# Patient Record
Sex: MALE | Race: WHITE | HISPANIC OR LATINO | URBAN - METROPOLITAN AREA
[De-identification: names, ages, dates, MRNs, and addresses within clinical notes are randomized per-mention and may not be internally consistent; named-entity substitution may affect disease eponyms.]

---

## 2017-05-17 ENCOUNTER — EMERGENCY (EMERGENCY)
Facility: HOSPITAL | Age: 52
LOS: 1 days | End: 2017-05-17

## 2021-09-19 ENCOUNTER — EMERGENCY (EMERGENCY)
Facility: HOSPITAL | Age: 56
LOS: 1 days | End: 2021-09-19
Admitting: EMERGENCY MEDICINE
Payer: COMMERCIAL

## 2021-09-19 PROCEDURE — 12001 RPR S/N/AX/GEN/TRNK 2.5CM/<: CPT

## 2021-09-19 PROCEDURE — 73140 X-RAY EXAM OF FINGER(S): CPT | Mod: 26,F3,LT

## 2021-09-19 PROCEDURE — 99283 EMERGENCY DEPT VISIT LOW MDM: CPT | Mod: 25

## 2022-12-06 ENCOUNTER — APPOINTMENT (OUTPATIENT)
Dept: PULMONOLOGY | Facility: CLINIC | Age: 57
End: 2022-12-06

## 2022-12-06 VITALS
SYSTOLIC BLOOD PRESSURE: 114 MMHG | HEIGHT: 68 IN | TEMPERATURE: 97.2 F | OXYGEN SATURATION: 99 % | HEART RATE: 90 BPM | BODY MASS INDEX: 23.04 KG/M2 | DIASTOLIC BLOOD PRESSURE: 71 MMHG | WEIGHT: 152 LBS

## 2022-12-06 DIAGNOSIS — R91.8 OTHER NONSPECIFIC ABNORMAL FINDING OF LUNG FIELD: ICD-10-CM

## 2022-12-06 PROBLEM — Z00.00 ENCOUNTER FOR PREVENTIVE HEALTH EXAMINATION: Status: ACTIVE | Noted: 2022-12-06

## 2022-12-06 PROCEDURE — 99204 OFFICE O/P NEW MOD 45 MIN: CPT

## 2022-12-06 NOTE — HISTORY OF PRESENT ILLNESS
[TextBox_4] : Very nice 58 yo man with persistent cough postdating Covid infection this summer\par Developed hemoptysis last week\par Notes persistent cough and now weight loss of at least 12 pounds\par CXR in September read as normal but Right hilum appeared full\par Sent for CT at [facility] last week:\par Large Right hilar mass with multiple nodules in right lung c/w metastases\par CT Calcium score in   showed NORMAL lung\par \par Nonsmoker\par No previous pulmonary disease\par Vaxxed and boosted--Covid in July for first time\par Handiman, , possible asbestos exposure in past\par \par Very strong FH for cancer, both parents  from cancer , mother ? lung\par Father ? primary\par Older sister  several years ago from cancer\par \par Hx richar, various broken bones No chronic meds. no med allergies\par

## 2022-12-06 NOTE — ASSESSMENT
[FreeTextEntry1] : Very nice 58 yo man with persistent cough postdating Covid infection this summer\par Developed hemoptysis last week\par Notes persistent cough and now weight loss of at least 12 pounds\par CXR in September read as normal but Right hilum appeared full\par Sent for CT at [facility] last week:\par Large Right hilar mass with multiple nodules in right lung c/w metastases\par CT Calcium score in   showed NORMAL lung\par \par Nonsmoker\par No previous pulmonary disease\par Vaxxed and boosted--Covid in July for first time\par Handiman, , possible asbestos exposure in past\par \par Very strong FH for cancer, both parents  from cancer , mother ? lung\par Father ? primary\par Older sister  several years ago from cancer\par \par Hx richar, various broken bones No chronic meds. no med allergies\par \par Imp\par 58 yo gentleman comes with wife and daughter (who is radiology tech)\par Post Covid in July developed cough\par Now has large right hilar mass with probable mets\par +++FH for cancer in parents and sister\par Patient says that he has an appt at [facility] in [facility] tomorrow with oncologist and surgeon\par They should proceed with this for diagnosis and treatment ASAP\par Will be glad to assist with post treatment care as required

## 2022-12-06 NOTE — CONSULT LETTER
[Dear  ___] : Dear  [unfilled], [FreeTextEntry1] : I had the pleasure of evaluating your patient, REJI APONTE , in the office today.  Please review my consultation and evaluation report that follows below.  Please do not hesitate to call me if further information is necessary or if you wish to discuss ongoing care or diagnostic work-up.   \par I very much appreciate your referral and it is a privilege to be able to provide care for your patient.\par \par Sincerely,\par  \par Ildefonso Butler MD, MHCM, FACP, ERIN-C\par Pulmonary Medicine\par  of Medicine\par [facility] at [facility]\par [email or web address] \par [facility] in [place]\par [address]\[place]  [address]\par    Fax \par \par [facility] [place]\par [address]\par [place], [place] \par \par

## 2024-10-22 ENCOUNTER — RESULT REVIEW (OUTPATIENT)
Age: 59
End: 2024-10-22

## 2024-10-22 ENCOUNTER — INPATIENT (INPATIENT)
Facility: HOSPITAL | Age: 59
LOS: 7 days | Discharge: NURSING, REHAB OR LONG-TERM CARE | DRG: 20 | End: 2024-10-30
Attending: NEUROLOGICAL SURGERY | Admitting: STUDENT IN AN ORGANIZED HEALTH CARE EDUCATION/TRAINING PROGRAM
Payer: COMMERCIAL

## 2024-10-22 ENCOUNTER — TRANSCRIPTION ENCOUNTER (OUTPATIENT)
Age: 59
End: 2024-10-22

## 2024-10-22 VITALS
TEMPERATURE: 98 F | SYSTOLIC BLOOD PRESSURE: 115 MMHG | RESPIRATION RATE: 14 BRPM | OXYGEN SATURATION: 99 % | DIASTOLIC BLOOD PRESSURE: 73 MMHG | HEART RATE: 70 BPM

## 2024-10-22 DIAGNOSIS — I61.9 NONTRAUMATIC INTRACEREBRAL HEMORRHAGE, UNSPECIFIED: ICD-10-CM

## 2024-10-22 LAB
ALBUMIN SERPL ELPH-MCNC: 3.9 G/DL — SIGNIFICANT CHANGE UP (ref 3.3–5.2)
ALP SERPL-CCNC: 120 U/L — SIGNIFICANT CHANGE UP (ref 40–120)
ALT FLD-CCNC: 9 U/L — SIGNIFICANT CHANGE UP
ANION GAP SERPL CALC-SCNC: 13 MMOL/L — SIGNIFICANT CHANGE UP (ref 5–17)
APTT BLD: 29.1 SEC — SIGNIFICANT CHANGE UP (ref 24.5–35.6)
AST SERPL-CCNC: 18 U/L — SIGNIFICANT CHANGE UP
BASOPHILS # BLD AUTO: 0.04 K/UL — SIGNIFICANT CHANGE UP (ref 0–0.2)
BASOPHILS NFR BLD AUTO: 0.3 % — SIGNIFICANT CHANGE UP (ref 0–2)
BILIRUB SERPL-MCNC: 0.7 MG/DL — SIGNIFICANT CHANGE UP (ref 0.4–2)
BLD GP AB SCN SERPL QL: SIGNIFICANT CHANGE UP
BUN SERPL-MCNC: 9.5 MG/DL — SIGNIFICANT CHANGE UP (ref 8–20)
CALCIUM SERPL-MCNC: 9 MG/DL — SIGNIFICANT CHANGE UP (ref 8.4–10.5)
CHLORIDE SERPL-SCNC: 102 MMOL/L — SIGNIFICANT CHANGE UP (ref 96–108)
CO2 SERPL-SCNC: 24 MMOL/L — SIGNIFICANT CHANGE UP (ref 22–29)
CREAT SERPL-MCNC: 0.81 MG/DL — SIGNIFICANT CHANGE UP (ref 0.5–1.3)
EGFR: 101 ML/MIN/1.73M2 — SIGNIFICANT CHANGE UP
EOSINOPHIL # BLD AUTO: 0.04 K/UL — SIGNIFICANT CHANGE UP (ref 0–0.5)
EOSINOPHIL NFR BLD AUTO: 0.3 % — SIGNIFICANT CHANGE UP (ref 0–6)
GLUCOSE BLDC GLUCOMTR-MCNC: 102 MG/DL — HIGH (ref 70–99)
GLUCOSE BLDC GLUCOMTR-MCNC: 82 MG/DL — SIGNIFICANT CHANGE UP (ref 70–99)
GLUCOSE BLDC GLUCOMTR-MCNC: 93 MG/DL — SIGNIFICANT CHANGE UP (ref 70–99)
GLUCOSE SERPL-MCNC: 108 MG/DL — HIGH (ref 70–99)
HCT VFR BLD CALC: 40.3 % — SIGNIFICANT CHANGE UP (ref 39–50)
HGB BLD-MCNC: 13.5 G/DL — SIGNIFICANT CHANGE UP (ref 13–17)
IMM GRANULOCYTES NFR BLD AUTO: 0.5 % — SIGNIFICANT CHANGE UP (ref 0–0.9)
INR BLD: 1.13 RATIO — SIGNIFICANT CHANGE UP (ref 0.85–1.16)
LYMPHOCYTES # BLD AUTO: 0.85 K/UL — LOW (ref 1–3.3)
LYMPHOCYTES # BLD AUTO: 6.3 % — LOW (ref 13–44)
MCHC RBC-ENTMCNC: 28.8 PG — SIGNIFICANT CHANGE UP (ref 27–34)
MCHC RBC-ENTMCNC: 33.5 GM/DL — SIGNIFICANT CHANGE UP (ref 32–36)
MCV RBC AUTO: 85.9 FL — SIGNIFICANT CHANGE UP (ref 80–100)
MONOCYTES # BLD AUTO: 0.9 K/UL — SIGNIFICANT CHANGE UP (ref 0–0.9)
MONOCYTES NFR BLD AUTO: 6.6 % — SIGNIFICANT CHANGE UP (ref 2–14)
MRSA PCR RESULT.: SIGNIFICANT CHANGE UP
NEUTROPHILS # BLD AUTO: 11.66 K/UL — HIGH (ref 1.8–7.4)
NEUTROPHILS NFR BLD AUTO: 86 % — HIGH (ref 43–77)
PLATELET # BLD AUTO: 319 K/UL — SIGNIFICANT CHANGE UP (ref 150–400)
POTASSIUM SERPL-MCNC: 3.8 MMOL/L — SIGNIFICANT CHANGE UP (ref 3.5–5.3)
POTASSIUM SERPL-SCNC: 3.8 MMOL/L — SIGNIFICANT CHANGE UP (ref 3.5–5.3)
PROT SERPL-MCNC: 7.5 G/DL — SIGNIFICANT CHANGE UP (ref 6.6–8.7)
PROTHROM AB SERPL-ACNC: 12.8 SEC — SIGNIFICANT CHANGE UP (ref 9.9–13.4)
RBC # BLD: 4.69 M/UL — SIGNIFICANT CHANGE UP (ref 4.2–5.8)
RBC # FLD: 14.7 % — HIGH (ref 10.3–14.5)
S AUREUS DNA NOSE QL NAA+PROBE: SIGNIFICANT CHANGE UP
SODIUM SERPL-SCNC: 139 MMOL/L — SIGNIFICANT CHANGE UP (ref 135–145)
WBC # BLD: 13.56 K/UL — HIGH (ref 3.8–10.5)
WBC # FLD AUTO: 13.56 K/UL — HIGH (ref 3.8–10.5)

## 2024-10-22 PROCEDURE — 93970 EXTREMITY STUDY: CPT | Mod: 26

## 2024-10-22 PROCEDURE — 88360 TUMOR IMMUNOHISTOCHEM/MANUAL: CPT | Mod: 26,1L,59

## 2024-10-22 PROCEDURE — 95720 EEG PHY/QHP EA INCR W/VEEG: CPT

## 2024-10-22 PROCEDURE — 93306 TTE W/DOPPLER COMPLETE: CPT | Mod: 26

## 2024-10-22 PROCEDURE — 71260 CT THORAX DX C+: CPT | Mod: 26

## 2024-10-22 PROCEDURE — 93010 ELECTROCARDIOGRAM REPORT: CPT

## 2024-10-22 PROCEDURE — 99285 EMERGENCY DEPT VISIT HI MDM: CPT

## 2024-10-22 PROCEDURE — 99223 1ST HOSP IP/OBS HIGH 75: CPT

## 2024-10-22 PROCEDURE — 88307 TISSUE EXAM BY PATHOLOGIST: CPT | Mod: 26

## 2024-10-22 PROCEDURE — 88342 IMHCHEM/IMCYTCHM 1ST ANTB: CPT | Mod: 26

## 2024-10-22 PROCEDURE — 70553 MRI BRAIN STEM W/O & W/DYE: CPT | Mod: 26

## 2024-10-22 PROCEDURE — 74177 CT ABD & PELVIS W/CONTRAST: CPT | Mod: 26

## 2024-10-22 PROCEDURE — 88341 IMHCHEM/IMCYTCHM EA ADD ANTB: CPT | Mod: 26

## 2024-10-22 PROCEDURE — 70545 MR ANGIOGRAPHY HEAD W/DYE: CPT | Mod: 26,59

## 2024-10-22 PROCEDURE — 99291 CRITICAL CARE FIRST HOUR: CPT

## 2024-10-22 PROCEDURE — 70450 CT HEAD/BRAIN W/O DYE: CPT | Mod: 26

## 2024-10-22 RX ORDER — ACETAMINOPHEN 500 MG
1000 TABLET ORAL ONCE
Refills: 0 | Status: COMPLETED | OUTPATIENT
Start: 2024-10-22 | End: 2024-10-22

## 2024-10-22 RX ORDER — NICARDIPINE HCL 30 MG
5 CAPSULE, EXTENDED RELEASE ORAL
Qty: 40 | Refills: 0 | Status: DISCONTINUED | OUTPATIENT
Start: 2024-10-22 | End: 2024-10-22

## 2024-10-22 RX ORDER — SODIUM CHLORIDE 9 MG/ML
1000 INJECTION, SOLUTION INTRAMUSCULAR; INTRAVENOUS; SUBCUTANEOUS
Refills: 0 | Status: DISCONTINUED | OUTPATIENT
Start: 2024-10-22 | End: 2024-10-22

## 2024-10-22 RX ORDER — POTASSIUM PHOSPHATE 236; 224 MG/ML; MG/ML
15 INJECTION, SOLUTION INTRAVENOUS ONCE
Refills: 0 | Status: COMPLETED | OUTPATIENT
Start: 2024-10-22 | End: 2024-10-22

## 2024-10-22 RX ORDER — LEVETIRACETAM 500 MG/1
500 TABLET, FILM COATED ORAL EVERY 12 HOURS
Refills: 0 | Status: DISCONTINUED | OUTPATIENT
Start: 2024-10-22 | End: 2024-10-22

## 2024-10-22 RX ORDER — ROSUVASTATIN CALCIUM 10 MG
1 TABLET ORAL
Refills: 0 | DISCHARGE

## 2024-10-22 RX ORDER — CHLORHEXIDINE GLUCONATE 40 MG/ML
1 SOLUTION TOPICAL
Refills: 0 | Status: DISCONTINUED | OUTPATIENT
Start: 2024-10-22 | End: 2024-10-30

## 2024-10-22 RX ORDER — LEVETIRACETAM 500 MG/1
750 TABLET, FILM COATED ORAL EVERY 12 HOURS
Refills: 0 | Status: DISCONTINUED | OUTPATIENT
Start: 2024-10-22 | End: 2024-10-23

## 2024-10-22 RX ORDER — INSULIN LISPRO 100/ML
VIAL (ML) SUBCUTANEOUS EVERY 6 HOURS
Refills: 0 | Status: DISCONTINUED | OUTPATIENT
Start: 2024-10-22 | End: 2024-10-23

## 2024-10-22 RX ORDER — HYDRALAZINE HYDROCHLORIDE 50 MG/1
10 TABLET, FILM COATED ORAL
Refills: 0 | Status: DISCONTINUED | OUTPATIENT
Start: 2024-10-22 | End: 2024-10-25

## 2024-10-22 RX ORDER — LEVETIRACETAM 500 MG/1
1000 TABLET, FILM COATED ORAL ONCE
Refills: 0 | Status: DISCONTINUED | OUTPATIENT
Start: 2024-10-22 | End: 2024-10-22

## 2024-10-22 RX ORDER — LABETALOL HCL 200 MG
10 TABLET ORAL
Refills: 0 | Status: DISCONTINUED | OUTPATIENT
Start: 2024-10-22 | End: 2024-10-25

## 2024-10-22 RX ADMIN — Medication 25 MG/HR: at 10:32

## 2024-10-22 RX ADMIN — SODIUM CHLORIDE 75 MILLILITER(S): 9 INJECTION, SOLUTION INTRAMUSCULAR; INTRAVENOUS; SUBCUTANEOUS at 12:03

## 2024-10-22 RX ADMIN — SODIUM CHLORIDE 75 MILLILITER(S): 9 INJECTION, SOLUTION INTRAMUSCULAR; INTRAVENOUS; SUBCUTANEOUS at 10:33

## 2024-10-22 RX ADMIN — CHLORHEXIDINE GLUCONATE 1 APPLICATION(S): 40 SOLUTION TOPICAL at 12:03

## 2024-10-22 RX ADMIN — POTASSIUM PHOSPHATE 62.5 MILLIMOLE(S): 236; 224 INJECTION, SOLUTION INTRAVENOUS at 11:08

## 2024-10-22 RX ADMIN — LEVETIRACETAM 750 MILLIGRAM(S): 500 TABLET, FILM COATED ORAL at 17:41

## 2024-10-22 RX ADMIN — Medication 1000 MILLIGRAM(S): at 19:53

## 2024-10-22 RX ADMIN — LEVETIRACETAM 1000 MILLIGRAM(S): 500 TABLET, FILM COATED ORAL at 15:07

## 2024-10-22 RX ADMIN — Medication 400 MILLIGRAM(S): at 18:53

## 2024-10-22 NOTE — ED ADULT NURSE NOTE - OBJECTIVE STATEMENT
[place] [facility] [place]. Last night the pt was c/o headache, but was at baseline. This AM woke up and was unable to speak. Arrives to ED on Nicardipine gtt with a BP in the 120s. As per wife at the bedside pt has PMHx lung ca, not currently on chemo and does not require baseline oxygen. Pt is able to follow commands and shake his head yes/no to simple questions, but is unable to speak. Right sided facial droop present at this time with right sided upper extremity weakness.

## 2024-10-22 NOTE — ED PROVIDER NOTE - ATTENDING CONTRIBUTION TO CARE
Dr. Richard: I personally saw the patient with the resident and performed a substantive portion of the visit. I performed a face to face bedside interview with patient regarding history of present illness, review of symptoms and past medical history. I completed an independent physical exam and all aspects of medical decision making. I have discussed patient's plan of care with resident. I agree with note as stated above, having amended the EMR as needed to reflect my findings. This includes HISTORY OF PRESENT ILLNESS, HIV, PAST MEDICAL/SURGICAL/FAMILY/SOCIAL HISTORY, ALLERGIES AND HOME MEDICATIONS, ROS, PHYSICAL EXAM, MEDICAL DECISION MAKING and any PROGRESS NOTES during the time I functioned as the attending physician for this patient.

## 2024-10-22 NOTE — CONSULT NOTE ADULT - SUBJECTIVE AND OBJECTIVE BOX
This is a 59 year old gentleman with a pertinent history of lung cancer s/p right lobectomy (MSK 2022) who presented with aphasia and headache started suddenly at 1630 on 10/21 and followed by right facial droop and right-sided weakness this morning. Patient took ASA 81 mg yesterday at 1630 when aphasia started. NIHSS of 7 on admission. CTH demonstrated left posterior frontal lobar ICH. CTA h/n is unremarkable for intracranial vessel pathology.     Neurological Exam:   MS - Alert, awake, follows simple but not crossed or complex commands. Severe expressive aphasia. Able to write his name. No neglect.   CN - PERRL, EOMI, VFF, right facial droop   Motor - Mild drift RUE, confrontational testing intact though; the rest - full strength   Sensory - intact to LT

## 2024-10-22 NOTE — ED ADULT NURSE NOTE - NSFALLHARMRISKINTERV_ED_ALL_ED

## 2024-10-22 NOTE — ED ADULT NURSE NOTE - BEFAST SCREENING
Airway  Performed by: Alexander Aleman CRNA  Authorized by: Alexander Aleman CRNA     Final Airway Type:  Endotracheal airway  Final Endotracheal Airway*:  ETT  ETT Size (mm)*:  8.5  Cuff*:  Regular  Technique Used for Successful ETT Placement:  Direct laryngoscopy  Devices/Methods Used in Placement*:  Mask and Oral ETT  Intubation Procedure*:  Preoxygenation, ETCO2, Atraumatic, Dentition Unchanged, Pharynx Clear and Rapid Sequence Intubation  Insertion Site:  Oral  Blade Type*:  MAC  Blade Size*:  3  Cuff Volume (mL):  8  Secured at (cm)*:  23  Placement Verified by: auscultation, bronchoscopy, capnometry and equal breath sounds    Glottic View*:  1 - full view of glottis  Attempts*:  1  Number of Other Approaches Attempted:  0   Patient Identified, Procedure confirmed, Emergency equipment available and Safety protocols followed  Location:  OR  Urgency:  Elective  Difficult Airway: No    Indications for Airway Management:  Anesthesia  Spontaneous Ventilation: absent    Sedation Level:  Anesthetized  Manual In-line Stabilization Maintained Throughout: Yes    Mask Difficulty Assessment:  1 - vent by mask  Performed By:  CRNA  CRNA:  Alexander Aleman CRNA  Start Time: 3/21/2023 9:02 AM     Positive

## 2024-10-22 NOTE — H&P ADULT - ASSESSMENT
58 yo M with PMHx of lung cancer (2021 ) [place]/p lobectomy [place] [facility] [place] and aphasia followed by R facial and R sided weakness starting 4:30 PM. Found to have L posterior frontal cortical ICH, admitted to NeuroICU for further care    PLAN:  Neuro:  - Q1 hour Neuro checks, Q1 hour Vitals  - HOB 30 degrees, Neck midline position  - Maintain normothermia, PO acetaminophen for temp>38 C or pain  - repeat CTH in 4-6hrs   - CTA head/neck Negative.   - Pending MR brain w/ and w/o to rule out mets.   - Pending MRV w/ contrast to evlaute sinus filling.   - PT/OT   - AED: Keppra 500mg BID   	  CV:  - SBP Goal<150, wean cardene as amenable.   - CT chest abdomen and pelvis for mets workup  - PRN: hydralazine    Pulm:  - Supplemental O2 PRN to maintain Spo2>92%    GI:  - NPO  - start bowel regimen when able     	  :  - NS@75  - Monitor Electrolytes & Renal Function    Heme:  - Monitor H&H  - DVT ppx: SCDs  - pend b/l LE duplex   	  ID:  - Monitor WBC and Temperature      Endo  - Monitor BGL, maintain <180  - ISS

## 2024-10-22 NOTE — CHART NOTE - NSCHARTNOTEFT_GEN_A_CORE
EEG preliminary read (not final) on the initial recording hour(s) = x 1     No seizures recorded.  Normal awake EEG, PDR 9-9.5.   Final report to follow tomorrow morning after completion of study.    Marshall Joe DO  Epilepsy Fellow, PGY-5    -------------------------------------------------------------------------------------------------------  [facility] EEG Reading Room Ph#: [phone]  Epilepsy Answering Service after 5PM and before 8:30AM: Ph#: [phone]

## 2024-10-22 NOTE — PATIENT PROFILE ADULT - FALL HARM RISK - HARM RISK INTERVENTIONS
Assistance with ambulation/Assistance OOB with selected safe patient handling equipment/Communicate Risk of Fall with Harm to all staff/Monitor gait and stability/Reinforce activity limits and safety measures with patient and family/Sit up slowly, dangle for a short time, stand at bedside before walking/Tailored Fall Risk Interventions/Visual Cue: Yellow wristband and red socks/Bed in lowest position, wheels locked, appropriate side rails in place/Call bell, personal items and telephone in reach/Instruct patient to call for assistance before getting out of bed or chair/Non-slip footwear when patient is out of bed/[place] to call system/Physically safe environment - no spills, clutter or unnecessary equipment/Purposeful Proactive Rounding/Room/bathroom lighting operational, light cord in reach

## 2024-10-22 NOTE — CONSULT NOTE ADULT - SUBJECTIVE AND OBJECTIVE BOX
HPI:  60 yo M with PMHX of lung cancer(diagnosed ) s/p R lobectomy (MSK ) and HLD  p/w migrane and aphasia starting 4:30 pm 10/21/2024  followed by new onset R facial droop& R sided weakness starting this AM 10/22/2024. Wife states she gave one tablet of baby ASA around 4:30 pm yesterdat for migrane. Patient denies daily use of AC/AP. Denies trauma. NIHSS 7 upon admission. Patient found to have L posterior frontal cortical ICH, transfered to [facility]  for further care   NIH Stroke Scale:  - Intubated No  - Sedated/Unresponsive No  - NIH Stroke Scale: LOC (0) Alert; keenly responsive  - NIH Stroke Scale: LOC Question (0) Answers both questions correctly  - NIH Stroke Scale: LOC Command (0) Performs both tasks correctly  - NIH Stroke Scale: Gaze (0) Normal  - NIH Stroke Scale: Visual (0) No visual loss  - NIH Stroke Scale: Facial (3) Complete paralysis of one or both sides (absence  of facial movement in the upper and lower face)  - NIH Stroke Scale: Arm Left (0) No drift; limb holds 90 (or 45) degrees for  full 10 secs  - NIH Stroke Scale: Arm Right (1) Drift; limb holds 90 (or 45) degrees, but  drifts down before full 10 secs; does not hit bed or other support  - NIH Stroke Scale: Leg Left (0) No drift; leg holds 30 degree position for  full 5 secs  - NIH Stroke Scale: Leg Right (0) No drift; leg holds 30 degree position for  full 5 secs  - NIH Stroke Scale: Ataxia (0) Absent  - NIH Stroke Scale: Sensory (0) Normal; no sensory loss  - NIH Stroke Scale: Language (3) Mute, global aphasia; no usable speech or  auditory comprehension  - NIH Stroke Scale: Dysarthria (0) Normal  - NIH Stroke Scale: Extinct Inattention (0) No abnormality  - NIH Stroke Scale: Total 7    PMHx:   Lung cancer diagnosed in , s/p R Lobectomy- follows with MSK  HLD: admits noncompliant with medication    Allergies:   NKDA      Vital Signs Last 24 Hrs  T(C): 37.4 (22 Oct 2024 10:34), Max: 37.4 (22 Oct 2024 10:34)  T(F): 99.4 (22 Oct 2024 10:34), Max: 99.4 (22 Oct 2024 10:34)  HR: 95 (22 Oct 2024 10:49) (70 - 104)  BP: 114/42 (22 Oct 2024 10:49) (114/42 - 127/89)  BP(mean): 64 (22 Oct 2024 10:49) (64 - 101)  RR: 16 (22 Oct 2024 10:49) (14 - 20)  SpO2: 99% (22 Oct 2024 10:49) (97% - 99%)    Parameters below as of 22 Oct 2024 10:49  Patient On (Oxygen Delivery Method): room air              LABS:                        13.5   13.56 )-----------( 319      ( 22 Oct 2024 09:30 )             40.3     10-    139  |  102  |  9.5  ----------------------------<  108[H]  3.8   |  24.0  |  0.81    Ca    9.0      22 Oct 2024 09:30  Phos  2.4     10-  Mg     2.0     10-    TPro  7.5  /  Alb  3.9  /  TBili  0.7  /  DBili  x   /  AST  18  /  ALT  9   /  AlkPhos  120  10-22    PT/INR - ( 22 Oct 2024 09:30 )   PT: 12.8 sec;   INR: 1.13 ratio         PTT - ( 22 Oct 2024 09:30 )  PTT:29.1 sec  Urinalysis Basic - ( 22 Oct 2024 09:30 )    Color: x / Appearance: x / SG: x / pH: x  Gluc: 108 mg/dL / Ketone: x  / Bili: x / Urobili: x   Blood: x / Protein: x / Nitrite: x   Leuk Esterase: x / RBC: x / WBC x   Sq Epi: x / Non Sq Epi: x / Bacteria: x      Physical Exam:  General: No Acute Distress   Neurological: Awake, alert & oriented to person, place and time (with choices) Following Commands, Severe expressive aphasia. Understands and follows commands appropriately. PERRL, EOMI, Visual fields intact,, + R facial asymmetry.   Muscle Strength: RUE: 4+/5 + Slight R drift  H/5  LUE: 5/5 RLE: 4+/5 LLE: 5/5  No Drift   Sensation Intact to Light Touch   Cerebellar: There is no dysmetria on finger to nose testing.  Gait : deferred  Pulmonary: normal chest rise and expansion   Cardiovascular:  +S1, +S2  Gastrointestinal: Soft  Nondistended    RADIOLOGY & ADDITIONAL TESTS:  CT Head 10/22 @ [facility] @ 8:07   IMPRESSION:  1. Redemonstrated left frontal lobe parenchymal hemorrhage, grossly stable.  2. Faint subarachnoid hemorrhage overlying the left frontal lobe sulci and sylvian fissure.

## 2024-10-22 NOTE — ED ADULT TRIAGE NOTE - CHIEF COMPLAINT QUOTE
pt transferred from [facility] for eval of brain bleed. pt aphasic with facial drop. LKW 1800 10/21. pt on cardene gtt @ 5

## 2024-10-22 NOTE — H&P ADULT - NSHPPHYSICALEXAM_GEN_ALL_CORE
PHYSICAL EXAM:  General: No Acute Distress   Neurological: Awake, alert & oriented to person, place and time (with choices) Following Commands, Severe expressive aphasia. Understands and follows commands appropriately. PERRL, EOMI, Visual fields intact,, + R facial asymmetry.     Muscle Strength: RUE: 4+/5 + Slight R drift  H/5  LUE: 5/5 RLE: 4+/5 LLE: 5/5  No Drift     Sensation Intact to Light Touch     Cerebellar: There is no dysmetria on finger to nose testing.    Gait : deferred    Pulmonary: normal chest rise and expansion     Cardiovascular:  +S1, +S2    Gastrointestinal: Soft  Nondistended

## 2024-10-22 NOTE — ED PROVIDER NOTE - OBJECTIVE STATEMENT
58Yo M hx of lung CA sp R lobectomy and R arter Brandi Richard MD, Attending  58 yo M with PMHX of lung cancer(diagnosed 2021) s/p R lobectomy (MSK 2022) and HLD  p/w migrane at 4:30 pm 10/21/2024  followed by new onset R facial droop& R sided weakness and aphasia this AM 10/22/2024. Wife states she gave one tablet of baby ASA around 4:30 pm yesterday for migrane, no AC. Denies trauma. Patient found to have L posterior frontal cortical ICH from [place] and transferred to [facility] for nsgy eval.     No change in neuro status since  per EMS, Nicardipine infusion started just prior to EMS  at [place],  on 5mg/hr at ED arrival. NIHSS 7 at ED eval.

## 2024-10-22 NOTE — DISCHARGE NOTE NURSING/CASE MANAGEMENT/SOCIAL WORK - PATIENT PORTAL LINK FT
You can access the FollowMyHealth Patient Portal offered by [facility] by registering at the following website: [email or web address]. By joining [employer]’s FollowMyHealth portal, you will also be able to view your health information using other applications (apps) compatible with our system.

## 2024-10-22 NOTE — ED PROVIDER NOTE - PHYSICAL EXAMINATION
Brandi Richard MD Attending  GEN: Patient awake, aphasic but follows commands and is alert.   HEENT: normocephalic, atraumatic, EOMI, no scleral icterus  CARDIAC: sbp 127 on cardene infusion @5mg/hr  PULM: no distress, no wheeze.   ABD: soft NT, ND, no rebound no guarding  MSK: Moving all extremities, no edema. 4/5 strength RUE  NEURO: + aphasia, right facial weakness.   SKIN: warm, dry, no rash.

## 2024-10-22 NOTE — DISCHARGE NOTE NURSING/CASE MANAGEMENT/SOCIAL WORK - NSDCPEFALRISK_GEN_ALL_CORE
For information on Fall & Injury Prevention, visit: [email or web address] [email or web address] OR  [email or web address] OR  https://www.cdc.gov/steadi/patient.html

## 2024-10-22 NOTE — DISCHARGE NOTE NURSING/CASE MANAGEMENT/SOCIAL WORK - FINANCIAL ASSISTANCE
[facility] provides services at a reduced cost to those who are determined to be eligible through [facility]’s financial assistance program. Information regarding [facility]’s financial assistance program can be found by going to [email or web address] or by calling [phone].

## 2024-10-22 NOTE — H&P ADULT - NSHPSOCIALHISTORY_GEN_ALL_CORE
patient lives with wife in ranch home. Denies hx of smoking, or illiciit drug use. Notes occasional recreational etoh use.

## 2024-10-22 NOTE — ED PROVIDER NOTE - CLINICAL SUMMARY MEDICAL DECISION MAKING FREE TEXT BOX
Brandi Richard MD, Attending  ddx includes, but is not limited to the following:  plan:  update: see progress notes.   ----

## 2024-10-22 NOTE — H&P ADULT - HISTORY OF PRESENT ILLNESS
58 yo M with PMHX of lung cancer(diagnosed 2021) s/p R lobectomy (MSK 2022) and HLD  p/w migrane and aphasia starting 4:30 pm 10/21/2024  followed by new onset R facial droop  and R sided weakness starting this morning 10/22/2024. Wife states she gave one tablet of baby ASA around 4:30 pm yesterdat for migrane. Patient denies daily use of AC/AP. Denies trauma. NIHSS 7 upon admission. Patient found to have L posterior frontal cortical ICH, transfered to [facility] for further care. Patient on cardene in ED, transfered to NeuroICU for monitoring.     NIH Stroke Scale:  - Intubated No  - Sedated/Unresponsive No  - NIH Stroke Scale: LOC (0) Alert; keenly responsive  - NIH Stroke Scale: LOC Question (0) Answers both questions correctly  - NIH Stroke Scale: LOC Command (0) Performs both tasks correctly  - NIH Stroke Scale: Gaze (0) Normal  - NIH Stroke Scale: Visual (0) No visual loss  - NIH Stroke Scale: Facial (3) Complete paralysis of one or both sides (absence  of facial movement in the upper and lower face)  - NIH Stroke Scale: Arm Left (0) No drift; limb holds 90 (or 45) degrees for  full 10 secs  - NIH Stroke Scale: Arm Right (1) Drift; limb holds 90 (or 45) degrees, but  drifts down before full 10 secs; does not hit bed or other support  - NIH Stroke Scale: Leg Left (0) No drift; leg holds 30 degree position for  full 5 secs  - NIH Stroke Scale: Leg Right (0) No drift; leg holds 30 degree position for  full 5 secs  - NIH Stroke Scale: Ataxia (0) Absent  - NIH Stroke Scale: Sensory (0) Normal; no sensory loss  - NIH Stroke Scale: Language (3) Mute, global aphasia; no usable speech or  auditory comprehension  - NIH Stroke Scale: Dysarthria (0) Normal  - NIH Stroke Scale: Extinct Inattention (0) No abnormality  - NIH Stroke Scale: Total 7      PMHx:   Lung cancer diagnosed in 2021, s/p R Lobectomy- follows with MSK  HLD: admits noncompliant with medication    Allergies:   NKDA

## 2024-10-22 NOTE — PHARMACOTHERAPY INTERVENTION NOTE - COMMENTS
Medication reconciliation completed by speaking to patient's wife and daughter at bedside.  Patient currently does not take any medications.   Per wife, patient was given aspirin 325mg for headache yesterday around 4:30pm.     No known medication allergies.  Medication reconciliation completed by speaking to patient's wife and daughter at bedside.  Patient currently does not take any medications.   Per daughter, is supposed to be on rosuvastatin 10mg QD but refuses to take medication.     Of note per wife, patient was given aspirin 325mg for headache yesterday around 4:30pm.     No known medication allergies.

## 2024-10-22 NOTE — CONSULT NOTE ADULT - SUBJECTIVE AND OBJECTIVE BOX
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 LKW yesterday 10/21 at 4:30 when he began having a headache.  Wife gave him aspirin and she noted around 6:30PM he wasn't speaking.  This AM noted he had a facial droop so was sent to the hospital.  He was found to have a L frontal cortical IPH at [facility] [place] and then transferred here for further mgmt.          PAST MEDICAL & SURGICAL HISTORY:  Lung cancer diagnosed in 2021, s/p R Lobectomy- follows with [facility]--also with RT        MEDICATIONS  (STANDING):  acetaminophen   IVPB .. 1000 milliGRAM(s) IV Intermittent once  chlorhexidine 2% Cloths 1 Application(s) Topical <User Schedule>  dextrose 50% Injectable 25 Gram(s) IV Push once  dextrose 50% Injectable 12.5 Gram(s) IV Push once  dextrose 50% Injectable 25 Gram(s) IV Push once  insulin lispro (ADMELOG) corrective regimen sliding scale   SubCutaneous every 6 hours  levETIRAcetam   Injectable 500 milliGRAM(s) IV Push every 12 hours  niCARdipine Infusion 5 mG/Hr (25 mL/Hr) IV Continuous <Continuous>  sodium chloride 0.9%. 1000 milliLiter(s) (75 mL/Hr) IV Continuous <Continuous>    MEDICATIONS  (PRN):      Allergies    No Known Allergies    Intolerances        SOCIAL HISTORY:  no tob,   no alcohol   no drugs    FAMILY HISTORY:        ROS: 14 point ROS negative other than what is present in HPI or below    Vital Signs Last 24 Hrs  T(C): 37.4 (22 Oct 2024 11:25), Max: 37.4 (22 Oct 2024 10:34)  T(F): 99.4 (22 Oct 2024 11:25), Max: 99.4 (22 Oct 2024 10:34)  HR: 87 (22 Oct 2024 13:00) (70 - 104)  BP: 120/103 (22 Oct 2024 13:00) (114/42 - 138/103)  BP(mean): 106 (22 Oct 2024 13:00) (64 - 115)  RR: 17 (22 Oct 2024 13:00) (14 - 20)  SpO2: 98% (22 Oct 2024 13:00) (97% - 99%)    Parameters below as of 22 Oct 2024 12:00  Patient On (Oxygen Delivery Method): room air          Physical Exam:  General: No acute distress.   HEENT: Head normocephalic, atraumatic. Conjunctivae clear w/o exudates or hemorrhage. Sclera non-icteric. Nares are patent bilaterally. Mucous membranes pink and moist.  No tonsillar swelling or exudates.    Neck: Supple, no adenopathy. Trachea midline. No JVD.  Cardiac: Normal rate and rhythm. S1, S2 auscultated. No murmurs, gallops, or rubs.     Respiratory: Lung sounds clear in all fields. Chest wall symmetric, nontender.   Abdominal: Soft, nondistended, nontender. Bowel sounds normoactive x 4 quadrants. No masses, hepatomegaly, or splenomegaly.   Skin: Skin is warm, dry and intact without rashes or lesions. Appropriate color for ethnicity. Nailbeds pink with no cyanosis or clubbing.   Extremities: No edema, 2+ peripheral pulses in b/l upper and b/l lower extremities. Full range of motion in all joints.     Detailed Neurologic Exam:    Mental status: The patient is awake and alert and has normal attention span.  Nonverbal, comprehension intact    Cranial nerves: Pupils equal and react symmetrically to light. There is no visual field deficit to confrontation. Extraocular motion is full with no nystagmus. R facial droop with mild ptosis, forehead spared, Facial musculature is symmetric. Palate elevates symmetrically. Tongue is midline.    Motor: There is normal bulk and tone.  There is no tremor.  Strength is 5-/5 in RUE with mild distal finger flexion and orbiting of the RUE, RLE 5/5  Strength is 5/5 in the left arm and leg.    Sensation: Intact to light touch and pin in 4 extremities    Reflexes: 1-2+ throughout and plantar responses are flexor.    Cerebellar: There is no dysmetria on finger to nose testing.    Gait : deferred    NIH SS:  DATE:  TIME:  1A: Level of consciousness (0-3):   1B: Questions (0-2):   1C: Commands (0-2):   2: Gaze (0-2):   3: Visual fields (0-3):   4: Facial palsy (0-3):   MOTOR:  5A: Left arm motor drift (0-4):   5B: Right arm motor drift (0-4):   6A: Left leg motor drift (0-4):   6B: Right leg motor drift (0-4):   7: Limb ataxia (0-2):   SENSORY:  8: Sensation (0-2):   SPEECH:  9: Language (0-3): 2  10: Dysarthria (0-2):   EXTINCTION:  11: Extinction/inattention (0-2):     TOTAL SCORE: 2    prehospital mRS=0      LABS:                         13.5   13.56 )-----------( 319      ( 22 Oct 2024 09:30 )             40.3       10-22    139  |  102  |  9.5  ----------------------------<  108[H]  3.8   |  24.0  |  0.81    Ca    9.0      22 Oct 2024 09:30  Phos  2.4     10-22  Mg     2.0     10-22    TPro  7.5  /  Alb  3.9  /  TBili  0.7  /  DBili  x   /  AST  18  /  ALT  9   /  AlkPhos  120  10-22      PT/INR - ( 22 Oct 2024 09:30 )   PT: 12.8 sec;   INR: 1.13 ratio         PTT - ( 22 Oct 2024 09:30 )  PTT:29.1 sec        A1C:         RADIOLOGY & ADDITIONAL STUDIES (independently reviewed unless otherwise noted):  CT head: 1. Redemonstrated left frontal lobe parenchymal hemorrhage, grossly stable.  2. Faint subarachnoid hemorrhage overlying the left frontal lobe sulci and sylvian fissure.    CTA head and neck: Normal CTA of the head and neck. No evidence of left middle cerebral artery aneurysm. The left frontal parenchymal hemorrhage with vasogenic edema may represent a brain metastasis in view of the history. Recommend MRI for further evaluation.    Evidence of prior right-sided pulmonary lobectomy with midline shift to the right.

## 2024-10-22 NOTE — ED ADULT NURSE NOTE - CHIEF COMPLAINT QUOTE
[place] [facility] [place]. pt aphasic with facial drop. LKW 1800 10/21. pt on cardene gtt @ 5

## 2024-10-23 ENCOUNTER — RESULT REVIEW (OUTPATIENT)
Age: 59
End: 2024-10-23

## 2024-10-23 DIAGNOSIS — I50.20 UNSPECIFIED SYSTOLIC (CONGESTIVE) HEART FAILURE: ICD-10-CM

## 2024-10-23 DIAGNOSIS — I61.9 NONTRAUMATIC INTRACEREBRAL HEMORRHAGE, UNSPECIFIED: ICD-10-CM

## 2024-10-23 DIAGNOSIS — Z01.810 ENCOUNTER FOR PREPROCEDURAL CARDIOVASCULAR EXAMINATION: ICD-10-CM

## 2024-10-23 LAB
A1C WITH ESTIMATED AVERAGE GLUCOSE RESULT: 6 % — HIGH (ref 4–5.6)
ANION GAP SERPL CALC-SCNC: 16 MMOL/L — SIGNIFICANT CHANGE UP (ref 5–17)
APTT BLD: 30.1 SEC — SIGNIFICANT CHANGE UP (ref 24.5–35.6)
BUN SERPL-MCNC: 11.2 MG/DL — SIGNIFICANT CHANGE UP (ref 8–20)
CALCIUM SERPL-MCNC: 9 MG/DL — SIGNIFICANT CHANGE UP (ref 8.4–10.5)
CHLORIDE SERPL-SCNC: 101 MMOL/L — SIGNIFICANT CHANGE UP (ref 96–108)
CHOLEST SERPL-MCNC: 258 MG/DL — HIGH
CO2 SERPL-SCNC: 22 MMOL/L — SIGNIFICANT CHANGE UP (ref 22–29)
CREAT SERPL-MCNC: 0.83 MG/DL — SIGNIFICANT CHANGE UP (ref 0.5–1.3)
EGFR: 100 ML/MIN/1.73M2 — SIGNIFICANT CHANGE UP
ESTIMATED AVERAGE GLUCOSE: 126 MG/DL — HIGH (ref 68–114)
GLUCOSE BLDC GLUCOMTR-MCNC: 113 MG/DL — HIGH (ref 70–99)
GLUCOSE BLDC GLUCOMTR-MCNC: 77 MG/DL — SIGNIFICANT CHANGE UP (ref 70–99)
GLUCOSE BLDC GLUCOMTR-MCNC: 96 MG/DL — SIGNIFICANT CHANGE UP (ref 70–99)
GLUCOSE SERPL-MCNC: 77 MG/DL — SIGNIFICANT CHANGE UP (ref 70–99)
HCT VFR BLD CALC: 38.8 % — LOW (ref 39–50)
HDLC SERPL-MCNC: 48 MG/DL — SIGNIFICANT CHANGE UP
HGB BLD-MCNC: 13 G/DL — SIGNIFICANT CHANGE UP (ref 13–17)
INR BLD: 1.1 RATIO — SIGNIFICANT CHANGE UP (ref 0.85–1.16)
LIPID PNL WITH DIRECT LDL SERPL: 190 MG/DL — HIGH
MAGNESIUM SERPL-MCNC: 1.8 MG/DL — SIGNIFICANT CHANGE UP (ref 1.6–2.6)
MCHC RBC-ENTMCNC: 29 PG — SIGNIFICANT CHANGE UP (ref 27–34)
MCHC RBC-ENTMCNC: 33.5 GM/DL — SIGNIFICANT CHANGE UP (ref 32–36)
MCV RBC AUTO: 86.6 FL — SIGNIFICANT CHANGE UP (ref 80–100)
NON HDL CHOLESTEROL: 210 MG/DL — HIGH
PHOSPHATE SERPL-MCNC: 3.2 MG/DL — SIGNIFICANT CHANGE UP (ref 2.4–4.7)
PLATELET # BLD AUTO: 287 K/UL — SIGNIFICANT CHANGE UP (ref 150–400)
POTASSIUM SERPL-MCNC: 4.1 MMOL/L — SIGNIFICANT CHANGE UP (ref 3.5–5.3)
POTASSIUM SERPL-SCNC: 4.1 MMOL/L — SIGNIFICANT CHANGE UP (ref 3.5–5.3)
PROTHROM AB SERPL-ACNC: 12.8 SEC — SIGNIFICANT CHANGE UP (ref 9.9–13.4)
RBC # BLD: 4.48 M/UL — SIGNIFICANT CHANGE UP (ref 4.2–5.8)
RBC # FLD: 14.9 % — HIGH (ref 10.3–14.5)
SODIUM SERPL-SCNC: 139 MMOL/L — SIGNIFICANT CHANGE UP (ref 135–145)
TRIGL SERPL-MCNC: 101 MG/DL — SIGNIFICANT CHANGE UP
TSH SERPL-MCNC: 2.98 UIU/ML — SIGNIFICANT CHANGE UP (ref 0.27–4.2)
WBC # BLD: 13.22 K/UL — HIGH (ref 3.8–10.5)
WBC # FLD AUTO: 13.22 K/UL — HIGH (ref 3.8–10.5)

## 2024-10-23 PROCEDURE — 99291 CRITICAL CARE FIRST HOUR: CPT

## 2024-10-23 PROCEDURE — 99254 IP/OBS CNSLTJ NEW/EST MOD 60: CPT

## 2024-10-23 PROCEDURE — 70450 CT HEAD/BRAIN W/O DYE: CPT | Mod: 26,77

## 2024-10-23 PROCEDURE — 95720 EEG PHY/QHP EA INCR W/VEEG: CPT

## 2024-10-23 PROCEDURE — 93308 TTE F-UP OR LMTD: CPT | Mod: 26

## 2024-10-23 PROCEDURE — 70450 CT HEAD/BRAIN W/O DYE: CPT | Mod: 26

## 2024-10-23 RX ORDER — LEVETIRACETAM 500 MG/1
1500 TABLET, FILM COATED ORAL EVERY 12 HOURS
Refills: 0 | Status: DISCONTINUED | OUTPATIENT
Start: 2024-10-23 | End: 2024-10-30

## 2024-10-23 RX ORDER — LEVETIRACETAM 500 MG/1
2000 TABLET, FILM COATED ORAL ONCE
Refills: 0 | Status: DISCONTINUED | OUTPATIENT
Start: 2024-10-23 | End: 2024-10-23

## 2024-10-23 RX ORDER — INSULIN LISPRO 100/ML
VIAL (ML) SUBCUTANEOUS
Refills: 0 | Status: DISCONTINUED | OUTPATIENT
Start: 2024-10-23 | End: 2024-10-30

## 2024-10-23 RX ORDER — MAGNESIUM SULFATE IN 0.9% NACL 2 G/50 ML
2 INTRAVENOUS SOLUTION, PIGGYBACK (ML) INTRAVENOUS ONCE
Refills: 0 | Status: COMPLETED | OUTPATIENT
Start: 2024-10-23 | End: 2024-10-23

## 2024-10-23 RX ORDER — SODIUM CHLORIDE 9 MG/ML
1000 INJECTION, SOLUTION INTRAMUSCULAR; INTRAVENOUS; SUBCUTANEOUS
Refills: 0 | Status: DISCONTINUED | OUTPATIENT
Start: 2024-10-24 | End: 2024-10-26

## 2024-10-23 RX ORDER — POLYETHYLENE GLYCOL 3350 17 G/17G
17 POWDER, FOR SOLUTION ORAL DAILY
Refills: 0 | Status: DISCONTINUED | OUTPATIENT
Start: 2024-10-23 | End: 2024-10-30

## 2024-10-23 RX ORDER — SENNA 187 MG
2 TABLET ORAL AT BEDTIME
Refills: 0 | Status: DISCONTINUED | OUTPATIENT
Start: 2024-10-23 | End: 2024-10-30

## 2024-10-23 RX ORDER — DEXAMETHASONE 1.5 MG 1.5 MG/1
4 TABLET ORAL EVERY 6 HOURS
Refills: 0 | Status: DISCONTINUED | OUTPATIENT
Start: 2024-10-23 | End: 2024-10-27

## 2024-10-23 RX ADMIN — DEXAMETHASONE 1.5 MG 4 MILLIGRAM(S): 1.5 TABLET ORAL at 17:24

## 2024-10-23 RX ADMIN — Medication 25 GRAM(S): at 06:14

## 2024-10-23 RX ADMIN — CHLORHEXIDINE GLUCONATE 1 APPLICATION(S): 40 SOLUTION TOPICAL at 06:13

## 2024-10-23 RX ADMIN — Medication 2 TABLET(S): at 21:15

## 2024-10-23 RX ADMIN — LEVETIRACETAM 2000 MILLIGRAM(S): 500 TABLET, FILM COATED ORAL at 12:46

## 2024-10-23 RX ADMIN — LEVETIRACETAM 750 MILLIGRAM(S): 500 TABLET, FILM COATED ORAL at 06:13

## 2024-10-23 NOTE — CONSULT NOTE ADULT - PROBLEM SELECTOR RECOMMENDATION 9
.  - Pt admitted to Neuro ICU for left ICH, EEG in progress at bedside  - Repeat CTH performed 10/23 reveals small stable parenchymal hemorrhage with adjacent subarachnoid hemorrhage, new area of high attenuation; to be addressed by Neuro primary team  - Previous TTE performed 3/20/2023 showed LVEF of 55%, No apical views, normal LV size and thickness, limited assessment of wall motion but no definite WMA, RV not assessed  - TTE performed 10/22/2024 shows severely reduced LV systolic function; LVEF <20%; however, suboptimal/limited study  - Likely stress induced cardiomyopathy in the setting of ICH; DANIEL nonurgent at this time, not assessing for cardioembolic events  - Pt planned for OR possibly Friday with Neuro for angio to assess bleed  - Plan to repeat TTE to assess LV function prior to OR, however, if still suboptimal may consider DANIEL   - Permissive hypertension goals as per Neuro team; when appropriate plan to start GDMT as tolerated by BP & HR and repeat echo in a few days

## 2024-10-23 NOTE — PROGRESS NOTE ADULT - ASSESSMENT
59M PMH Lung Cx now with new LF Hemorrhagic CNS neoplasm    Patient is critically ill with high probability of imminent neurologic or life-threatening deterioration due to the following diagnoses:  - CNS Neoplasm  - ICH  - Seizures  - Cerebral Edema    PLAN:  - Neuro-checks  - Continue EEG given intermittent seizures  - Keppra 750->1.5g BID  - SBP Goal<160  - Supplemental O2 PRN to maintain Spo2>92%  - Advance diet as tolerated  - DVT ppx: SCD, Start SQL  - Monitor BGL, maintain <180 with LSS    My full attention was spent providing medically necessary critical care to the patient with details documented in my note above.   Critical care time spent examining patient, reviewing vitals, labs, medications, imaging and discussing with the team goals of care   The combined critical care time provided to the patient was 60 minutes  This time does not include bedside procedures that are documented separately.   HTN (hypertension)

## 2024-10-23 NOTE — PHYSICAL THERAPY INITIAL EVALUATION ADULT - PERSONAL SAFETY AND JUDGMENT, REHAB EVAL
Called pt to move appointment, stated he never heard from the [facility] referral. Called Dr Mimi Wong office in [address] [place] [address]. They stated patient had an appointment on 11/12/2021 for a PFT @ 10:15 and appointment with the doctor @11:15. Called and gave patient the information and phone number to call if he needed to r/s. [phone]. Patient voiced understanding.
at risk behaviors demonstrated

## 2024-10-23 NOTE — CONSULT NOTE ADULT - NS ATTEND AMEND GEN_ALL_CORE FT
seen with above,    59M history significant for HLD, lung cancer s/p R-pneumectomy 2022 (at [facility] presents with acute headache and aphasia found with acute hemorrhagic CVA, transferred to NSICU, had Echo done yesterday difficult study but interpreted as severely reduced LV systolic function, overall euvolemic and normal vitals, no signs of HF or low output stat    -repeat TTE done today showed grossly normal biventricular systolic function with estimated LV EF 60-65%, I have reviewed prior study from yesterday overall difficult study but likely underestimated LV EF as grossly appears normal as well hence likely misinterpreted. Reviewed prior outside Echo studies from [facility] also have preserved LV EF in 2023. No GDMT indicated given normal LV EF without cardiomyopathy.   -no further cardiac testing indicated prior to eventual cerebral angiography and craniotomy for evacuation.   -uncontrolled HLD with LDL 190s need to resume high dose statin use when able  -reconsult if new cardiac issue arise        George Wright DO, [facility]  Faculty Non-Invasive Cardiologist  [phone]

## 2024-10-23 NOTE — SPEECH LANGUAGE PATHOLOGY EVALUATION - SLP DIAGNOSIS
Mod-severe receptive & severe expressive language deficits. Difficult to assess cognitive function at this time however guarded. Motor speech will be further assessed, however Pt with likely dysarthria & ?apraxic component.

## 2024-10-23 NOTE — PROGRESS NOTE ADULT - ASSESSMENT
ASSESSMENT AND PLAN:   Assessment: 58 yo M with PMHx of lung cancer (2021 ) s/p lobectomy transfer from [facility] [place] p/w migrane and aphasia followed by R facial and R sided weakness starting 4:30 PM. Found to have L posterior frontal cortical ICH  Plan:  - admit to NSICU, q1 neurochecks  - preop for angio tomorrow; MRI read   - repeat cthead 4-6 hours stable   - repeat 24 hour CT head from initial ct head at [facility] 2/2 Recent ASA use.   - MR brain w/ and w/o to rule out mets.   - CT CAP for further mets workup.   - Keppra 500 BID   - exam and imaging reviewed with Dr. Melendrez, recommendations as per attending.

## 2024-10-23 NOTE — SWALLOW BEDSIDE ASSESSMENT ADULT - SLP PERTINENT HISTORY OF CURRENT PROBLEM
58 yo M with PMHx of lung cancer (2021 ) s/p lobectomy transfer from [facility] p/w migrane and aphasia followed by R facial and R sided weakness starting 4:30 PM. Found to have L posterior frontal cortical ICH

## 2024-10-23 NOTE — CHART NOTE - NSCHARTNOTEFT_GEN_A_CORE
Neurointerventional Surgery  Pre-Procedure Note       HPI:  58 yo M with PMHX of lung cancer(diagnosed 2021) s/p R lobectomy ([facility] 2022) and HLD  p/w migraine and aphasia starting 4:30 pm 10/21/2024  followed by new onset R facial droop and R sided weakness starting this morning 10/22/2024. Wife states she gave one tablet of baby ASA around 4:30 pm yesterday for migraine. Patient denies daily use of AC/AP. Denies trauma. NIHSS 7 upon admission. Patient found to have L posterior frontal cortical ICH, transferred to [facility] for further care. Patient on cardene in ED, transferred to NeuroICU for monitoring. (22 Oct 2024 10:26)    Patient underwent MRI w/wo which showed likely mass but unable to r/o underlying vascular abnormality. He presents today for cerebral angiogram to assess vasculature.       Allergies: No Known Allergies      PAST MEDICAL & SURGICAL HISTORY:  Lung cancer      Social History:  patient lives with wife in ranch home. Denies hx of smoking, or illiciit drug use. Notes occasional recreational etoh use. (22 Oct 2024 10:26)      Current Medications:   chlorhexidine 2% Cloths 1 Application(s) Topical <User Schedule>  dextrose 50% Injectable 12.5 Gram(s) IV Push once  dextrose 50% Injectable 25 Gram(s) IV Push once  dextrose 50% Injectable 25 Gram(s) IV Push once  hydrALAZINE Injectable 10 milliGRAM(s) IV Push every 2 hours PRN  insulin lispro (ADMELOG) corrective regimen sliding scale   SubCutaneous every 6 hours  labetalol Injectable 10 milliGRAM(s) IV Push every 2 hours PRN  levETIRAcetam   Injectable 750 milliGRAM(s) IV Push every 12 hours      Physical Exam:  Constitutional: NAD, lying in bed  Neuro  * Mental Status:  GCS 15: Awake, alert, oriented to conversation. No aphasia or difficulty speaking. No dysarthria. Able to name objects and their function.  * Cranial Nerves: Cnii-Cnxii grossly intact. PERRL, EOMI, tongue midline, no gaze deviation  * Motor: RUE 5/5, LUE 5/5, RLE 5/5, LLE 5/5, no drift or dysmetria  * Sensory: Sensation intact to light touch  * Reflexes: not assessed   Cardiovascular: Regular rate and rhythm.  Eyes: See neurologic examination with detailed examination of eyes.  ENT: No JVD, Trachea Midline  Respiratory: non labored breathing   Gastrointestinal: Soft, nontender, nondistended.  Genitourinary: [ ] [place], [ x ] No [place].   Musculoskeletal: No muscle wasting noted, (See neurologic assessment for full muscle strength assessment)   Skin:  no wounds, no redness, no abrasions noted  Hematologic / Lymph / Immunologic: No bleeding from IV sites or wounds      NIH SS:  DATE: 10/23/24   TIME:  1A: Level of consciousness (0-3): 0  1B: Questions (0-2): 0    1C: Commands (0-2): 0  2: Gaze (0-2): 0  3: Visual fields (0-3): 0  4: Facial palsy (0-3): 0  MOTOR:  5A: Left arm motor drift (0-4): 0  5B: Right arm motor drift (0-4): 0  6A: Left leg motor drift (0-4): 0  6B: Right leg motor drift (0-4): 0  7: Limb ataxia (0-2): 0  SENSORY:  8: Sensation (0-2): 0  SPEECH:  9: Language (0-3): 0  10: Dysarthria (0-2): 0  EXTINCTION:  11: Extinction/inattention (0-2): 0    TOTAL SCORE:       Labs:                         13.0   13.22 )-----------( 287      ( 23 Oct 2024 04:08 )             38.8       10-23    139  |  101  |  11.2  ----------------------------<  77  4.1   |  22.0  |  0.83    Ca    9.0      23 Oct 2024 04:08  Phos  3.2     10-23  Mg     1.8     10-23    TPro  7.5  /  Alb  3.9  /  TBili  0.7  /  DBili  x   /  AST  18  /  ALT  9   /  AlkPhos  120  10-22    PT/INR - ( 23 Oct 2024 04:08 )   PT: 12.8 sec;   INR: 1.10 ratio    PTT - ( 23 Oct 2024 04:08 )  PTT:30.1 sec      Assessment/Plan:   This is a 59y  year old Male presents with L posterior frontal ICH. Patient presents to neuro-IR for selective cerebral angiography.     Procedure, goals, risks, benefits and alternatives  were discussed with patient's family.  All questions were answered.  Risks include but are not limited to stroke, vessel injury, hemorrhage, and or groin hematoma.  Patient demonstrates understanding  of all risks involved with this procedure and wishes to continue.   Appropriate  consent was obtained from patient and consent is in the patient's chart.

## 2024-10-23 NOTE — SWALLOW BEDSIDE ASSESSMENT ADULT - ORAL PREPARATORY PHASE
Within functional limits Within functional limits/Anterior loss of bolus Decreased mastication ability

## 2024-10-23 NOTE — SWALLOW BEDSIDE ASSESSMENT ADULT - SLP GENERAL OBSERVATIONS
Pt recd awake/upright in bed, A&A Ox2 via nonverbal gestural yes/no, language & cognitive deficits, nonverbal, 0/10 pain pre/post, tolerating RA NAD SpO2 93% throughout, multiple family members at bedside

## 2024-10-23 NOTE — CONSULT NOTE ADULT - PROBLEM SELECTOR RECOMMENDATION 2
.  - Pt admitted to Neuro ICU for left ICH, EEG in progress at bedside  - Repeat CTH performed 10/23 reveals small stable parenchymal hemorrhage with adjacent subarachnoid hemorrhage, new area of high attenuation  - Primary management per Neuro team  - Pt planned for OR possibly Friday with Neuro for angio to assess bleed  - Plan to repeat TTE to assess LV function, however, if still suboptimal may consider DANIEL  - Permissive hypertension goals as per Neuro team; when appropriate plan to start GDMT as tolerated by BP & HR and repeat echo in a few days

## 2024-10-23 NOTE — EEG REPORT - NS EEG TEXT BOX
REJI APONTE [id number]     Study Date: 10-22-24 18:22 to 10-23-24 08:00  Duration: 13 hr 22 min    --------------------------------------------------------------------------------------------------  History:  CC/ HPI Patient is a 59y old  Male who presents with a chief complaint of ICH (23 Oct 2024 00:06)    MEDICATIONS  (STANDING):  chlorhexidine 2% Cloths 1 Application(s) Topical <User Schedule>  dextrose 50% Injectable 12.5 Gram(s) IV Push once  dextrose 50% Injectable 25 Gram(s) IV Push once  dextrose 50% Injectable 25 Gram(s) IV Push once  insulin lispro (ADMELOG) corrective regimen sliding scale   SubCutaneous every 6 hours  levETIRAcetam   Injectable 750 milliGRAM(s) IV Push every 12 hours    --------------------------------------------------------------------------------------------------  Study Interpretation:    [[[Abbreviation Key:  PDR=alpha rhythm/posterior dominant rhythm. A-P=anterior posterior gradient.  Amplitude: ‘very low’:<20; ‘low’:20-50; ‘medium’:; ‘high’:>200uV.  Persistence for periodic/rhythmic patterns (% of epoch) ‘rare’:<1%; ‘occasional’:1-10%; ‘frequent’:10-50%; ‘abundant’:50-90%; ‘continuous’:>90%.  Persistence for sporadic discharges: ‘rare’:<1/hr; ‘occasional’:1/min-1/hr; ‘frequent’:>1/min; ‘abundant’:>1/10 sec.  GRDA=generalized rhythmic delta activity; FIRDA=frontal intermittent GRDA; LRDA=lateralized rhythmic delta activity; TIRDA=temporal intermittent rhythmic delta activity;  LPD=PLED=lateralized periodic discharges; GPD=generalized periodic discharges; BiPDs=BiPLEDs=bilateral independent periodic epileptiform discharges; SIRPID=stimulus induced rhythmic, periodic, or ictal appearing discharges; BIRDs=brief potentially ictal rhythmic discharges >4 Hz, lasting .5-10s; PFA=paroxysmal bursts of beta/gamma; LVFA=low voltage fast activity.  Modifiers: +F=with fast component; +S=with spike component; +R=with rhythmic component.  S-B=burst suppression pattern.  Max=maximal. N1-drowsy; N2-stage II sleep; N3-slow wave sleep. SSS/BETS=small sharp spikes/benign epileptiform transients of sleep. HV=hyperventilation; PS=photic stimulation]]]    FINDINGS:      Background:  Continuity: Continuous  Symmetry: Symmetric  PDR: 9 - 9.5 Hz activity, less well formed in the left hemisphere, with amplitude to 40 uV that attenuated to eye opening.    Reactivity: Present  Voltage: Normal (between 20-150uV)  Anterior Posterior Gradient: Present  Other background findings: None  Breach: Absent    Background Slowing:  Generalized slowing: None was present.  Focal slowing: Continuous left hemispheric polymorphic delta and theta activity.    State Changes:   -Drowsiness noted with increased slowing of the background.  -Present with N2 sleep transients with symmetric spindles and K-complexes.    Interictal Findings:  Left frontocentral lateralized periodic discharges (F3-C3 maximum), at a frequency of 0.5 - 2 Hz.    Electrographic and Electroclinical seizures:  Five electrographic seizures with left frontocentral onset were recorded at 6:02, 6:08, 6:17, 6:58, 7:28.   At 6:02, left sided LPDs increase in frequency from 0.5 Hz to up to 2 Hz, with evolution in faster frequencies lasting 10 seconds. Clinically, the patient had oral automatisms and eye blinking, although difficult to interpret with video quality. At 6:08, left sided LPDs again increased in frequency up to 2 Hz and evolved into faster frequencies for 8 seconds. Clinically, difficult to interpret due to video quality, patient appears to be having oral automatisms. At 6:17, LPDs increase in frequency faster than 2 Hz and evolve into faster frequencies for 1 minute in duration, although myogenic artifact and eye blinks are present and make it difficult to interpret electrographic data. At 6:58, LPDs increase up to 2 Hz and evolve into faster frequencies for 4 minutes in duration. Clinically, patient is having oral automatisms and fast eye blinking. At 7:28, LPDs increase to 2 Hz and increase in faster frequencies for 10 minutes in duration. Clinically, patient is having oral automatisms and fast eye blinking.    Other Clinical Events:  None    Activation Procedures:   -Hyperventilation was not performed.    -Photic stimulation was performed and did not elicit any abnormalities.       Artifacts:  Intermittent myogenic and movement artifacts were noted.    ECG:  The heart rate on single channel ECG was predominantly between 80 - 90 BPM.    EEG Classification / Summary:  Abnormal EEG study in the awake / drowsy / asleep states  Five electrographic seizures, left frontocentral onset, last recorded at 7:28  LPDs, left frontocentral (F3-C3 maximum), frequency of 0.5 - 2 Hz  Left hemispheric focal slowing, continuous    -----------------------------------------------------------------------------------------------------    Clinical Impression:  Abnormal EEG study  Focal seizures from the left frontocentral region, last seizure recorded at 7:28. Seizures last from 8 seconds to the last seizure of 10 minutes in duration. Clinically, patient is having oral automatisms and frequent eye blinking, although video quality makes it difficult to interpret.  Further increased risk of focal onset seizures from the left frontocentral region.  Left hemispheric focal cerebral dysfunction, which is likely structural in etiology.    This is a preliminary impression still pending attendings attestation.     -------------------------------------------------------------------------------------------------------  EEG reading room: [phone]    After-hours epilepsy service: [phone]      Abhi Kelly DO  Epilepsy Fellow   REJI APONTE [id number]     Study Date: 10-22-24 18:22 to 10-23-24 08:00  Duration: 13 hr 22 min    --------------------------------------------------------------------------------------------------  History:  CC/ HPI Patient is a 59y old  Male who presents with a chief complaint of ICH (23 Oct 2024 00:06)    MEDICATIONS  (STANDING):  chlorhexidine 2% Cloths 1 Application(s) Topical <User Schedule>  dextrose 50% Injectable 12.5 Gram(s) IV Push once  dextrose 50% Injectable 25 Gram(s) IV Push once  dextrose 50% Injectable 25 Gram(s) IV Push once  insulin lispro (ADMELOG) corrective regimen sliding scale   SubCutaneous every 6 hours  levETIRAcetam   Injectable 750 milliGRAM(s) IV Push every 12 hours    --------------------------------------------------------------------------------------------------  Study Interpretation:    [[[Abbreviation Key:  PDR=alpha rhythm/posterior dominant rhythm. A-P=anterior posterior gradient.  Amplitude: ‘very low’:<20; ‘low’:20-50; ‘medium’:; ‘high’:>200uV.  Persistence for periodic/rhythmic patterns (% of epoch) ‘rare’:<1%; ‘occasional’:1-10%; ‘frequent’:10-50%; ‘abundant’:50-90%; ‘continuous’:>90%.  Persistence for sporadic discharges: ‘rare’:<1/hr; ‘occasional’:1/min-1/hr; ‘frequent’:>1/min; ‘abundant’:>1/10 sec.  GRDA=generalized rhythmic delta activity; FIRDA=frontal intermittent GRDA; LRDA=lateralized rhythmic delta activity; TIRDA=temporal intermittent rhythmic delta activity;  LPD=PLED=lateralized periodic discharges; GPD=generalized periodic discharges; BiPDs=BiPLEDs=bilateral independent periodic epileptiform discharges; SIRPID=stimulus induced rhythmic, periodic, or ictal appearing discharges; BIRDs=brief potentially ictal rhythmic discharges >4 Hz, lasting .5-10s; PFA=paroxysmal bursts of beta/gamma; LVFA=low voltage fast activity.  Modifiers: +F=with fast component; +S=with spike component; +R=with rhythmic component.  S-B=burst suppression pattern.  Max=maximal. N1-drowsy; N2-stage II sleep; N3-slow wave sleep. SSS/BETS=small sharp spikes/benign epileptiform transients of sleep. HV=hyperventilation; PS=photic stimulation]]]    FINDINGS:      Background:  Continuity: Continuous  Symmetry: Symmetric  PDR: 9 - 9.5 Hz activity, less well formed in the left hemisphere, with amplitude to 40 uV that attenuated to eye opening.    Reactivity: Present  Voltage: Normal (between 20-150uV)  Anterior Posterior Gradient: Present  Other background findings: None  Breach: Absent    Background Slowing:  Generalized slowing: None was present.  Focal slowing: Continuous left hemispheric polymorphic delta and theta activity.    State Changes:   -Drowsiness noted with increased slowing of the background.  -Present with N2 sleep transients with symmetric spindles and K-complexes.    Interictal Findings:  Left frontocentral lateralized periodic discharges (F3-C3 maximum), at a frequency of 0.5 - 2 Hz.    Electrographic and Electroclinical seizures:  Five electrographic seizures with left frontocentral onset were recorded at 6:02, 6:08, 6:17, 6:58, 7:28.   At 6:02, left sided LPDs increase in frequency from 0.5 Hz to up to 2 Hz, with evolution in faster frequencies lasting 10 seconds. Clinically, the patient had oral automatisms and eye blinking, although difficult to interpret with video quality. At 6:08, left sided LPDs again increased in frequency up to 2 Hz and evolved into faster frequencies for 8 seconds. Clinically, difficult to interpret due to video quality, patient appears to be having oral automatisms. At 6:17, LPDs increase in frequency faster than 2 Hz and evolve into faster frequencies for 1 minute in duration, although myogenic artifact and eye blinks are present and make it difficult to interpret electrographic data. At 6:58, LPDs increase up to 2 Hz and evolve into faster frequencies for 4 minutes in duration. Clinically, patient is having oral automatisms and fast eye blinking. At 7:28, LPDs increase to 2 Hz and increase in faster frequencies for 10 minutes in duration. Clinically, patient is having oral automatisms and fast eye blinking.    Other Clinical Events:  None    Activation Procedures:   -Hyperventilation was not performed.    -Photic stimulation was performed and did not elicit any abnormalities.       Artifacts:  Intermittent myogenic and movement artifacts were noted.    ECG:  The heart rate on single channel ECG was predominantly between 80 - 90 BPM.    EEG Classification / Summary:  Abnormal EEG study in the awake / drowsy / asleep states  Five electrographic focal seizures recorded, left frontocentral onset, last recorded at 7:28am on 10/23/24  LPDs, left frontocentral (F3-C3 maximum), frequency of 0.5 - 2 Hz  Left hemispheric focal slowing, continuous    -----------------------------------------------------------------------------------------------------    Clinical Impression:  Abnormal EEG study  Five focal seizures recorded from the left frontocentral region as described above, last seizure recorded at 7:28am on 10/23/24. Clinically, patient is having oral automatisms and frequent eye blinking, although video quality makes it difficult to interpret.  Left hemispheric focal cerebral dysfunction, which is likely structural in etiology.    -------------------------------------------------------------------------------------------------------  EEG reading room: [phone]    After-hours epilepsy service: [phone]      Abhi Kelly DO  Epilepsy Fellow    Burke King MD  Neurology Attending Physician

## 2024-10-23 NOTE — PROGRESS NOTE ADULT - SUBJECTIVE AND OBJECTIVE BOX
HPI:  58 yo M with PMHX of lung cancer(diagnosed 2021) s/p R lobectomy ([facility] 2022) and HLD  p/w migrane and aphasia starting 4:30 pm 10/21/2024  followed by new onset R facial droop  and R sided weakness starting this morning 10/22/2024. Wife states she gave one tablet of baby ASA around 4:30 pm yesterdat for migrane. Patient denies daily use of AC/AP. Denies trauma. NIHSS 7 upon admission. Patient found to have L posterior frontal cortical ICH, transfered to [facility] for further care. Patient on cardene in ED, transfered to NeuroICU for monitoring.     NIH Stroke Scale:  - Intubated No  - Sedated/Unresponsive No  - NIH Stroke Scale: LOC (0) Alert; keenly responsive  - NIH Stroke Scale: LOC Question (0) Answers both questions correctly  - NIH Stroke Scale: LOC Command (0) Performs both tasks correctly  - NIH Stroke Scale: Gaze (0) Normal  - NIH Stroke Scale: Visual (0) No visual loss  - NIH Stroke Scale: Facial (3) Complete paralysis of one or both sides (absence  of facial movement in the upper and lower face)  - NIH Stroke Scale: Arm Left (0) No drift; limb holds 90 (or 45) degrees for  full 10 secs  - NIH Stroke Scale: Arm Right (1) Drift; limb holds 90 (or 45) degrees, but  drifts down before full 10 secs; does not hit bed or other support  - NIH Stroke Scale: Leg Left (0) No drift; leg holds 30 degree position for  full 5 secs  - NIH Stroke Scale: Leg Right (0) No drift; leg holds 30 degree position for  full 5 secs  - NIH Stroke Scale: Ataxia (0) Absent  - NIH Stroke Scale: Sensory (0) Normal; no sensory loss  - NIH Stroke Scale: Language (3) Mute, global aphasia; no usable speech or  auditory comprehension  - NIH Stroke Scale: Dysarthria (0) Normal  - NIH Stroke Scale: Extinct Inattention (0) No abnormality  - NIH Stroke Scale: Total 7      PMHx:   Lung cancer diagnosed in 2021, s/p R Lobectomy- follows with [facility]  HLD: admits noncompliant with medication    Allergies:   NKDA     (22 Oct 2024 10:26)      INTERVAL HPI/OVERNIGHT EVENTS:  10/22: while pt receiving TTE, began having L facial twitch. Loaded with keppra and started on 750 BID  59y Male seen lying comfortably in bed. Currently NPO. Voiding with clear urine. CTH stable. Plan for angio today    Vital Signs Last 24 Hrs  T(C): 37.4 (22 Oct 2024 11:25), Max: 37.4 (22 Oct 2024 10:34)  T(F): 99.4 (22 Oct 2024 11:25), Max: 99.4 (22 Oct 2024 10:34)  HR: 82 (22 Oct 2024 23:00) (70 - 104)  BP: 109/90 (22 Oct 2024 23:00) (104/77 - 138/103)  BP(mean): 97 (22 Oct 2024 23:00) (64 - 115)  RR: 14 (22 Oct 2024 23:00) (14 - 26)  SpO2: 98% (22 Oct 2024 23:00) (96% - 99%)    Parameters below as of 22 Oct 2024 18:00  Patient On (Oxygen Delivery Method): room air        PHYSICAL EXAM:  GENERAL: NAD, well-groomed  HEAD:  Atraumatic, normocephalic  MENTAL STATUS: AAO x1; Awake; Opens eyes spontaneously; expressive aphasia; mimicking but not FC  CRANIAL NERVES: PERRL. EOMI intact. R facial.  MOTOR: RUE 4/5, rest is 5/5  SENSATION: grossly intact to light touch all extremities  CHEST/LUNG: Chest rise and fall equally and bilaterally   HEART: +S1/+S2; Regular rate and rhythm   ABDOMEN: Soft, nontender, nondistended   EXTREMITIES:  2+ peripheral pulses, no clubbing, cyanosis, or edema  SKIN: Warm, dry; no visible rashes or lesions    LABS:                        13.5   13.56 )-----------( 319      ( 22 Oct 2024 09:30 )             40.3     10-22    139  |  102  |  9.5  ----------------------------<  108[H]  3.8   |  24.0  |  0.81    Ca    9.0      22 Oct 2024 09:30  Phos  2.4     10-22  Mg     2.0     10-22    TPro  7.5  /  Alb  3.9  /  TBili  0.7  /  DBili  x   /  AST  18  /  ALT  9   /  AlkPhos  120  10-22    PT/INR - ( 22 Oct 2024 09:30 )   PT: 12.8 sec;   INR: 1.13 ratio         PTT - ( 22 Oct 2024 09:30 )  PTT:29.1 sec  Urinalysis Basic - ( 22 Oct 2024 09:30 )    Color: x / Appearance: x / SG: x / pH: x  Gluc: 108 mg/dL / Ketone: x  / Bili: x / Urobili: x   Blood: x / Protein: x / Nitrite: x   Leuk Esterase: x / RBC: x / WBC x   Sq Epi: x / Non Sq Epi: x / Bacteria: x        10-22 @ 07:01  -  10-23 @ 00:07  --------------------------------------------------------  IN: 762.5 mL / OUT: 700 mL / NET: 62.5 mL        RADIOLOGY & ADDITIONAL TESTS:  < from: CT Head No Cont (10.22.24 @ 17:20) >  ACC: [id number] EXAM:  CT BRAIN   ORDERED BY: BREEZY ALLEN     PROCEDURE DATE:  10/22/2024          INTERPRETATION:  CLINICAL INFORMATION: Follow-up intracranial hemorrhage.    COMPARISON: MRI brain from 10/22/2024 at 4:56 PM and head CT from   10/22/2024 at 8:03 AM.    CONTRAST:  IV Contrast: NONE  Complications: None reported at time of study completion    TECHNIQUE:  Serial axial images were obtained from the skull base to the   vertex using multi-slice helical technique. Sagittal and coronal   reformats were obtained.    FINDINGS:    VENTRICLES AND SULCI: A partial effacement of the left lateral ventricle.    No hydrocephalus.  Trace blood products seen in the occipital horns on   MRI are not visible by CT technique  INTRA-AXIAL: Bilobed hemorrhage versus hemorrhagic mass in the left   frontal convexity is stable.  Mild surrounding edema and mass effect with   approximately 1.5 is midline shift to the right is stable.  EXTRA-AXIAL: Redemonstration of subarachnoid hemorrhage in the left  frontal convexity and left sylvian fissure.  A trace subdural hemorrhage   overlying the left cerebral convexity is better visualized on recently   performed MRI.    VISUALIZED SINUSES:  Scattered mild mucosal thickening.  TYMPANOMASTOID CAVITIES:Small right mastoid effusion.  VISUALIZED ORBITS: Normal.  CALVARIUM: Intact.    MISCELLANEOUS: None.      IMPRESSION:  Stable exam.  Bilobed hemorrhage versus hemorrhagic mass in the left frontal convexity   is stable.  Subarachnoid hemorrhage in the left frontal convexity and left sylvian   fissure is stable.  Trace subdural hemorrhage overlying left cerebral convexity and trace   intraventricular blood products in the occipital horns of both lateral   ventricles are better visualized on the recently performed MRI..      < end of copied text >    < from: MR Brain Stereotactic w/wo IV Cont (10.22.24 @ 16:56) >  ACC: [id number] EXAM:  MR VENOGRAM BRAIN IC   ORDERED BY: MANJIT CASTILLO     ACC: [id number] EXAM:  MR BRAIN WAW IC FOR SRS   ORDERED BY: MARIA FERNANDA ZENG     PROCEDURE DATE:  10/22/2024          INTERPRETATION:  CLINICAL INFORMATION: Left intraparenchymal hemorrhage.    Evaluate filling defect in the left transverse sinus seen on CTA.    COMPARISON: CTA neck/brain from 10/22/2024 at 8:03 AM.    CONTRAST/COMPLICATIONS:  IV Contrast: Gadavist  6 cc administered   1.5 cc discarded  Complications: Nonereported at time of study completion      TECHNIQUE:  1.  MRI Brain was performed using the following sequences: Sagittal T1   FLAIR, axial DWI, axial T1 FLAIR, axial T2 FLAIR, axial SWI, 3-D sagittal   T2 CUBE, axial T2*GRE, axial T2 propeller, postcontrast axial T1 MPRAGE   with sagittal and coronal reformats, postcontrast axial T1 FLAIR  2.  MRI venogram brain was performed using the following sequences:   Noncontrast MR venogram was performed using 3-D phase contrast in the   sagittal plane and 2-D time-of-flight imaging in the coronal plane.    Postcontrast MR venogram was performed in the sagittal plane.  MIP   reformats were performed.    FINDINGS:  MRI BRAIN:  VENTRICLES AND SULCI: Mild partial effacement of the left lateral   ventricle.  Otherwise normal in size and configuration.  No   hydrocephalus.  Trace susceptibility in the occipital horns of both   lateral ventricles is compatible with intraventricular hemorrhage (4:18).  INTRA-AXIAL: Bilobed hemorrhage in the left frontalconvexity.  The more   inferior component measures approximately 3.4 x 2.6 x 2.3 cm (AP x LR x   CC) (3:22-23, 6:23, 4:76-77, and 2:22-23).  The more superior component   measures approximately 2.2 x 2.5 x 2 cm and demonstrates a fluid/fluid   level (3:26, 6:26, 4:83 and 2:22).  There is patchy peripheral diffusion   restriction and patchy peripheral nodular enhancement, some of which may   be leptomeningeal; enhancement extends to the dura..  The findings are   suspicious for underlying hemorrhagic mass.  Superimposed infarct or   hemorrhagic conversion of an infarct is not excluded. There is mild   surrounding vasogenic edema and mild regional mass effect, with   approximately 1.5 mm of midline shift to the right.  A few scattered   punctate foci of T2 FLAIR signal hyperintensity in the deep/subcortical   white matter are nonspecific in etiology.  EXTRA-AXIAL: There is redemonstration of mild subarachnoid hemorrhage in   the left frontal convexity and left sylvian fissure.  A trace subdural   hemorrhage overlying the left the ventral aspect of the left   frontoparietal convexity and left temporal lobe measures approximately 1   mm in caliber (3:17).  There is mild dural thickening and enhancement   overlying the left cerebral laxity, measuring up to 2.5 mm (5b:29).    There is possible leptomeningeal enhancement surrounding the hemorrhage..    SINUSES:  Scattered mild mucosal thickening.  MASTOIDS:  Small mastoid effusions bilaterally, right greater than left.  ORBITS: Normal.  CALVARIUM: Intact.    MISCELLANEOUS: The dural venous sinuses and cavernous sinuses are patent   on the postcontrast MPRAGE sequence; however there appears to be a thin   linear nonocclusive filling defect in the lateral aspect of the left   transverse sinus extending to the junction with the left sigmoid sinus   ([phone]).    MRI VENOGRAM BRAIN:  TECHNICAL LIMITATIONS: There are flow artifacts on the noncontrast MR   venogram images.  Postcontrast MR venogram was only obtained in the   midline and parasagittal regions.  The lateral aspects of the transverse   sinuses, sigmoid sinuses and the jugular bulbs are excluded from the   field-of-view on postcontrast MR venogram.  SUPERIOR SAGITTAL SINUS: Patent.  RIGHT TRANSVERSE AND SIGMOID SINUS: Patent.  LEFT TRANSVERSE AND SIGMOID SINUS: Congenitally hypoplastic.  Limited   evaluation.  INTERNAL JUGULAR VEINS: The right internal jugular bulb is patent.    Limited evaluation of a congenitally hypoplastic left jugular bulb.  INTERNAL CEREBRAL VEINS: Patent bilaterally.  VEIN OF LINDA: Patent.  STRAIGHT SINUS: Patent.    MISCELLANEOUS: None.    IMPRESSION:  MRI BRAIN:  1.  A bilobed hemorrhage in the left frontal convexity demonstrates   patchy peripheral diffusion restriction and demonstrates patchy   peripheral nodular enhancement, some of which may be leptomeningeal, and   which extends to the dura.  The findings are suspicious for an underlying   hemorrhagic mass.  Superimposed cerebral infarction or hemorrhagic   conversion of an infarct is not excluded.  Mild dural thickening and   enhancement overlying the left cerebral convexity may be reactive or   represent tumor invasion.  2.  There is mild subarachnoid hemorrhage in the left frontal region and   left sylvian fissure.  3.  There is trace subdural hemorrhage overlying the left renal   convexity, measuring 1 mm in caliber.  4.  There is trace intraventricular hemorrhage dependently in the   occipital horns of both lateral ventricles.  No hydrocephalus.  5.  The duralvenous sinuses and cavernous sinuses are patent on the   postcontrast MPRAGE sequence; however there appears to be a thin linear   nonocclusive filling defect in the lateral aspect the left transverse   sinus extending to the junction with the left sigmoid sinus, which is   suspicious for nonocclusive thrombus.    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  Evaluation of the hypoplastic left transverse   sinus, left sigmoid sinus and left jugular bulb is limited by flow   artifacts on the noncontrast MR venogram and incomplete imaging on the   postcontrast MR venogram.  There is suspected nonocclusive thrombus in   the left transverse sinus on the postcontrast MPRAGEimages of the brain   as discussed above.  Repeat postcontrast MR venogram of the entire brain   or dedicated CT venogram may be obtained for further evaluation.    The remainder of the dural venous sinuses are patent without suspicious   filling defect.    < end of copied text >

## 2024-10-23 NOTE — CHART NOTE - NSCHARTNOTEFT_GEN_A_CORE
Neurointerventional Surgery Post Procedure Note    Procedure: Selective Cerebral Angiography     Pre- Procedure Diagnosis: L frontal hemorrhage  Post- Procedure Diagnosis:     : Dr. Owens  Physician Assistant: Laura Wood   Nurse: RN  Anesthesiologist:                                            Radiology Tech:     Sheath:  4 Cameroonian Sheath    I/Os: estimated blood loss less than 20cc,  IV fluids cc, Urine output 0cc, Contrast: Omnipaque 240   cc    Vitals:  BP   HR   Spo2  %    Preliminary Report:  Under a 4 Cameroonian short sheath via the right groin under MAC sedation via left vertebral artery, left internal carotid artery, left external carotid artery, right vertebral artery, right internal carotid artery, right external carotid artery, a selective cerebral angiography  was performed and reveals       . ( Official note to follow).    Patient tolerated procedure well.  Patient remains hemodynamically stable, no change in neurological status compared to baseline.  Results were discussed with patient, patient's family and Neurosurgery.  Right groin sheath  was discontinued. Hemostasis was obtained with approximately 15 minutes of manual compression.     No active bleeding, no hematoma, no ecchymosis.   Quick clot and safeguard balloon dressing applied at   Patient transferred to neuro ICU in stable condition.

## 2024-10-23 NOTE — PROGRESS NOTE ADULT - SUBJECTIVE AND OBJECTIVE BOX
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, transfered to [facility] for further care    24hr EVENTS:  10/22 - Admitted to NSICU, TTE with severely reduced EF although technically limited study  10/23 - MRI consistent with most likely neoplastic eitiology    -----------------------------------------------------------------------------------------------------------------------------------------------------------------------------------    PHYSICAL EXAM:  Neurological:   - Awake, alert & oriented to person, place and time (with choices)   - Following Commands, expressive aphasia  - PERRL, EOMI, Visual fields intact,, + R facial asymmetry.   - Muscle Strength: RUE: 4+/5 + Slight R drift  H/5  LUE: 5/5 RLE: 4+/5 LLE: 5/5  No Drift   - Sensation Intact to Light Touch     CV: RRR  Pulm: clear to auscultation  Abd: Soft, nontender, nondistended  Ext: no noted edema in lower ext  Skin: warm, dry      -----------------------------------------------------------------------------------------------------------------------------------------------------------------------------------  ICU Vital Signs Last 24 Hrs  T(C): 36.8 (23 Oct 2024 08:14), Max: 36.8 (23 Oct 2024 00:32)  T(F): 98.3 (23 Oct 2024 08:14), Max: 98.3 (23 Oct 2024 08:14)  HR: 94 (23 Oct 2024 11:00) (81 - 94)  BP: 128/113 (23 Oct 2024 11:00) (104/77 - 135/104)  BP(mean): 120 (23 Oct 2024 11:00) (86 - 120)  ABP: --  ABP(mean): --  RR: 23 (23 Oct 2024 11:00) (11 - 26)  SpO2: 98% (23 Oct 2024 11:00) (95% - 99%)    O2 Parameters below as of 23 Oct 2024 08:00  Patient On (Oxygen Delivery Method): room air            I&O's Summary    22 Oct 2024 07:  -  23 Oct 2024 07:00  --------------------------------------------------------  IN: 812.5 mL / OUT: 950 mL / NET: -137.5 mL    23 Oct 2024 07:  -  23 Oct 2024 12:27  --------------------------------------------------------  IN: 0 mL / OUT: 300 mL / NET: -300 mL        MEDICATIONS  (STANDING):  chlorhexidine 2% Cloths 1 Application(s) Topical <User Schedule>  dextrose 50% Injectable 12.5 Gram(s) IV Push once  dextrose 50% Injectable 25 Gram(s) IV Push once  dextrose 50% Injectable 25 Gram(s) IV Push once  insulin lispro (ADMELOG) corrective regimen sliding scale   SubCutaneous every 6 hours  levETIRAcetam   Injectable 2000 milliGRAM(s) IV Push once  levETIRAcetam   Injectable 1500 milliGRAM(s) IV Push every 12 hours      RESPIRATORY:        IMAGING:   Recent imaging studies were reviewed.    LAB RESULTS:                          13.0   13.22 )-----------( 287      ( 23 Oct 2024 04:08 )             38.8       PT/INR - ( 23 Oct 2024 04:08 )   PT: 12.8 sec;   INR: 1.10 ratio         PTT - ( 23 Oct 2024 04:08 )  PTT:30.1 sec    10-23    139  |  101  |  11.2  ----------------------------<  77  4.1   |  22.0  |  0.83    Ca    9.0      23 Oct 2024 04:08  Phos  3.2     10  Mg     1.8     10-23    TPro  7.5  /  Alb  3.9  /  TBili  0.7  /  DBili  x   /  AST  18  /  ALT  9   /  AlkPhos  120  10-22

## 2024-10-23 NOTE — SWALLOW BEDSIDE ASSESSMENT ADULT - DIET PRIOR TO ADMI
reg/thin as per wife report however states recent coughing w/solids during oral intake x ~2-3 months. Had appt with ENT for eval though now admitted.

## 2024-10-23 NOTE — SWALLOW BEDSIDE ASSESSMENT ADULT - PHARYNGEAL PHASE
Within functional limits Delayed pharyngeal swallow/Within functional limits Throat clear post oral intake

## 2024-10-23 NOTE — CONSULT NOTE ADULT - PROBLEM SELECTOR RECOMMENDATION 3
.  Cardiac Risk Stratification for Procedure  - Pt planned for OR possibly Friday with Neuro for angio to assess bleed  - Plan to repeat TTE to assess LV function, however, if still suboptimal may consider DANIEL   - Non-ischemic EKG; NSR  - RCRI Risk Stratification: Class III Risk, 10.1% Risk of Major Cardiac Event  - No active chest pain, evidence of ischemia, decompensated CHF, significant valvular abnormality, or unstable arrhythmia therefore no absolute cardiac contraindication to the planned procedure.  - Patient is at elevated risk due to comorbidities, however there is no further cardiac intervention that will improve this risk.   - Benefits of procedure outweigh risks. Patient is optimized. Proceed for procedure as planned.  - Patient's risk profile is generated using RCRI which is applicable for any procedure/surgery except cardiac surgery.

## 2024-10-23 NOTE — SWALLOW BEDSIDE ASSESSMENT ADULT - SWALLOW EVAL: DIAGNOSIS
Min oral dysphagia notable for slightly prolonged mastication w/delayed oral transfer of easy to chew & regular. Improved w/soft & bite-sized. Consistent trace-min R sided anterior loss of both solids & liquids, more apparent with liquids. Reduced sensory awareness demonstrated. No oral stasis. Pharyngeal dysphagia suspected, Pt with suspected delayed initiation of swallow trigger as per digital palpation, slight throat clear post intake easy to chew & regular. No overt s/s penetration or aspiration reduplicated w/soft & bite-sized, or thins.

## 2024-10-23 NOTE — CONSULT NOTE ADULT - SUBJECTIVE AND OBJECTIVE BOX
[facility]                                              CARDIOLOGY AT [place] [address]                                             Telephone: [phone]. Fax:[phone]                                                       CARDIOLOGY CONSULTATION NOTE                                                                                             History obtained by: Patient and medical record  Community Cardiologist: N/A   obtained: Yes [  ] No [ x ]  Reason for Consultation: CVA, reduced EF on TTE  Available out pt records reviewed: Yes [  ] No [ x ]    Chief complaint:    Patient is a 59y old Male who presents with a chief complaint of ICH (23 Oct 2024 00:06)    HPI:  59 y.o. male with PMHx of lung cancer (diagnosed 2021) s/p right lobectomy ([facility] 2022), and HLD presented with migraine and expressive aphasia that started 4:30pm on 10/21/2024. Pt also had new onset right facial droop and right sided weakness that started morning of 10/22/2024. Pt took ASA 81 mg on the 21st when symptoms began. Upon admission NIH of 7 and left posterior frontal cortical ICH noted on CTH. TTE performed on 10/22/24, technically difficult study revealed severely reduced EF < 20%, no effusion or evidence of LV thrombus noted. Pt currently in NeuroICU, EEG in progress. Persistent expressive aphasia.    CARDIAC TESTING   ECHO:   TTE Performed 3/20/2023  - No apical views, normal LV size and thickness, limited assessment of wall motion but no definite WMA, EF 55%, RV not assessed    TTE Performed 10/22/2024    < from: TTE W or WO Ultrasound Enhancing Agent (10.22.24 @ 14:18) >  CONCLUSIONS:     1. This is a suboptimal and limited study, May consider DANIEL for further evaluation.   2. Left ventricular cavity is normal in size. Left ventricular systolic function is severely decreased with an ejection fraction visually estimated at <20 %.   3. No pericardial effusion seen.   4. No prior echocardiogram is available for comparison.   5. There is no evidence of a left ventricular thrombus.    < end of copied text >    PAST MEDICAL HISTORY  Lung cancer    PAST SURGICAL HISTORY      SOCIAL HISTORY:    CIGARETTES:     ALCOHOL: Occasionally   DRUGS:    FAMILY HISTORY:    Family History of Cardiovascular Disease:  Yes [  ] No [  ]  Coronary Artery Disease in first degree relative: Yes [  ] No [  ]  Sudden Cardiac Death in First degree relative: Yes [  ] No [  ]    HOME MEDICATIONS:  rosuvastatin 10 mg oral tablet: 1 tab(s) orally once a day ** Patient not actively taking ** (22 Oct 2024 11:54)      CURRENT CARDIAC MEDICATIONS:  hydrALAZINE Injectable 10 milliGRAM(s) IV Push every 2 hours PRN SBP>150  labetalol Injectable 10 milliGRAM(s) IV Push every 2 hours PRN SBP>150      CURRENT OTHER MEDICATIONS:  levETIRAcetam   Injectable 750 milliGRAM(s) IV Push every 12 hours  chlorhexidine 2% Cloths 1 Application(s) Topical <User Schedule>  dextrose 50% Injectable 12.5 Gram(s) IV Push once, Stop order after: 1 Doses  dextrose 50% Injectable 25 Gram(s) IV Push once, Stop order after: 1 Doses  dextrose 50% Injectable 25 Gram(s) IV Push once, Stop order after: 1 Doses  insulin lispro (ADMELOG) corrective regimen sliding scale   SubCutaneous every 6 hours      ALLERGIES:   No Known Allergies      REVIEW OF SYMPTOMS:   Due to current medical condition, subjective information was not able to be obtained from the patient. History was obtained, to the extent possible, from review of the chart and collateral sources of information.     VITAL SIGNS:  T(C): 36.8 (10-23-24 @ 08:14), Max: 37.4 (10-22-24 @ 11:25)  T(F): 98.3 (10-23-24 @ 08:14), Max: 99.4 (10-22-24 @ 11:25)  HR: 91 (10-23-24 @ 09:00) (81 - 96)  BP: 122/94 (10-23-24 @ 09:00) (104/77 - 138/103)  RR: 20 (10-23-24 @ 09:00) (11 - 26)  SpO2: 96% (10-23-24 @ 09:00) (95% - 99%)    INTAKE AND OUTPUT:     10-22 @ 07:01  -  10-23 @ 07:00  --------------------------------------------------------  IN: 812.5 mL / OUT: 950 mL / NET: -137.5 mL    10-23 @ 07:01  -  10-23 @ 11:23  --------------------------------------------------------  IN: 0 mL / OUT: 300 mL / NET: -300 mL        PHYSICAL EXAM:  Constitutional: Comfortable . No acute distress.   HEENT: Atraumatic and normocephalic, neck is supple. no JVD. No carotid bruit.  CNS: +Expressive aphasia. +Right sided facial droop. +Right sided upper and lower extremity weakness.   Respiratory: CTAB, unlabored   Cardiovascular: RRR normal s1 s2. No murmur. No rubs or gallop.  Gastrointestinal: Soft, non-tender. +Bowel sounds.   Extremities: 2+ Peripheral Pulses, No clubbing, cyanosis, or edema  Psychiatric: Calm. no agitation.   Skin: Warm and dry, no ulcers on extremities     LABS:                        13.0   13.22 )-----------( 287      ( 23 Oct 2024 04:08 )             38.8     10-23    139  |  101  |  11.2  ----------------------------<  77  4.1   |  22.0  |  0.83    Ca    9.0      23 Oct 2024 04:08  Phos  3.2     10-23  Mg     1.8     10-23    TPro  7.5  /  Alb  3.9  /  TBili  0.7  /  DBili  x   /  AST  18  /  ALT  9   /  AlkPhos  120  10-22    PT/INR - ( 23 Oct 2024 04:08 )   PT: 12.8 sec;   INR: 1.10 ratio         PTT - ( 23 Oct 2024 04:08 )  PTT:30.1 sec  Urinalysis Basic - ( 23 Oct 2024 04:08 )    Color: x / Appearance: x / SG: x / pH: x  Gluc: 77 mg/dL / Ketone: x  / Bili: x / Urobili: x   Blood: x / Protein: x / Nitrite: x   Leuk Esterase: x / RBC: x / WBC x   Sq Epi: x / Non Sq Epi: x / Bacteria: x      10-23-24 @ 04:08  CHolesterol: 258,  HDL: 48,  LDL: 190, Triglycerides: 101       Thyroid Stimulating Hormone, Serum: 2.98 uIU/mL (10-23-24 @ 04:08)      INTERPRETATION OF TELEMETRY: SR    ECG: SR  Prior ECG: Yes [ x ] No [  ]    RADIOLOGY & ADDITIONAL STUDIES:   CT scan:   < from: CT Head No Cont (10.23.24 @ 08:51) >  IMPRESSION: Small stable parenchymal hemorrhage with adjacent   subarachnoid hemorrhage.    New area of high attenuation is seen involving the inferior right   parietal cortex which could be compatible with a new area of parenchymal   hemorrhage.    --- End of Report ---    < end of copied text >    < from: CT Abdomen and Pelvis w/ IV Cont (10.22.24 @ 17:31) >  IMPRESSION:  *  No evidence of metastatic disease or suspicious adenopathy in the   chest, abdomen, or pelvis.  *  Right pneumonectomy changes, similar to findings present on prior   exams.        --- End of Report ---    < end of copied text >    MRI:   < from: MR Brain Stereotactic w/wo IV Cont (10.22.24 @ 16:56) >  IMPRESSION:  MRI BRAIN:  1.  A bilobed hemorrhage in the left frontal convexity demonstrates   patchy peripheral diffusion restriction and demonstrates patchy   peripheral nodular enhancement, some of which may be leptomeningeal, and   which extends to the dura.  The findings are suspicious for an underlying   hemorrhagic mass.  Superimposed cerebral infarction or hemorrhagic   conversion of an infarct is not excluded.  Mild dural thickening and   enhancement overlying the left cerebral convexity may be reactive or   represent tumor invasion.  2.  There is mild subarachnoid hemorrhage in the left frontal region and   left sylvian fissure.  3.  There is trace subdural hemorrhage overlying the left renal   convexity, measuring 1 mm in caliber.  4.  There is trace intraventricular hemorrhage dependently in the   occipital horns of both lateral ventricles.  No hydrocephalus.  5.  The duralvenous sinuses and cavernous sinuses are patent on the   postcontrast MPRAGE sequence; however there appears to be a thin linear   nonocclusive filling defect in the lateral aspect the left transverse   sinus extending to the junction with the left sigmoid sinus, which is   suspicious for nonocclusive thrombus.    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  Evaluation of the hypoplastic left transverse   sinus, left sigmoid sinus and left jugular bulb is limited by flow   artifacts on the noncontrast MR venogram and incomplete imaging on the   postcontrast MR venogram.  There is suspected nonocclusive thrombus in   the left transverse sinus on the postcontrast MPRAGEimages of the brain   as discussed above.  Repeat postcontrast MR venogram of the entire brain   or dedicated CT venogram may be obtained for further evaluation.    The remainder of the dural venous sinuses are patent without suspicious   filling defect.    --- End of Report ---    < end of copied text >

## 2024-10-23 NOTE — SPEECH LANGUAGE PATHOLOGY EVALUATION - SLP PERTINENT HISTORY OF CURRENT PROBLEM
58 yo M with PMHx of lung cancer (2021 ) s/p lobectomy transfer from [facility] p/w migrane and aphasia followed by R facial and R sided weakness starting 4:30 PM. Found to have L posterior frontal cortical ICH

## 2024-10-24 ENCOUNTER — APPOINTMENT (OUTPATIENT)
Dept: NEUROLOGY | Facility: HOSPITAL | Age: 59
End: 2024-10-24

## 2024-10-24 PROBLEM — C34.90 MALIGNANT NEOPLASM OF UNSPECIFIED PART OF UNSPECIFIED BRONCHUS OR LUNG: Chronic | Status: ACTIVE | Noted: 2024-10-22

## 2024-10-24 LAB
ANION GAP SERPL CALC-SCNC: 12 MMOL/L — SIGNIFICANT CHANGE UP (ref 5–17)
BLD GP AB SCN SERPL QL: SIGNIFICANT CHANGE UP
BUN SERPL-MCNC: 12.2 MG/DL — SIGNIFICANT CHANGE UP (ref 8–20)
CALCIUM SERPL-MCNC: 9.1 MG/DL — SIGNIFICANT CHANGE UP (ref 8.4–10.5)
CHLORIDE SERPL-SCNC: 102 MMOL/L — SIGNIFICANT CHANGE UP (ref 96–108)
CO2 SERPL-SCNC: 24 MMOL/L — SIGNIFICANT CHANGE UP (ref 22–29)
CREAT SERPL-MCNC: 0.75 MG/DL — SIGNIFICANT CHANGE UP (ref 0.5–1.3)
EGFR: 103 ML/MIN/1.73M2 — SIGNIFICANT CHANGE UP
GLUCOSE BLDC GLUCOMTR-MCNC: 135 MG/DL — HIGH (ref 70–99)
GLUCOSE BLDC GLUCOMTR-MCNC: 181 MG/DL — HIGH (ref 70–99)
GLUCOSE SERPL-MCNC: 141 MG/DL — HIGH (ref 70–99)
HCT VFR BLD CALC: 40 % — SIGNIFICANT CHANGE UP (ref 39–50)
HGB BLD-MCNC: 13.4 G/DL — SIGNIFICANT CHANGE UP (ref 13–17)
MAGNESIUM SERPL-MCNC: 2.4 MG/DL — SIGNIFICANT CHANGE UP (ref 1.6–2.6)
MCHC RBC-ENTMCNC: 28.9 PG — SIGNIFICANT CHANGE UP (ref 27–34)
MCHC RBC-ENTMCNC: 33.5 GM/DL — SIGNIFICANT CHANGE UP (ref 32–36)
MCV RBC AUTO: 86.4 FL — SIGNIFICANT CHANGE UP (ref 80–100)
NT-PROBNP SERPL-SCNC: 43 PG/ML — SIGNIFICANT CHANGE UP (ref 0–300)
PHOSPHATE SERPL-MCNC: 2.5 MG/DL — SIGNIFICANT CHANGE UP (ref 2.4–4.7)
PLATELET # BLD AUTO: 296 K/UL — SIGNIFICANT CHANGE UP (ref 150–400)
POTASSIUM SERPL-MCNC: 4.8 MMOL/L — SIGNIFICANT CHANGE UP (ref 3.5–5.3)
POTASSIUM SERPL-SCNC: 4.8 MMOL/L — SIGNIFICANT CHANGE UP (ref 3.5–5.3)
RBC # BLD: 4.63 M/UL — SIGNIFICANT CHANGE UP (ref 4.2–5.8)
RBC # FLD: 14.5 % — SIGNIFICANT CHANGE UP (ref 10.3–14.5)
SODIUM SERPL-SCNC: 138 MMOL/L — SIGNIFICANT CHANGE UP (ref 135–145)
TROPONIN T, HIGH SENSITIVITY RESULT: 11 NG/L — SIGNIFICANT CHANGE UP (ref 0–51)
WBC # BLD: 12.77 K/UL — HIGH (ref 3.8–10.5)
WBC # FLD AUTO: 12.77 K/UL — HIGH (ref 3.8–10.5)

## 2024-10-24 PROCEDURE — 36226 PLACE CATH VERTEBRAL ART: CPT | Mod: RT

## 2024-10-24 PROCEDURE — 36227 PLACE CATH XTRNL CAROTID: CPT | Mod: RT

## 2024-10-24 PROCEDURE — 36224 PLACE CATH CAROTD ART: CPT | Mod: RT

## 2024-10-24 PROCEDURE — 99291 CRITICAL CARE FIRST HOUR: CPT

## 2024-10-24 PROCEDURE — 95718 EEG PHYS/QHP 2-12 HR W/VEEG: CPT

## 2024-10-24 PROCEDURE — 36223 PLACE CATH CAROTID/INOM ART: CPT | Mod: 59,LT

## 2024-10-24 RX ORDER — SODIUM PHOSPHATE, MONOBASIC, MONOHYDRATE AND SODIUM PHOSPHATE, DIBASIC ANHYDROUS 276; 142 MG/ML; MG/ML
15 INJECTION, SOLUTION INTRAVENOUS ONCE
Refills: 0 | Status: COMPLETED | OUTPATIENT
Start: 2024-10-24 | End: 2024-10-24

## 2024-10-24 RX ADMIN — SODIUM CHLORIDE 50 MILLILITER(S): 9 INJECTION, SOLUTION INTRAMUSCULAR; INTRAVENOUS; SUBCUTANEOUS at 11:15

## 2024-10-24 RX ADMIN — SODIUM PHOSPHATE, MONOBASIC, MONOHYDRATE AND SODIUM PHOSPHATE, DIBASIC ANHYDROUS 63.75 MILLIMOLE(S): 276; 142 INJECTION, SOLUTION INTRAVENOUS at 10:36

## 2024-10-24 RX ADMIN — Medication 2 TABLET(S): at 21:03

## 2024-10-24 RX ADMIN — LEVETIRACETAM 1500 MILLIGRAM(S): 500 TABLET, FILM COATED ORAL at 00:35

## 2024-10-24 RX ADMIN — LEVETIRACETAM 1500 MILLIGRAM(S): 500 TABLET, FILM COATED ORAL at 11:17

## 2024-10-24 RX ADMIN — SODIUM CHLORIDE 50 MILLILITER(S): 9 INJECTION, SOLUTION INTRAMUSCULAR; INTRAVENOUS; SUBCUTANEOUS at 00:35

## 2024-10-24 RX ADMIN — DEXAMETHASONE 1.5 MG 4 MILLIGRAM(S): 1.5 TABLET ORAL at 00:36

## 2024-10-24 RX ADMIN — CHLORHEXIDINE GLUCONATE 1 APPLICATION(S): 40 SOLUTION TOPICAL at 05:28

## 2024-10-24 RX ADMIN — DEXAMETHASONE 1.5 MG 4 MILLIGRAM(S): 1.5 TABLET ORAL at 11:17

## 2024-10-24 RX ADMIN — DEXAMETHASONE 1.5 MG 4 MILLIGRAM(S): 1.5 TABLET ORAL at 05:28

## 2024-10-24 RX ADMIN — POLYETHYLENE GLYCOL 3350 17 GRAM(S): 17 POWDER, FOR SOLUTION ORAL at 11:17

## 2024-10-24 RX ADMIN — DEXAMETHASONE 1.5 MG 4 MILLIGRAM(S): 1.5 TABLET ORAL at 17:04

## 2024-10-24 RX ADMIN — SODIUM CHLORIDE 50 MILLILITER(S): 9 INJECTION, SOLUTION INTRAMUSCULAR; INTRAVENOUS; SUBCUTANEOUS at 21:03

## 2024-10-24 RX ADMIN — Medication 2: at 11:16

## 2024-10-24 RX ADMIN — LEVETIRACETAM 1500 MILLIGRAM(S): 500 TABLET, FILM COATED ORAL at 23:22

## 2024-10-24 RX ADMIN — DEXAMETHASONE 1.5 MG 4 MILLIGRAM(S): 1.5 TABLET ORAL at 23:22

## 2024-10-24 NOTE — DIETITIAN INITIAL EVALUATION ADULT - PERTINENT LABORATORY DATA
10-24    138  |  102  |  12.2  ----------------------------<  141[H]  4.8   |  24.0  |  0.75    Ca    9.1      24 Oct 2024 03:30  Phos  2.5     10-24  Mg     2.4     10-24    POCT Blood Glucose.: 181 mg/dL (10-24-24 @ 11:11)  A1C with Estimated Average Glucose Result: 6.0 % (10-23-24 @ 04:08)

## 2024-10-24 NOTE — CHART NOTE - NSCHARTNOTEFT_GEN_A_CORE
Neurointerventional Surgery Post Procedure Note    Procedure: Selective Cerebral Angiography     Pre- Procedure Diagnosis: Left frontal parietal IPH   Post- Procedure Diagnosis: negative cerebral angiogram, no vascular abnormality seen     : Dr. Hong MD  Nurse Practitioner: Grace Marr NP   Nurse: HIGINIO FREEMAN and Vince OCONNELL   Anesthesiologist: Dr. Ruelas                                           Radiology Tech: Kun F and     Sheath:  5/6 Cypriot slender radial sheath    I/Os: estimated blood loss less than 10cc,  IV fluids 300 cc, Urine output 0 cc, Contrast: Omnipaque 240 82  cc,     Vitals:  /86  HR 82  Spo2 100  %    Preliminary Report:  Under a  Cypriot short sheath via the right groin under MAC sedation via left vertebral artery, left internal carotid artery, left external carotid artery, right vertebral artery, right internal carotid artery, right external carotid artery, a selective cerebral angiography  was performed and reveals no vascular abnormality. ( Official note to follow).    Patient tolerated procedure well.  Patient remains hemodynamically stable, no change in neurological status compared to baseline.  Results were discussed with patient, patient's family and Neurosurgery.  Right radial sheath was discontinued at 0923. Hemostasis was obtained with approximately 12 cc of air.     No active bleeding, no hematoma, no ecchymosis. TR band was applied at 0923.   Patient transferred to recovery room in stable condition.

## 2024-10-24 NOTE — CHART NOTE - NSCHARTNOTEFT_GEN_A_CORE
Neurointerventional Surgery  Pre-Procedure Note     This is a 59y ____ hand dominant Male    HPI:  60 yo M with PMHX of lung cancer(diagnosed 2021) s/p R lobectomy ([facility] 2022) and HLD  p/w migrane and aphasia starting 4:30 pm 10/21/2024  followed by new onset R facial droop  and R sided weakness starting this morning 10/22/2024. Wife states she gave one tablet of baby ASA around 4:30 pm yesterdat for migrane. Patient denies daily use of AC/AP. Denies trauma. NIHSS 7 upon admission. Patient found to have L posterior frontal cortical ICH, transferred to [facility] for further care. Patient on cardene in ED, transferred to NeuroICU for monitoring.     Patient underwent MRI w/wo which showed likely mass but unable to r/o underlying vascular abnormality. He presents today for cerebral angiogram to assess vasculature.     PMHx:   Lung cancer diagnosed in 2021, s/p R Lobectomy- follows with [facility]  HLD: admits noncompliant with medication      Allergies: No Known Allergies      PMH/PSH:  PAST MEDICAL & SURGICAL HISTORY:  Lung cancer    Social History:   Social History:  patient lives with wife in ranch home. Denies hx of smoking, or illiciit drug use. Notes occasional recreational etoh use. (22 Oct 2024 10:26)    FAMILY HISTORY:      Current Medications:   chlorhexidine 2% Cloths 1 Application(s) Topical <User Schedule>  dexAMETHasone  Injectable 4 milliGRAM(s) IV Push every 6 hours  dextrose 50% Injectable 12.5 Gram(s) IV Push once  dextrose 50% Injectable 25 Gram(s) IV Push once  dextrose 50% Injectable 25 Gram(s) IV Push once  hydrALAZINE Injectable 10 milliGRAM(s) IV Push every 2 hours PRN  insulin lispro (ADMELOG) corrective regimen sliding scale   SubCutaneous three times a day before meals  labetalol Injectable 10 milliGRAM(s) IV Push every 2 hours PRN  levETIRAcetam   Injectable 1500 milliGRAM(s) IV Push every 12 hours  polyethylene glycol 3350 17 Gram(s) Oral daily  senna 2 Tablet(s) Oral at bedtime  sodium chloride 0.9%. 1000 milliLiter(s) IV Continuous <Continuous>      Physical Exam:  Constitutional: NAD, lying in bed  Neuro  * Mental Status:  GCS 15: Awake, alert, oriented with choices nodding head, + expressive aphasia,   * Cranial Nerves: PERRL, EOMI, tongue midline, no gaze deviation, + Facial droop   * Motor: RUE 5/5, LUE 5/5, RLE 5/5, LLE 5/5, no drift or dysmetria  * Sensory: Sensation intact to light touch  * Reflexes: not assessed   Cardiovascular: Regular rate and rhythm.  Eyes: See neurologic examination with detailed examination of eyes.  ENT: No JVD, Trachea Midline  Respiratory: non labored breathing   Gastrointestinal: Soft, nontender, nondistended.  Genitourinary: [ ] Tidwell, [ x ] No Tidwell.   Musculoskeletal: No muscle wasting noted, (See neurologic assessment for full muscle strength assessment)   Skin:  no wounds, no redness, no abrasions noted  Hematologic / Lymph / Immunologic: No bleeding from IV sites or wounds    [facility] SS:  DATE: 10/24/24   TIME:  1A: Level of consciousness (0-3): 0  1B: Questions (0-2): 0    1C: Commands (0-2): 0  2: Gaze (0-2): 0  3: Visual fields (0-3): 0  4: Facial palsy (0-3): 1  MOTOR:  5A: Left arm motor drift (0-4): 0  5B: Right arm motor drift (0-4): 0  6A: Left leg motor drift (0-4): 0  6B: Right leg motor drift (0-4): 0  7: Limb ataxia (0-2): 0  SENSORY:  8: Sensation (0-2): 0  SPEECH:  9: Language (0-3): 2  10: Dysarthria (0-2): 0  EXTINCTION:  11: Extinction/inattention (0-2): 0    TOTAL SCORE: 3    Labs:                         13.4   12.77 )-----------( 296      ( 24 Oct 2024 03:30 )             40.0       10-24    138  |  102  |  12.2  ----------------------------<  141[H]  4.8   |  24.0  |  0.75    Ca    9.1      24 Oct 2024 03:30  Phos  2.5     10-24  Mg     2.4     10-24    TPro  7.5  /  Alb  3.9  /  TBili  0.7  /  DBili  x   /  AST  18  /  ALT  9   /  AlkPhos  120  10-22    PT/INR - ( 23 Oct 2024 04:08 )   PT: 12.8 sec;   INR: 1.10 ratio    PTT - ( 23 Oct 2024 04:08 )  PTT:30.1 sec      Assessment/Plan:   This is a 59y  year old  Male  presents with L frontal parietal IPH. Patient presents to neuro-IR for selective cerebral angiography.     Procedure, goals, risks, benefits and alternatives  were discussed with patient and (patient's family).  All questions were answered.  Risks include but are not limited to stroke, vessel injury, hemorrhage, and or groin hematoma.  Patient demonstrates understanding  of all risks involved with this procedure and wishes to continue.   Appropriate  consent was obtained from patient and consent is in the patient's chart. Neurointerventional Surgery  Pre-Procedure Note     This is a 59y ____ hand dominant Male    HPI:  58 yo M with PMHX of lung cancer(diagnosed 2021) s/p R lobectomy ([facility] 2022) and HLD  p/w migrane and aphasia starting 4:30 pm 10/21/2024  followed by new onset R facial droop  and R sided weakness starting this morning 10/22/2024. Wife states she gave one tablet of baby ASA around 4:30 pm yesterdat for migrane. Patient denies daily use of AC/AP. Denies trauma. NIHSS 7 upon admission. Patient found to have L posterior frontal cortical ICH, transferred to [facility] for further care. Patient on cardene in ED, transferred to NeuroICU for monitoring.     Patient underwent MRI w/wo which showed likely mass but unable to r/o underlying vascular abnormality. He presents today for cerebral angiogram to assess vasculature.     PMHx:   Lung cancer diagnosed in 2021, s/p R Lobectomy- follows with [facility]  HLD: admits noncompliant with medication      Allergies: No Known Allergies      PMH/PSH:  PAST MEDICAL & SURGICAL HISTORY:  Lung cancer    Social History:   Social History:  patient lives with wife in ranch home. Denies hx of smoking, or illiciit drug use. Notes occasional recreational etoh use. (22 Oct 2024 10:26)    FAMILY HISTORY:      Current Medications:   chlorhexidine 2% Cloths 1 Application(s) Topical <User Schedule>  dexAMETHasone  Injectable 4 milliGRAM(s) IV Push every 6 hours  dextrose 50% Injectable 12.5 Gram(s) IV Push once  dextrose 50% Injectable 25 Gram(s) IV Push once  dextrose 50% Injectable 25 Gram(s) IV Push once  hydrALAZINE Injectable 10 milliGRAM(s) IV Push every 2 hours PRN  insulin lispro (ADMELOG) corrective regimen sliding scale   SubCutaneous three times a day before meals  labetalol Injectable 10 milliGRAM(s) IV Push every 2 hours PRN  levETIRAcetam   Injectable 1500 milliGRAM(s) IV Push every 12 hours  polyethylene glycol 3350 17 Gram(s) Oral daily  senna 2 Tablet(s) Oral at bedtime  sodium chloride 0.9%. 1000 milliLiter(s) IV Continuous <Continuous>      Physical Exam:  Constitutional: NAD, lying in bed  Neuro  * Mental Status:  Awake, alert, following commands, + expressive aphasia, makes sounds at times  * Cranial Nerves: PERRL, EOMI, tongue midline, no gaze deviation, + Facial droop   * Motor: RUE 5/5, LUE 5/5, RLE 5/5, LLE 5/5, no drift or dysmetria  * Sensory: Sensation intact to light touch  * Reflexes: not assessed   Cardiovascular: Regular rate and rhythm.  Eyes: See neurologic examination with detailed examination of eyes.  ENT: No JVD, Trachea Midline  Respiratory: non labored breathing   Gastrointestinal: Soft, nontender, nondistended.  Genitourinary: [ ] Tidwell, [ x ] No Tidwell.   Musculoskeletal: No muscle wasting noted, (See neurologic assessment for full muscle strength assessment)   Skin:  no wounds, no redness, no abrasions noted  Hematologic / Lymph / Immunologic: No bleeding from IV sites or wounds    [facility] SS:  DATE: 10/24/24   TIME: 07:00  1A: Level of consciousness (0-3): 0  1B: Questions (0-2): 2   1C: Commands (0-2): 0  2: Gaze (0-2): 0  3: Visual fields (0-3): 0  4: Facial palsy (0-3): 2  MOTOR:  5A: Left arm motor drift (0-4): 0  5B: Right arm motor drift (0-4): 0  6A: Left leg motor drift (0-4): 0  6B: Right leg motor drift (0-4): 0  7: Limb ataxia (0-2): 0  SENSORY:  8: Sensation (0-2): 0  SPEECH:  9: Language (0-3): 3  10: Dysarthria (0-2): 0  EXTINCTION:  11: Extinction/inattention (0-2): 0    TOTAL SCORE: 7    Labs:                         13.4   12.77 )-----------( 296      ( 24 Oct 2024 03:30 )             40.0       10-24    138  |  102  |  12.2  ----------------------------<  141[H]  4.8   |  24.0  |  0.75    Ca    9.1      24 Oct 2024 03:30  Phos  2.5     10-24  Mg     2.4     10-24    TPro  7.5  /  Alb  3.9  /  TBili  0.7  /  DBili  x   /  AST  18  /  ALT  9   /  AlkPhos  120  10-22    PT/INR - ( 23 Oct 2024 04:08 )   PT: 12.8 sec;   INR: 1.10 ratio    PTT - ( 23 Oct 2024 04:08 )  PTT:30.1 sec      Assessment/Plan:   This is a 59y  year old  Male presents with L frontal parietal IPH. Patient presents to neuro-IR for selective cerebral angiography.     Procedure, goals, risks, benefits and alternatives  were discussed with patient and patients wife Thelma. All questions were answered.  Risks include but are not limited to stroke, vessel injury, hemorrhage, and or groin hematoma.  Patients wife demonstrates understanding  of all risks involved with this procedure and wishes to continue.   Appropriate  consent was obtained from patient and consent is in the patient's chart.

## 2024-10-24 NOTE — PROGRESS NOTE ADULT - ASSESSMENT
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   Critical care time spent examining patient, reviewing vitals, labs, medications, imaging and discussing with the team goals of care   The combined critical care time provided to the patient was 45 minutes  This time does not include bedside procedures that are documented separately.

## 2024-10-24 NOTE — EEG REPORT - NS EEG TEXT BOX
REJI APONTE [id number]     Study Date: 10-23-24 08:00 to 10-24-24 08:00  Duration: 22 hr 15 min    --------------------------------------------------------------------------------------------------  History:  CC/ HPI Patient is a 59y old  Male who presents with a chief complaint of ICH (24 Oct 2024 08:46)    MEDICATIONS  (STANDING):  chlorhexidine 2% Cloths 1 Application(s) Topical <User Schedule>  dexAMETHasone  Injectable 4 milliGRAM(s) IV Push every 6 hours  dextrose 50% Injectable 25 Gram(s) IV Push once  dextrose 50% Injectable 12.5 Gram(s) IV Push once  dextrose 50% Injectable 25 Gram(s) IV Push once  insulin lispro (ADMELOG) corrective regimen sliding scale   SubCutaneous three times a day before meals  levETIRAcetam   Injectable 1500 milliGRAM(s) IV Push every 12 hours  polyethylene glycol 3350 17 Gram(s) Oral daily  senna 2 Tablet(s) Oral at bedtime  sodium chloride 0.9%. 1000 milliLiter(s) (50 mL/Hr) IV Continuous <Continuous>    --------------------------------------------------------------------------------------------------  Study Interpretation:    [[[Abbreviation Key:  PDR=alpha rhythm/posterior dominant rhythm. A-P=anterior posterior gradient.  Amplitude: ‘very low’:<20; ‘low’:20-50; ‘medium’:; ‘high’:>200uV.  Persistence for periodic/rhythmic patterns (% of epoch) ‘rare’:<1%; ‘occasional’:1-10%; ‘frequent’:10-50%; ‘abundant’:50-90%; ‘continuous’:>90%.  Persistence for sporadic discharges: ‘rare’:<1/hr; ‘occasional’:1/min-1/hr; ‘frequent’:>1/min; ‘abundant’:>1/10 sec.  GRDA=generalized rhythmic delta activity; FIRDA=frontal intermittent GRDA; LRDA=lateralized rhythmic delta activity; TIRDA=temporal intermittent rhythmic delta activity;  LPD=PLED=lateralized periodic discharges; GPD=generalized periodic discharges; BiPDs=BiPLEDs=bilateral independent periodic epileptiform discharges; SIRPID=stimulus induced rhythmic, periodic, or ictal appearing discharges; BIRDs=brief potentially ictal rhythmic discharges >4 Hz, lasting .5-10s; PFA=paroxysmal bursts of beta/gamma; LVFA=low voltage fast activity.  Modifiers: +F=with fast component; +S=with spike component; +R=with rhythmic component.  S-B=burst suppression pattern.  Max=maximal. N1-drowsy; N2-stage II sleep; N3-slow wave sleep. SSS/BETS=small sharp spikes/benign epileptiform transients of sleep. HV=hyperventilation; PS=photic stimulation]]]    FINDINGS:      Background:  Continuity: Continuous  Symmetry: Symmetric  PDR: 9.5 - 10 Hz activity, less well formed in the left hemisphere with amplitude to 40 uV, that attenuated to eye opening.    Reactivity: Present  Voltage: Normal (between 20-150uV)  Anterior Posterior Gradient: Present  Other background findings: None  Breach: Absent    Background Slowing:  Generalized slowing: None was present.  Focal slowing: Continuous left hemispheric polymorphic delta and theta activity.    State Changes:   -Drowsiness noted with increased slowing of the background.  -Present with N2 sleep transients with symmetric spindles and K-complexes.    Sporadic Epileptiform Discharges:    None    Rhythmic and Periodic Patterns (RPPs):  None     Electrographic and Electroclinical seizures:  None    Other Clinical Events:  None    Activation Procedures:   -Hyperventilation was not performed.    -Photic stimulation was not performed.     Artifacts:  Intermittent myogenic and movement artifacts were noted.    ECG:  The heart rate on single channel ECG was predominantly between 70 - 80 BPM.    EEG Classification / Summary:  Abnormal EEG study in the awake / drowsy / asleep states  Left hemispheric focal slowing, continuous    -----------------------------------------------------------------------------------------------------    Clinical Impression:  Abnormal EEG study  Left hemispheric focal cerebral dysfunction, which is likely structural in etiology.  There were no seizures or epileptiform abnormalities recorded.      This is a preliminary impression still pending attendings attestation.     -------------------------------------------------------------------------------------------------------  EEG reading room: [phone]    After-hours epilepsy service: [phone]      Abhi Kelly DO  Epilepsy Fellow   REJI APONTE [id number]     Study Date: 10-23-24 08:00 to 10-24-24 08:00  Duration: 22 hr 15 min    --------------------------------------------------------------------------------------------------  History:  CC/ HPI Patient is a 59y old  Male who presents with a chief complaint of ICH (24 Oct 2024 08:46)    MEDICATIONS  (STANDING):  chlorhexidine 2% Cloths 1 Application(s) Topical <User Schedule>  dexAMETHasone  Injectable 4 milliGRAM(s) IV Push every 6 hours  dextrose 50% Injectable 25 Gram(s) IV Push once  dextrose 50% Injectable 12.5 Gram(s) IV Push once  dextrose 50% Injectable 25 Gram(s) IV Push once  insulin lispro (ADMELOG) corrective regimen sliding scale   SubCutaneous three times a day before meals  levETIRAcetam   Injectable 1500 milliGRAM(s) IV Push every 12 hours  polyethylene glycol 3350 17 Gram(s) Oral daily  senna 2 Tablet(s) Oral at bedtime  sodium chloride 0.9%. 1000 milliLiter(s) (50 mL/Hr) IV Continuous <Continuous>    --------------------------------------------------------------------------------------------------  Study Interpretation:    [[[Abbreviation Key:  PDR=alpha rhythm/posterior dominant rhythm. A-P=anterior posterior gradient.  Amplitude: ‘very low’:<20; ‘low’:20-50; ‘medium’:; ‘high’:>200uV.  Persistence for periodic/rhythmic patterns (% of epoch) ‘rare’:<1%; ‘occasional’:1-10%; ‘frequent’:10-50%; ‘abundant’:50-90%; ‘continuous’:>90%.  Persistence for sporadic discharges: ‘rare’:<1/hr; ‘occasional’:1/min-1/hr; ‘frequent’:>1/min; ‘abundant’:>1/10 sec.  GRDA=generalized rhythmic delta activity; FIRDA=frontal intermittent GRDA; LRDA=lateralized rhythmic delta activity; TIRDA=temporal intermittent rhythmic delta activity;  LPD=PLED=lateralized periodic discharges; GPD=generalized periodic discharges; BiPDs=BiPLEDs=bilateral independent periodic epileptiform discharges; SIRPID=stimulus induced rhythmic, periodic, or ictal appearing discharges; BIRDs=brief potentially ictal rhythmic discharges >4 Hz, lasting .5-10s; PFA=paroxysmal bursts of beta/gamma; LVFA=low voltage fast activity.  Modifiers: +F=with fast component; +S=with spike component; +R=with rhythmic component.  S-B=burst suppression pattern.  Max=maximal. N1-drowsy; N2-stage II sleep; N3-slow wave sleep. SSS/BETS=small sharp spikes/benign epileptiform transients of sleep. HV=hyperventilation; PS=photic stimulation]]]    FINDINGS:      Background:  Continuity: Continuous  Symmetry: Symmetric  PDR: 9.5 - 10 Hz activity, less well formed in the left hemisphere with amplitude to 40 uV, that attenuated to eye opening.    Reactivity: Present  Voltage: Normal (between 20-150uV)  Anterior Posterior Gradient: Present  Other background findings: None  Breach: Absent    Background Slowing:  Generalized slowing: None was present.  Focal slowing: Continuous left hemispheric polymorphic delta and theta activity.    State Changes:   -Drowsiness noted with increased slowing of the background.  -Present with N2 sleep transients with symmetric spindles and K-complexes.    Sporadic Epileptiform Discharges:    -Rare sharp wave in the left anterior temporal region, maximum F7/T7    Rhythmic and Periodic Patterns (RPPs):  None     Electrographic and Electroclinical seizures:  None    Other Clinical Events:  None    Activation Procedures:   -Hyperventilation was not performed.    -Photic stimulation was not performed.     Artifacts:  Intermittent myogenic and movement artifacts were noted.    ECG:  The heart rate on single channel ECG was predominantly between 70 - 80 BPM.    EEG Classification / Summary:  Abnormal EEG study in the awake / drowsy / asleep states  Sharp waves, focal, left anterior temporal region  Left hemispheric focal slowing, continuous    -----------------------------------------------------------------------------------------------------    Clinical Impression:  Abnormal EEG study  There is a risk for focal seizures with onset in the left anterior temporal region.  Left hemispheric focal cerebral dysfunction, which is likely structural in etiology.  There were no seizures recorded.      -------------------------------------------------------------------------------------------------------  EEG reading room: [phone]    After-hours epilepsy service: [phone]      Abhi Kelly DO  Epilepsy Fellow    Burke King MD  Neurology Attending Physician

## 2024-10-24 NOTE — PROGRESS NOTE ADULT - ASSESSMENT
60 yo M who follows at [facility] [place] for a history of lung cancer(diagnosed 2021) s/p  R pneumonectomy and with Dr PETE Jean for a history of PE and irregular mural thrombus of descending AA. Completed 9 months of AC hypercoag work up was negative. Now off anticoagulation at this time.  [place] [facility] [place]/w migrane and aphasia followed by R facial and R sided weakness starting 4:30 PM. 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   - MRI brain MRI BRAIN: 1.  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  The findings are suspicious for an underlying hemorrhagic mass.  Superimposed cerebral infarction or hemorrhagic conversion of an infarct is not excluded.  Mild dural thickening and enhancement overlying the left cerebral convexity may be reactive or represent tumor invasion.2.  There is mild subarachnoid hemorrhage in the left frontal region and left sylvian fissure.3.  There is trace subdural hemorrhage overlying the left renal convexity, measuring 1 mm in caliber.4.  There is trace intraventricular hemorrhage dependently in the occipital horns of both lateral ventricles.  No hydrocephalus.5.  The dural venous sinuses and cavernous sinuses are patent on the postcontrast MPRAGE sequence; however there appears to be a thin linear nonocclusive filling defect in the lateral aspect the left transverse sinus extending to the junction with the left sigmoid sinus, which is suspicious for nonocclusive thrombus.  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  Evaluation of the hypoplastic left transverse sinus, left sigmoid sinus and left jugular bulb is limited by flow artifacts on the noncontrast MR venogram and incomplete imaging on the postcontrast MR venogram.  There is suspected nonocclusive thrombus in the left transverse sinus on the postcontrast MPRAGE images of the brain as discussed above.  Repeat postcontrast MR venogram of the entire brain or dedicated CT venogram may be obtained for further evaluation.The remainder of the dural venous sinuses are patent without suspicious filling defect.    -history of PE and irregular mural thrombus of descending AA.   - Completed 9 months of AC   - hypercoag work up was negative.  - was off anticoagulation at this time.  - dopplers negative for DVT    Lung cancer  - follows at [facility] [place]  - S/P R pneumonectomy  - will get records from [facility] [place]  - pend b/l LE duplex   	  CBC is stable    Will follow and update [facility] [place]            58 yo M who follows  Dr. Sparks and Dr. Guadarrama at [facility] [place] for a history of  hi grade sarcoma of the lung  s/p  R thoracotomy  R pneumonectomy  rescetion of 9.4 cm tumor S/P 4 cycles AIM chemotherapy LD 5/2023 and adjuvant RT to the right lung LD 9/1/23 current scans are [facility] and with Dr PETE Jean for a history of PE and irregular mural thrombus of descending AA. Completed 9 months of AC hypercoag work up was negative. Now off anticoagulation at this time.  transfer from [facility] [place] [place] p/w migrane and aphasia followed by R facial and R sided weakness starting 4:30 PM. 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   - MRI brain MRI BRAIN: 1.  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  The findings are suspicious for an underlying hemorrhagic mass.  Superimposed cerebral infarction or hemorrhagic conversion of an infarct is not excluded.  Mild dural thickening and enhancement overlying the left cerebral convexity may be reactive or represent tumor invasion.2.  There is mild subarachnoid hemorrhage in the left frontal region and left sylvian fissure.3.  There is trace subdural hemorrhage overlying the left renal convexity, measuring 1 mm in caliber.4.  There is trace intraventricular hemorrhage dependently in the occipital horns of both lateral ventricles.  No hydrocephalus.5.  The dural venous sinuses and cavernous sinuses are patent on the postcontrast MPRAGE sequence; however there appears to be a thin linear nonocclusive filling defect in the lateral aspect the left transverse sinus extending to the junction with the left sigmoid sinus, which is suspicious for nonocclusive thrombus.  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  Evaluation of the hypoplastic left transverse sinus, left sigmoid sinus and left jugular bulb is limited by flow artifacts on the noncontrast MR venogram and incomplete imaging on the postcontrast MR venogram.  There is suspected nonocclusive thrombus in the left transverse sinus on the postcontrast MPRAGE images of the brain as discussed above.  Repeat postcontrast MR venogram of the entire brain or dedicated CT venogram may be obtained for further evaluation.The remainder of the dural venous sinuses are patent without suspicious filling defect.  - Neuro following  - EEG Abnormal EEG study There is a risk for focal seizures with onset in the left anterior temporal region.Left hemispheric focal cerebral dysfunction, which is likely structural in etiology.There were no seizures recorded.    - S/P  Cerebral Angiography  Pre- Procedure Diagnosis: Left frontal parietal IPH Post- Procedure Diagnosis: negative cerebral angiogram, no vascular abnormality seen   - CT CAP -*  No evidence of metastatic disease or suspicious adenopathy in the chest, abdomen, or pelvis.*  Right pneumonectomy changes, similar to findings present on prior exams.  - Neurosurgery following  -Keppra 1500 BID and - Dex 4 q 6 hrs    -history of PE and irregular mural thrombus of descending AA.   - Completed 9 months of AC   - hypercoag work up was negative.  - was off anticoagulation at this time.  - dopplers negative for DVT    Right lung hi grade sarcoma   - follows  Dr. Sparks and Dr. Guadarrama at [facility] [place]  -  s/p  R thoracotomy  R pneumonectomy  resection of 9.4 cm tumor   - S/P 4 cycles AIM chemotherapy LD 5/2023   - S/P adjuvant RT to the right lung LD 9/1/23   -  current scans are [facility]  - S/P R pneumonectomy   	  CBC is stable    Will follow and update [facility] [place]

## 2024-10-24 NOTE — PROGRESS NOTE ADULT - ASSESSMENT
ASSESSMENT AND PLAN:   Assessment: 58 yo M with PMHx of lung cancer (2021 ) s/p lobectomy transfer from [facility] [place] p/w migrane and aphasia followed by R facial and R sided weakness starting 4:30 PM. Found to have L posterior frontal cortical ICH    Plan:  - admit to NSICU, q1 neurochecks  - preop for angio today;     - MR brain w/ and w/o shows nonocclusive thrombus in the left transverse sinus  - CT CAP for further mets workup neg  - Keppra 1500 BID   - Dex 4 q 6 hrs   - NPO for angio today     To be discussed in am with Dr. Melendrez,

## 2024-10-24 NOTE — PROGRESS NOTE ADULT - SUBJECTIVE AND OBJECTIVE BOX
58 yo M who follows at [facility] [place] for a history of lung cancer(diagnosed 2021) s/p  R pneumonectomy and with Dr PETE Jean for a history of PE and irregular mural thrombus of descending AA. Completed 9 months of AC hypercoag work up was negative. Now off anticoagulation at this time. presents with aphasia starting 4:30 pm 10/21/2024  followed by new onset R facial droop  and R sided weakness starting this morning 10/22/2024. Wife states she gave one tablet of baby ASA around 4:30 pm yesterdat for migrane. Patient denies daily use of AC/AP. Denies trauma. NIHSS 7 upon admission. Patient found to have L posterior frontal cortical ICH, transfered to [facility] for further care. Patient on cardene in ED, transferred to Neuro ICU for monitoring    PAST MEDICAL & SURGICAL HISTORY:  Lung cancer    Allergies  No Known Allergies    MEDICATIONS  (STANDING):  chlorhexidine 2% Cloths 1 Application(s) Topical <User Schedule>  dexAMETHasone  Injectable 4 milliGRAM(s) IV Push every 6 hours  dextrose 50% Injectable 12.5 Gram(s) IV Push once  dextrose 50% Injectable 25 Gram(s) IV Push once  dextrose 50% Injectable 25 Gram(s) IV Push once  insulin lispro (ADMELOG) corrective regimen sliding scale   SubCutaneous three times a day before meals  levETIRAcetam   Injectable 1500 milliGRAM(s) IV Push every 12 hours  polyethylene glycol 3350 17 Gram(s) Oral daily  senna 2 Tablet(s) Oral at bedtime  sodium chloride 0.9%. 1000 milliLiter(s) (50 mL/Hr) IV Continuous <Continuous>  sodium phosphate 15 milliMole(s)/250 mL IVPB 15 milliMole(s) IV Intermittent once    MEDICATIONS  (PRN):  hydrALAZINE Injectable 10 milliGRAM(s) IV Push every 2 hours PRN SBP>150  labetalol Injectable 10 milliGRAM(s) IV Push every 2 hours PRN SBP>150    Vital Signs Last 24 Hrs  T(C): 36.3 (24 Oct 2024 07:22), Max: 36.7 (24 Oct 2024 00:11)  T(F): 97.4 (24 Oct 2024 07:22), Max: 98.1 (24 Oct 2024 00:11)  HR: 78 (24 Oct 2024 07:22) (74 - 108)  BP: 110/88 (24 Oct 2024 07:22) (110/83 - 146/114)  BP(mean): 95 (24 Oct 2024 06:00) (89 - 122)  RR: 18 (24 Oct 2024 07:22) (14 - 27)  SpO2: 97% (24 Oct 2024 07:22) (95% - 99%)    Parameters below as of 24 Oct 2024 07:22  Patient On (Oxygen Delivery Method): room air        PHYSICAL EXAM:  General: No Acute Distress   Neurological: Awake, alert & oriented following Commands, Severe expressive aphasia.    Cardiovascular:  +S1, +S2  Gastrointestinal: Soft  Nondistended      CBC                        13.4   12.77 )-----------( 296      ( 24 Oct 2024 03:30 )             40.0       CHEM    10-24    138  |  102  |  12.2  ----------------------------<  141[H]  4.8   |  24.0  |  0.75    Ca    9.1      24 Oct 2024 03:30  Phos  2.5     10-24  Mg     2.4     10-24    TPro  7.5  /  Alb  3.9  /  TBili  0.7  /  DBili  x   /  AST  18  /  ALT  9   /  AlkPhos  120  10-22       58 yo M who [place]  Dr. Sparks [place] [facility] [place] a [place]  hi grade sarcoma of the lung  s/p  R thoracotomy  R pneumonectomy  rescetion of 9.4 cm tumor S/P 4 cycles AIM chemotherapy LD 5/2023 and adjuvant RT to the right lung LD 9/1/23 current scans are [facility] and with Dr PETE Jean for a history of PE and irregular mural thrombus of descending AA. Completed 9 months of AC hypercoag work up was negative. Now off anticoagulation at this time. presents with aphasia starting 4:30 pm 10/21/2024  followed by new onset R facial droop  and R sided weakness starting this morning 10/22/2024. Wife states she gave one tablet of baby ASA around 4:30 pm yesterdat for migrane. Patient denies daily use of AC/AP. Denies trauma. NIHSS 7 upon admission. Patient found to have L posterior frontal cortical ICH, transfered to [facility] for further care. Patient on cardene in ED, transferred to Neuro ICU for monitoring    PAST MEDICAL & SURGICAL HISTORY:  Lung cancer    Allergies  No Known Allergies    MEDICATIONS  (STANDING):  chlorhexidine 2% Cloths 1 Application(s) Topical <User Schedule>  dexAMETHasone  Injectable 4 milliGRAM(s) IV Push every 6 hours  dextrose 50% Injectable 12.5 Gram(s) IV Push once  dextrose 50% Injectable 25 Gram(s) IV Push once  dextrose 50% Injectable 25 Gram(s) IV Push once  insulin lispro (ADMELOG) corrective regimen sliding scale   SubCutaneous three times a day before meals  levETIRAcetam   Injectable 1500 milliGRAM(s) IV Push every 12 hours  polyethylene glycol 3350 17 Gram(s) Oral daily  senna 2 Tablet(s) Oral at bedtime  sodium chloride 0.9%. 1000 milliLiter(s) (50 mL/Hr) IV Continuous <Continuous>  sodium phosphate 15 milliMole(s)/250 mL IVPB 15 milliMole(s) IV Intermittent once    MEDICATIONS  (PRN):  hydrALAZINE Injectable 10 milliGRAM(s) IV Push every 2 hours PRN SBP>150  labetalol Injectable 10 milliGRAM(s) IV Push every 2 hours PRN SBP>150    Vital Signs Last 24 Hrs  T(C): 36.3 (24 Oct 2024 07:22), Max: 36.7 (24 Oct 2024 00:11)  T(F): 97.4 (24 Oct 2024 07:22), Max: 98.1 (24 Oct 2024 00:11)  HR: 78 (24 Oct 2024 07:22) (74 - 108)  BP: 110/88 (24 Oct 2024 07:22) (110/83 - 146/114)  BP(mean): 95 (24 Oct 2024 06:00) (89 - 122)  RR: 18 (24 Oct 2024 07:22) (14 - 27)  SpO2: 97% (24 Oct 2024 07:22) (95% - 99%)    Parameters below as of 24 Oct 2024 07:22  Patient On (Oxygen Delivery Method): room air        PHYSICAL EXAM:  General: No Acute Distress   Neurological: Awake, alert & oriented following Commands, Severe expressive aphasia.    Cardiovascular:  +S1, +S2  Gastrointestinal: Soft  Nondistended      CBC                        13.4   12.77 )-----------( 296      ( 24 Oct 2024 03:30 )             40.0       CHEM    10-24    138  |  102  |  12.2  ----------------------------<  141[H]  4.8   |  24.0  |  0.75    Ca    9.1      24 Oct 2024 03:30  Phos  2.5     10-24  Mg     2.4     10-24    TPro  7.5  /  Alb  3.9  /  TBili  0.7  /  DBili  x   /  AST  18  /  ALT  9   /  AlkPhos  120  10-22

## 2024-10-24 NOTE — DIETITIAN INITIAL EVALUATION ADULT - OTHER INFO
Pt is a 58 yo M with PMHX of lung cancer(diagnosed 2021) s/p R lobectomy ([facility] 2022) and HLD  p/w migraine and aphasia starting 4:30 pm 10/21/2024  followed by new onset R facial droop  and R sided weakness starting this morning 10/22/2024. Wife states she gave one tablet of baby ASA around 4:30 pm yesterday for migraine. Patient denies daily use of AC/AP. Denies trauma. NIHSS 7 upon admission. Patient found to have L posterior frontal cortical ICH, transferred to [facility] for further care.   Pt found to have Left frontal parietal IPH, s/p angio

## 2024-10-24 NOTE — PROGRESS NOTE ADULT - SUBJECTIVE AND OBJECTIVE BOX
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, transfered to [facility] for further care    24hr EVENTS:  10/22 - Admitted to NSICU, TTE with severely reduced EF although technically limited study  10/23 - MRI consistent with most likely neoplastic eitiology. EEG without further seizures  10/24 - DSA without vascular anomaly    -----------------------------------------------------------------------------------------------------------------------------------------------------------------------------------    PHYSICAL EXAM:  Neurological:   - Awake, alert & oriented to person, place and time (with choices)   - Following Commands, expressive aphasia  - PERRL, EOMI, Visual fields intact,, + R facial asymmetry.   - Muscle Strength: RUE: 4+/5 + Slight R drift  H/5  LUE: 5/5 RLE: 4+/5 LLE: 5/5  No Drift   - Sensation Intact to Light Touch     CV: RRR  Pulm: clear to auscultation  Abd: Soft, nontender, nondistended  Ext: no noted edema in lower ext  Skin: warm, dry      ------------------------------------------------------------------------------------------------------  ICU Vital Signs Last 24 Hrs  T(C): 36.4 (24 Oct 2024 11:58), Max: 36.7 (24 Oct 2024 00:11)  T(F): 97.5 (24 Oct 2024 11:58), Max: 98.1 (24 Oct 2024 00:11)  HR: 72 (24 Oct 2024 12:00) (72 - 98)  BP: 121/87 (24 Oct 2024 12:00) (110/83 - 141/95)  BP(mean): 98 (24 Oct 2024 12:00) (89 - 115)  ABP: --  ABP(mean): --  RR: 12 (24 Oct 2024 12:00) (12 - 27)  SpO2: 97% (24 Oct 2024 12:00) (95% - 100%)    O2 Parameters below as of 24 Oct 2024 12:00  Patient On (Oxygen Delivery Method): room air            I&O's Summary    23 Oct 2024 07:01  -  24 Oct 2024 07:00  --------------------------------------------------------  IN: 760 mL / OUT: 1250 mL / NET: -490 mL        MEDICATIONS  (STANDING):  chlorhexidine 2% Cloths 1 Application(s) Topical <User Schedule>  dexAMETHasone  Injectable 4 milliGRAM(s) IV Push every 6 hours  dextrose 50% Injectable 25 Gram(s) IV Push once  dextrose 50% Injectable 12.5 Gram(s) IV Push once  dextrose 50% Injectable 25 Gram(s) IV Push once  insulin lispro (ADMELOG) corrective regimen sliding scale   SubCutaneous three times a day before meals  levETIRAcetam   Injectable 1500 milliGRAM(s) IV Push every 12 hours  polyethylene glycol 3350 17 Gram(s) Oral daily  senna 2 Tablet(s) Oral at bedtime  sodium chloride 0.9%. 1000 milliLiter(s) (50 mL/Hr) IV Continuous <Continuous>      RESPIRATORY:      IMAGING:   Recent imaging studies were reviewed.    LAB RESULTS:                          13.4   12.77 )-----------( 296      ( 24 Oct 2024 03:30 )             40.0       PT/INR - ( 23 Oct 2024 04:08 )   PT: 12.8 sec;   INR: 1.10 ratio         PTT - ( 23 Oct 2024 04:08 )  PTT:30.1 sec    10-24    138  |  102  |  12.2  ----------------------------<  141[H]  4.8   |  24.0  |  0.75    Ca    9.1      24 Oct 2024 03:30  Phos  2.5     10-24  Mg     2.4     10-24

## 2024-10-24 NOTE — DIETITIAN INITIAL EVALUATION ADULT - ORAL INTAKE PTA/DIET HISTORY
Spoke to pt and family at bedside. Pt's wife reports pt was eating well at home, denies any recent weight changes. Pt had swallow evaluation; diet advanced to soft and bite size. Pt eating fairly well at this time. Pt's family also bringing in food from home which pt eats and likes.

## 2024-10-24 NOTE — PROGRESS NOTE ADULT - SUBJECTIVE AND OBJECTIVE BOX
· Subjective and Objective:   HPI:  60 yo M with PMHX of lung cancer(diagnosed 2021) s/p R lobectomy ([facility] 2022) and HLD  p/w migrane and aphasia starting 4:30 pm 10/21/2024  followed by new onset R facial droop  and R sided weakness starting this morning 10/22/2024. Wife states she gave one tablet of baby ASA around 4:30 pm yesterdat for migrane. Patient denies daily use of AC/AP. Denies trauma. NIHSS 7 upon admission. Patient found to have L posterior frontal cortical ICH, transfered to [facility] for further care. Patient on cardene in ED, transfered to NeuroICU for monitoring.     NIH Stroke Scale:  - Intubated No  - Sedated/Unresponsive No  - NIH Stroke Scale: LOC (0) Alert; keenly responsive  - NIH Stroke Scale: LOC Question (0) Answers both questions correctly  - NIH Stroke Scale: LOC Command (0) Performs both tasks correctly  - NIH Stroke Scale: Gaze (0) Normal  - NIH Stroke Scale: Visual (0) No visual loss  - NIH Stroke Scale: Facial (3) Complete paralysis of one or both sides (absence  of facial movement in the upper and lower face)  - NIH Stroke Scale: Arm Left (0) No drift; limb holds 90 (or 45) degrees for  full 10 secs  - NIH Stroke Scale: Arm Right (1) Drift; limb holds 90 (or 45) degrees, but  drifts down before full 10 secs; does not hit bed or other support  - NIH Stroke Scale: Leg Left (0) No drift; leg holds 30 degree position for  full 5 secs  - NIH Stroke Scale: Leg Right (0) No drift; leg holds 30 degree position for  full 5 secs  - NIH Stroke Scale: Ataxia (0) Absent  - NIH Stroke Scale: Sensory (0) Normal; no sensory loss  - NIH Stroke Scale: Language (3) Mute, global aphasia; no usable speech or  auditory comprehension  - NIH Stroke Scale: Dysarthria (0) Normal  - NIH Stroke Scale: Extinct Inattention (0) No abnormality  - NIH Stroke Scale: Total 7      10/22: while pt receiving TTE, began having L facial twitch. Loaded with keppra and started on 750 BID  -10/23 Seizure on EEG , 1g Keppra loaded and increased to 1500BID , started on steroids, repeat TTE normal EF        INTERVAL HPI/OVERNIGHT EVENTS: 59y Male seen lying comfortably in bed in no acute distress. tolerating diet.  Voiding with clear urine. CTH stable. Plan for angio today    Vital Signs Last 24 Hrs    T(C): 36.7 (24 Oct 2024 00:11), Max: 36.8 (23 Oct 2024 08:14)  T(F): 98.1 (24 Oct 2024 00:11), Max: 98.3 (23 Oct 2024 08:14)  HR: 74 (24 Oct 2024 04:00) (74 - 108)  BP: 124/92 (24 Oct 2024 04:00) (104/78 - 146/114)  BP(mean): 101 (24 Oct 2024 04:00) (87 - 122)  ABP: --  ABP(mean): --  RR: 16 (24 Oct 2024 04:00) (14 - 27)  SpO2: 96% (24 Oct 2024 04:00) (95% - 99%)    O2 Parameters below as of 24 Oct 2024 04:00  Patient On (Oxygen Delivery Method): room air          PHYSICAL EXAM:  GENERAL: NAD, well-groomed  HEAD:  Atraumatic, normocephalic  MENTAL STATUS: AAO x2; Awake; Opens eyes spontaneously; expressive aphasia; mimicking but not FC  CRANIAL NERVES: PERRL. EOMI intact. R facial.  MOTOR: RUE 4/5, rest is 5/5  SENSATION: grossly intact to light touch all extremities  CHEST/LUNG: Chest rise and fall equally and bilaterally   HEART: +S1/+S2; Regular rate and rhythm   ABDOMEN: Soft, nontender, nondistended   EXTREMITIES:  2+ peripheral pulses, no clubbing, cyanosis, or edema  SKIN: Warm, dry; no visible rashes or lesions    LABS:             .  LABS:                         13.4   12.77 )-----------( 296      ( 24 Oct 2024 03:30 )             40.0     10-24    138  |  102  |  12.2  ----------------------------<  141[H]  4.8   |  24.0  |  0.75    Ca    9.1      24 Oct 2024 03:30  Phos  2.5     10-24  Mg     2.4     10-24    TPro  7.5  /  Alb  3.9  /  TBili  0.7  /  DBili  x   /  AST  18  /  ALT  9   /  AlkPhos  120  10-22    PT/INR - ( 23 Oct 2024 04:08 )   PT: 12.8 sec;   INR: 1.10 ratio         PTT - ( 23 Oct 2024 04:08 )  PTT:30.1 sec  Urinalysis Basic - ( 24 Oct 2024 03:30 )    Color: x / Appearance: x / SG: x / pH: x  Gluc: 141 mg/dL / Ketone: x  / Bili: x / Urobili: x   Blood: x / Protein: x / Nitrite: x   Leuk Esterase: x / RBC: x / WBC x   Sq Epi: x / Non Sq Epi: x / Bacteria: x            RADIOLOGY, EKG & ADDITIONAL TESTS: Reviewed.     < from: CT Head No Cont (10.23.24 @ 14:57) >  IMPRESSION:    No significant interval change from CT brain.    Similar-appearing left lateral frontal parenchymal hemorrhagic lesion   measures 3.2 x 2.6 x 2.5 cm (maximal AP x TV x CC dimensions).    Contiguous 0.9 x 2.3 x 1.7 cm hemorrhage along the superior aspect of the   primary hemorrhagic lesion.    Previously seen new region of high attenuation in the inferior right   parietal lobe is seen to represent artifact on the current examination.    --- End of Report ---    < end of copied text >  < from: CT Head No Cont (10.23.24 @ 08:51) >  IMPRESSION: Small stable parenchymal hemorrhage with adjacent   subarachnoid hemorrhage.    New area of high attenuation is seen involving the inferior right   parietal cortex which could be compatible with a new area of parenchymal   hemorrhage.    --- End of Report -    < end of copied text >    < from: CT Head No Cont (10.22.24 @ 17:20) >  ACC: [id number] EXAM:  CT BRAIN   ORDERED BY: BREEZY ALLEN     PROCEDURE DATE:  10/22/2024          INTERPRETATION:  CLINICAL INFORMATION: Follow-up intracranial hemorrhage.    COMPARISON: MRI brain from 10/22/2024 at 4:56 PM and head CT from   10/22/2024 at 8:03 AM.    CONTRAST:  IV Contrast: NONE  Complications: None reported at time of study completion    TECHNIQUE:  Serial axial images were obtained from the skull base to the   vertex using multi-slice helical technique. Sagittal and coronal   reformats were obtained.    FINDINGS:    VENTRICLES AND SULCI: A partial effacement of the left lateral ventricle.   < from: CT Abdomen and Pelvis w/ IV Cont (10.22.24 @ 17:31) >    IMPRESSION:  *  No evidence of metastatic disease or suspicious adenopathy in the   chest, abdomen, or pelvis.  *  Right pneumonectomy changes, similar to findings present on prior   exams.    < end of copied text >  < from: CT Chest w/ IV Cont (10.22.24 @ 17:31) >  IMPRESSION:  *  No evidence of metastatic disease or suspicious adenopathy in the   chest, abdomen, or pelvis.  *  Right pneumonectomy changes, similar to findings present on prior   exams.    < end of copied text >  < from: CT Head No Cont (10.22.24 @ 17:20) >  IMPRESSION:  Stable exam.  Bilobed hemorrhage versus hemorrhagic mass in the left frontal convexity   is stable.  Subarachnoid hemorrhage in the left frontal convexity and left sylvian   fissure is stable.  Trace subdural hemorrhage overlying left cerebral convexity and trace   intraventricular blood products in the occipital horns of both lateral   ventricles are better visualized on the recently performed MRI..    < end of copied text >   No hydrocephalus.  Trace blood products seen in the occipital horns on   MRI are not visible by CT technique  INTRA-AXIAL: Bilobed hemorrhage versus hemorrhagic mass in the left   frontal convexity is stable.  Mild surrounding edema and mass effect with   approximately 1.5 is midline shift to the right is stable.  EXTRA-AXIAL: Redemonstration of subarachnoid hemorrhage in the left  frontal convexity and left sylvian fissure.  A trace subdural hemorrhage   overlying the left cerebral convexity is better visualized on recently   performed MRI.    VISUALIZED SINUSES:  Scattered mild mucosal thickening.  TYMPANOMASTOID CAVITIES:Small right mastoid effusion.  VISUALIZED ORBITS: Normal.  CALVARIUM: Intact.    MISCELLANEOUS: None.      IMPRESSION:  Stable exam.  Bilobed hemorrhage versus hemorrhagic mass in the left frontal convexity   is stable.  Subarachnoid hemorrhage in the left frontal convexity and left sylvian   fissure is stable.  Trace subdural hemorrhage overlying left cerebral convexity and trace   intraventricular blood products in the occipital horns of both lateral   ventricles are better visualized on the recently performed MRI..      < end of copied text >    < from: MR Brain Stereotactic w/wo IV Cont (10.22.24 @ 16:56) >  ACC: [id number] EXAM:  MR VENOGRAM BRAIN IC   ORDERED BY: MANJIT CASTILLO     ACC: [id number] EXAM:  MR BRAIN WAW IC FOR SRS   ORDERED BY: MARIA FERNANDA SCHERER'ZAY     PROCEDURE DATE:  10/22/2024          INTERPRETATION:  CLINICAL INFORMATION: Left intraparenchymal hemorrhage.    Evaluate filling defect in the left transverse sinus seen on CTA.    COMPARISON: CTA neck/brain from 10/22/2024 at 8:03 AM.    CONTRAST/COMPLICATIONS:  IV Contrast: Gadavist  6 cc administered   1.5 cc discarded  Complications: Nonereported at time of study completion      TECHNIQUE:  1.  MRI Brain was performed using the following sequences: Sagittal T1   FLAIR, axial DWI, axial T1 FLAIR, axial T2 FLAIR, axial SWI, 3-D sagittal   T2 CUBE, axial T2*GRE, axial T2 propeller, postcontrast axial T1 MPRAGE   with sagittal and coronal reformats, postcontrast axial T1 FLAIR  2.  MRI venogram brain was performed using the following sequences:   Noncontrast MR venogram was performed using 3-D phase contrast in the   sagittal plane and 2-D time-of-flight imaging in the coronal plane.    Postcontrast MR venogram was performed in the sagittal plane.  MIP   reformats were performed.    FINDINGS:  MRI BRAIN:  VENTRICLES AND SULCI: Mild partial effacement of the left lateral   ventricle.  Otherwise normal in size and configuration.  No   hydrocephalus.  Trace susceptibility in the occipital horns of both   lateral ventricles is compatible with intraventricular hemorrhage (4:18).  INTRA-AXIAL: Bilobed hemorrhage in the left frontalconvexity.  The more   inferior component measures approximately 3.4 x 2.6 x 2.3 cm (AP x LR x   CC) (3:22-23, 6:23, 4:76-77, and 2:22-23).  The more superior component   measures approximately 2.2 x 2.5 x 2 cm and demonstrates a fluid/fluid   level (3:26, 6:26, 4:83 and 2:22).  There is patchy peripheral diffusion   restriction and patchy peripheral nodular enhancement, some of which may   be leptomeningeal; enhancement extends to the dura..  The findings are   suspicious for underlying hemorrhagic mass.  Superimposed infarct or   hemorrhagic conversion of an infarct is not excluded. There is mild   surrounding vasogenic edema and mild regional mass effect, with   approximately 1.5 mm of midline shift to the right.  A few scattered   punctate foci of T2 FLAIR signal hyperintensity in the deep/subcortical   white matter are nonspecific in etiology.  EXTRA-AXIAL: There is redemonstration of mild subarachnoid hemorrhage in   the left frontal convexity and left sylvian fissure.  A trace subdural   hemorrhage overlying the left the ventral aspect of the left   frontoparietal convexity and left temporal lobe measures approximately 1   mm in caliber (3:17).  There is mild dural thickening and enhancement   overlying the left cerebral laxity, measuring up to 2.5 mm (5b:29).    There is possible leptomeningeal enhancement surrounding the hemorrhage..    SINUSES:  Scattered mild mucosal thickening.  MASTOIDS:  Small mastoid effusions bilaterally, right greater than left.  ORBITS: Normal.  CALVARIUM: Intact.    MISCELLANEOUS: The dural venous sinuses and cavernous sinuses are patent   on the postcontrast MPRAGE sequence; however there appears to be a thin   linear nonocclusive filling defect in the lateral aspect of the left   transverse sinus extending to the junction with the left sigmoid sinus   (500:160-168).    MRI VENOGRAM BRAIN:  TECHNICAL LIMITATIONS: There are flow artifacts on the noncontrast MR   venogram images.  Postcontrast MR venogram was only obtained in the   midline and parasagittal regions.  The lateral aspects of the transverse   sinuses, sigmoid sinuses and the jugular bulbs are excluded from the   field-of-view on postcontrast MR venogram.  SUPERIOR SAGITTAL SINUS: Patent.  RIGHT TRANSVERSE AND SIGMOID SINUS: Patent.  LEFT TRANSVERSE AND SIGMOID SINUS: Congenitally hypoplastic.  Limited   evaluation.  INTERNAL JUGULAR VEINS: The right internal jugular bulb is patent.    Limited evaluation of a congenitally hypoplastic left jugular bulb.  INTERNAL CEREBRAL VEINS: Patent bilaterally.  VEIN OF LINDA: Patent.  STRAIGHT SINUS: Patent.    MISCELLANEOUS: None.    IMPRESSION:  MRI BRAIN:  1.  A bilobed hemorrhage in the left frontal convexity demonstrates   patchy peripheral diffusion restriction and demonstrates patchy   peripheral nodular enhancement, some of which may be leptomeningeal, and   which extends to the dura.  The findings are suspicious for an underlying   hemorrhagic mass.  Superimposed cerebral infarction or hemorrhagic   conversion of an infarct is not excluded.  Mild dural thickening and   enhancement overlying the left cerebral convexity may be reactive or   represent tumor invasion.  2.  There is mild subarachnoid hemorrhage in the left frontal region and   left sylvian fissure.  3.  There is trace subdural hemorrhage overlying the left renal   convexity, measuring 1 mm in caliber.  4.  There is trace intraventricular hemorrhage dependently in the   occipital horns of both lateral ventricles.  No hydrocephalus.  5.  The duralvenous sinuses and cavernous sinuses are patent on the   postcontrast MPRAGE sequence; however there appears to be a thin linear   nonocclusive filling defect in the lateral aspect the left transverse   sinus extending to the junction with the left sigmoid sinus, which is   suspicious for nonocclusive thrombus.    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  Evaluation of the hypoplastic left transverse   sinus, left sigmoid sinus and left jugular bulb is limited by flow   artifacts on the noncontrast MR venogram and incomplete imaging on the   postcontrast MR venogram.  There is suspected nonocclusive thrombus in   the left transverse sinus on the postcontrast MPRAGEimages of the brain   as discussed above.  Repeat postcontrast MR venogram of the entire brain   or dedicated CT venogram may be obtained for further evaluation.    The remainder of the dural venous sinuses are patent without suspicious   filling defect.    < end of copied text >       Most Recent Beta Hcg (Optional): negative

## 2024-10-24 NOTE — DIETITIAN INITIAL EVALUATION ADULT - PERTINENT MEDS FT
MEDICATIONS  (STANDING):  chlorhexidine 2% Cloths 1 Application(s) Topical <User Schedule>  dexAMETHasone  Injectable 4 milliGRAM(s) IV Push every 6 hours  dextrose 50% Injectable 12.5 Gram(s) IV Push once  insulin lispro (ADMELOG) corrective regimen sliding scale   SubCutaneous three times a day before meals  levETIRAcetam   Injectable 1500 milliGRAM(s) IV Push every 12 hours  polyethylene glycol 3350 17 Gram(s) Oral daily  senna 2 Tablet(s) Oral at bedtime  sodium chloride 0.9%. 1000 milliLiter(s) (50 mL/Hr) IV Continuous <Continuous>    MEDICATIONS  (PRN):  hydrALAZINE Injectable 10 milliGRAM(s) IV Push every 2 hours PRN SBP>150  labetalol Injectable 10 milliGRAM(s) IV Push every 2 hours PRN SBP>150

## 2024-10-25 LAB
ANION GAP SERPL CALC-SCNC: 11 MMOL/L — SIGNIFICANT CHANGE UP (ref 5–17)
BUN SERPL-MCNC: 12.4 MG/DL — SIGNIFICANT CHANGE UP (ref 8–20)
CALCIUM SERPL-MCNC: 8.8 MG/DL — SIGNIFICANT CHANGE UP (ref 8.4–10.5)
CHLORIDE SERPL-SCNC: 103 MMOL/L — SIGNIFICANT CHANGE UP (ref 96–108)
CO2 SERPL-SCNC: 25 MMOL/L — SIGNIFICANT CHANGE UP (ref 22–29)
CREAT SERPL-MCNC: 0.74 MG/DL — SIGNIFICANT CHANGE UP (ref 0.5–1.3)
EGFR: 104 ML/MIN/1.73M2 — SIGNIFICANT CHANGE UP
GAS PNL BLDA: SIGNIFICANT CHANGE UP
GLUCOSE BLDC GLUCOMTR-MCNC: 113 MG/DL — HIGH (ref 70–99)
GLUCOSE BLDC GLUCOMTR-MCNC: 151 MG/DL — HIGH (ref 70–99)
GLUCOSE SERPL-MCNC: 118 MG/DL — HIGH (ref 70–99)
HCT VFR BLD CALC: 35.1 % — LOW (ref 39–50)
HGB BLD-MCNC: 11.7 G/DL — LOW (ref 13–17)
MAGNESIUM SERPL-MCNC: 1.9 MG/DL — SIGNIFICANT CHANGE UP (ref 1.8–2.6)
MCHC RBC-ENTMCNC: 28.7 PG — SIGNIFICANT CHANGE UP (ref 27–34)
MCHC RBC-ENTMCNC: 33.3 GM/DL — SIGNIFICANT CHANGE UP (ref 32–36)
MCV RBC AUTO: 86 FL — SIGNIFICANT CHANGE UP (ref 80–100)
PHOSPHATE SERPL-MCNC: 3.4 MG/DL — SIGNIFICANT CHANGE UP (ref 2.4–4.7)
PLATELET # BLD AUTO: 284 K/UL — SIGNIFICANT CHANGE UP (ref 150–400)
POTASSIUM SERPL-MCNC: 4.3 MMOL/L — SIGNIFICANT CHANGE UP (ref 3.5–5.3)
POTASSIUM SERPL-SCNC: 4.3 MMOL/L — SIGNIFICANT CHANGE UP (ref 3.5–5.3)
RBC # BLD: 4.08 M/UL — LOW (ref 4.2–5.8)
RBC # FLD: 14.2 % — SIGNIFICANT CHANGE UP (ref 10.3–14.5)
SODIUM SERPL-SCNC: 138 MMOL/L — SIGNIFICANT CHANGE UP (ref 135–145)
WBC # BLD: 21.39 K/UL — HIGH (ref 3.8–10.5)
WBC # FLD AUTO: 21.39 K/UL — HIGH (ref 3.8–10.5)

## 2024-10-25 PROCEDURE — 99292 CRITICAL CARE ADDL 30 MIN: CPT

## 2024-10-25 PROCEDURE — 99291 CRITICAL CARE FIRST HOUR: CPT

## 2024-10-25 DEVICE — SURGICEL FIBRILLAR 4 X 4": Type: IMPLANTABLE DEVICE | Site: LEFT | Status: FUNCTIONAL

## 2024-10-25 DEVICE — SURGICEL 2 X 14": Type: IMPLANTABLE DEVICE | Site: LEFT | Status: FUNCTIONAL

## 2024-10-25 DEVICE — SURGIFLO MATRIX WITH THROMBIN KIT: Type: IMPLANTABLE DEVICE | Site: LEFT | Status: FUNCTIONAL

## 2024-10-25 DEVICE — PLATE COVER BURRHOLE UN3 W/TAB 10MM: Type: IMPLANTABLE DEVICE | Site: LEFT | Status: FUNCTIONAL

## 2024-10-25 DEVICE — MAYFIELD SKULL PIN ADULT STEEL: Type: IMPLANTABLE DEVICE | Site: LEFT | Status: FUNCTIONAL

## 2024-10-25 DEVICE — KIT A-LINE 1LUM 20G X 12CM SAFE KIT: Type: IMPLANTABLE DEVICE | Site: LEFT | Status: FUNCTIONAL

## 2024-10-25 DEVICE — PLATE LO PROF 8 HOLE: Type: IMPLANTABLE DEVICE | Site: LEFT | Status: FUNCTIONAL

## 2024-10-25 DEVICE — SURGIFOAM PAD 8CM X 12.5CM X 10MM (100): Type: IMPLANTABLE DEVICE | Site: LEFT | Status: FUNCTIONAL

## 2024-10-25 DEVICE — MATRIX DURAGEN PLUS DURAL REGENERATION 4X5IN: Type: IMPLANTABLE DEVICE | Site: LEFT | Status: FUNCTIONAL

## 2024-10-25 DEVICE — SCREW UN3 AXS SELF DRILL 1.5X4MM: Type: IMPLANTABLE DEVICE | Site: LEFT | Status: FUNCTIONAL

## 2024-10-25 RX ORDER — CEFAZOLIN SODIUM 1 G
2000 VIAL (EA) INJECTION EVERY 8 HOURS
Refills: 0 | Status: COMPLETED | OUTPATIENT
Start: 2024-10-25 | End: 2024-10-26

## 2024-10-25 RX ORDER — LABETALOL HCL 200 MG
10 TABLET ORAL
Refills: 0 | Status: DISCONTINUED | OUTPATIENT
Start: 2024-10-25 | End: 2024-10-28

## 2024-10-25 RX ORDER — OXYCODONE HYDROCHLORIDE 30 MG/1
5 TABLET ORAL EVERY 4 HOURS
Refills: 0 | Status: DISCONTINUED | OUTPATIENT
Start: 2024-10-25 | End: 2024-10-30

## 2024-10-25 RX ORDER — PANTOPRAZOLE SODIUM 40 MG/1
40 TABLET, DELAYED RELEASE ORAL DAILY
Refills: 0 | Status: DISCONTINUED | OUTPATIENT
Start: 2024-10-25 | End: 2024-10-30

## 2024-10-25 RX ORDER — ONDANSETRON HYDROCHLORIDE 2 MG/ML
4 INJECTION, SOLUTION INTRAMUSCULAR; INTRAVENOUS EVERY 6 HOURS
Refills: 0 | Status: DISCONTINUED | OUTPATIENT
Start: 2024-10-25 | End: 2024-10-30

## 2024-10-25 RX ORDER — HYDRALAZINE HYDROCHLORIDE 50 MG/1
10 TABLET, FILM COATED ORAL
Refills: 0 | Status: DISCONTINUED | OUTPATIENT
Start: 2024-10-25 | End: 2024-10-28

## 2024-10-25 RX ORDER — CHLORHEXIDINE GLUCONATE 40 MG/ML
1 SOLUTION TOPICAL ONCE
Refills: 0 | Status: COMPLETED | OUTPATIENT
Start: 2024-10-25 | End: 2024-10-25

## 2024-10-25 RX ORDER — OXYCODONE HYDROCHLORIDE 30 MG/1
10 TABLET ORAL EVERY 4 HOURS
Refills: 0 | Status: DISCONTINUED | OUTPATIENT
Start: 2024-10-25 | End: 2024-10-30

## 2024-10-25 RX ORDER — CEFAZOLIN SODIUM 1 G
2000 VIAL (EA) INJECTION EVERY 8 HOURS
Refills: 0 | Status: DISCONTINUED | OUTPATIENT
Start: 2024-10-25 | End: 2024-10-25

## 2024-10-25 RX ORDER — POVIDONE-IODINE 0.07 MG/ML
1 SOLUTION TOPICAL ONCE
Refills: 0 | Status: COMPLETED | OUTPATIENT
Start: 2024-10-25 | End: 2024-10-25

## 2024-10-25 RX ORDER — ACETAMINOPHEN 500 MG
650 TABLET ORAL EVERY 6 HOURS
Refills: 0 | Status: DISCONTINUED | OUTPATIENT
Start: 2024-10-25 | End: 2024-10-30

## 2024-10-25 RX ADMIN — SODIUM CHLORIDE 50 MILLILITER(S): 9 INJECTION, SOLUTION INTRAMUSCULAR; INTRAVENOUS; SUBCUTANEOUS at 23:16

## 2024-10-25 RX ADMIN — DEXAMETHASONE 1.5 MG 4 MILLIGRAM(S): 1.5 TABLET ORAL at 11:49

## 2024-10-25 RX ADMIN — DEXAMETHASONE 1.5 MG 4 MILLIGRAM(S): 1.5 TABLET ORAL at 05:00

## 2024-10-25 RX ADMIN — Medication 2: at 16:58

## 2024-10-25 RX ADMIN — CHLORHEXIDINE GLUCONATE 1 APPLICATION(S): 40 SOLUTION TOPICAL at 11:03

## 2024-10-25 RX ADMIN — LEVETIRACETAM 1500 MILLIGRAM(S): 500 TABLET, FILM COATED ORAL at 11:49

## 2024-10-25 RX ADMIN — DEXAMETHASONE 1.5 MG 4 MILLIGRAM(S): 1.5 TABLET ORAL at 17:45

## 2024-10-25 RX ADMIN — SODIUM CHLORIDE 50 MILLILITER(S): 9 INJECTION, SOLUTION INTRAMUSCULAR; INTRAVENOUS; SUBCUTANEOUS at 17:45

## 2024-10-25 RX ADMIN — Medication 2000 MILLIGRAM(S): at 22:43

## 2024-10-25 RX ADMIN — LEVETIRACETAM 1500 MILLIGRAM(S): 500 TABLET, FILM COATED ORAL at 23:14

## 2024-10-25 RX ADMIN — DEXAMETHASONE 1.5 MG 4 MILLIGRAM(S): 1.5 TABLET ORAL at 23:14

## 2024-10-25 RX ADMIN — Medication 2 TABLET(S): at 22:43

## 2024-10-25 RX ADMIN — CHLORHEXIDINE GLUCONATE 1 APPLICATION(S): 40 SOLUTION TOPICAL at 05:00

## 2024-10-25 NOTE — EEG REPORT - NS EEG TEXT BOX
REJI APONTE [id number]     Study Date: 10-24-24 08:00 to 10-24-24 15:41  Duration: 7 hr 40 min    --------------------------------------------------------------------------------------------------  History:  CC/ HPI Patient is a 59y old  Male who presents with a chief complaint of ICH (25 Oct 2024 12:02)    MEDICATIONS  (STANDING):  chlorhexidine 2% Cloths 1 Application(s) Topical <User Schedule>  dexAMETHasone  Injectable 4 milliGRAM(s) IV Push every 6 hours  dextrose 50% Injectable 12.5 Gram(s) IV Push once  dextrose 50% Injectable 25 Gram(s) IV Push once  dextrose 50% Injectable 25 Gram(s) IV Push once  insulin lispro (ADMELOG) corrective regimen sliding scale   SubCutaneous three times a day before meals  levETIRAcetam   Injectable 1500 milliGRAM(s) IV Push every 12 hours  pantoprazole    Tablet 40 milliGRAM(s) Oral daily  polyethylene glycol 3350 17 Gram(s) Oral daily  povidone iodine 10% Nasal Swab 1 Application(s) Both Nostrils once  senna 2 Tablet(s) Oral at bedtime  sodium chloride 0.9%. 1000 milliLiter(s) (50 mL/Hr) IV Continuous <Continuous>    --------------------------------------------------------------------------------------------------  Study Interpretation:    [[[Abbreviation Key:  PDR=alpha rhythm/posterior dominant rhythm. A-P=anterior posterior gradient.  Amplitude: ‘very low’:<20; ‘low’:20-50; ‘medium’:; ‘high’:>200uV.  Persistence for periodic/rhythmic patterns (% of epoch) ‘rare’:<1%; ‘occasional’:1-10%; ‘frequent’:10-50%; ‘abundant’:50-90%; ‘continuous’:>90%.  Persistence for sporadic discharges: ‘rare’:<1/hr; ‘occasional’:1/min-1/hr; ‘frequent’:>1/min; ‘abundant’:>1/10 sec.  GRDA=generalized rhythmic delta activity; FIRDA=frontal intermittent GRDA; LRDA=lateralized rhythmic delta activity; TIRDA=temporal intermittent rhythmic delta activity;  LPD=PLED=lateralized periodic discharges; GPD=generalized periodic discharges; BiPDs=BiPLEDs=bilateral independent periodic epileptiform discharges; SIRPID=stimulus induced rhythmic, periodic, or ictal appearing discharges; BIRDs=brief potentially ictal rhythmic discharges >4 Hz, lasting .5-10s; PFA=paroxysmal bursts of beta/gamma; LVFA=low voltage fast activity.  Modifiers: +F=with fast component; +S=with spike component; +R=with rhythmic component.  S-B=burst suppression pattern.  Max=maximal. N1-drowsy; N2-stage II sleep; N3-slow wave sleep. SSS/BETS=small sharp spikes/benign epileptiform transients of sleep. HV=hyperventilation; PS=photic stimulation]]]    FINDINGS:      Background:  Continuity: Continuous  Symmetry: Symmetric  PDR: 9.5 Hz activity, less well formed in the left hemisphere with amplitude to 40 uV, that attenuated to eye opening.    Reactivity: Present  Voltage: Normal (between 20-150uV)  Anterior Posterior Gradient: Present  Other background findings: None  Breach: Absent    Background Slowing:  Generalized slowing: None was present.  Focal slowing: Continuous left hemispheric polymorphic delta and theta activity.    State Changes:   -Drowsiness noted with increased slowing of the background.  -N2 sleep transients were not recorded.    Sporadic Epileptiform Discharges:    Rare sharp waves in the left anterior temporal region, maximum F7/T7    Rhythmic and Periodic Patterns (RPPs):  None     Electrographic and Electroclinical seizures:  None    Other Clinical Events:  None    Activation Procedures:   -Hyperventilation was not performed.    -Photic stimulation was not performed.     Artifacts:  Intermittent myogenic and movement artifacts were noted.    ECG:  The heart rate on single channel ECG was predominantly between xx BPM.    EEG Classification / Summary:  Abnormal EEG study in the awake / drowsy states  Sharp waves, focal, left anterior temporal region  Left hemispheric focal slowing, continuous    -----------------------------------------------------------------------------------------------------    Clinical Impression:  Abnormal EEG study  Risk of focal onset seizures from left anterior temporal region.  Left hemispheric focal cerebral dysfunction, likely structural in etiology.  There were no seizures recorded during the study.      This is a preliminary impression still pending attendings attestation.     -------------------------------------------------------------------------------------------------------  EEG reading room: [phone]    After-hours epilepsy service: [phone]      Abhi Kelly DO  Epilepsy Fellow   REJI APONTE [id number]     Study Date: 10-24-24 08:00 to 10-24-24 15:41  Duration: 7 hr 40 min    --------------------------------------------------------------------------------------------------  History:  CC/ HPI Patient is a 59y old  Male who presents with a chief complaint of ICH (25 Oct 2024 12:02)    MEDICATIONS  (STANDING):  chlorhexidine 2% Cloths 1 Application(s) Topical <User Schedule>  dexAMETHasone  Injectable 4 milliGRAM(s) IV Push every 6 hours  dextrose 50% Injectable 12.5 Gram(s) IV Push once  dextrose 50% Injectable 25 Gram(s) IV Push once  dextrose 50% Injectable 25 Gram(s) IV Push once  insulin lispro (ADMELOG) corrective regimen sliding scale   SubCutaneous three times a day before meals  levETIRAcetam   Injectable 1500 milliGRAM(s) IV Push every 12 hours  pantoprazole    Tablet 40 milliGRAM(s) Oral daily  polyethylene glycol 3350 17 Gram(s) Oral daily  povidone iodine 10% Nasal Swab 1 Application(s) Both Nostrils once  senna 2 Tablet(s) Oral at bedtime  sodium chloride 0.9%. 1000 milliLiter(s) (50 mL/Hr) IV Continuous <Continuous>    --------------------------------------------------------------------------------------------------  Study Interpretation:    [[[Abbreviation Key:  PDR=alpha rhythm/posterior dominant rhythm. A-P=anterior posterior gradient.  Amplitude: ‘very low’:<20; ‘low’:20-50; ‘medium’:; ‘high’:>200uV.  Persistence for periodic/rhythmic patterns (% of epoch) ‘rare’:<1%; ‘occasional’:1-10%; ‘frequent’:10-50%; ‘abundant’:50-90%; ‘continuous’:>90%.  Persistence for sporadic discharges: ‘rare’:<1/hr; ‘occasional’:1/min-1/hr; ‘frequent’:>1/min; ‘abundant’:>1/10 sec.  GRDA=generalized rhythmic delta activity; FIRDA=frontal intermittent GRDA; LRDA=lateralized rhythmic delta activity; TIRDA=temporal intermittent rhythmic delta activity;  LPD=PLED=lateralized periodic discharges; GPD=generalized periodic discharges; BiPDs=BiPLEDs=bilateral independent periodic epileptiform discharges; SIRPID=stimulus induced rhythmic, periodic, or ictal appearing discharges; BIRDs=brief potentially ictal rhythmic discharges >4 Hz, lasting .5-10s; PFA=paroxysmal bursts of beta/gamma; LVFA=low voltage fast activity.  Modifiers: +F=with fast component; +S=with spike component; +R=with rhythmic component.  S-B=burst suppression pattern.  Max=maximal. N1-drowsy; N2-stage II sleep; N3-slow wave sleep. SSS/BETS=small sharp spikes/benign epileptiform transients of sleep. HV=hyperventilation; PS=photic stimulation]]]    FINDINGS:      Background:  Continuity: Continuous  Symmetry: Symmetric  PDR: 9.5 Hz activity, less well formed in the left hemisphere with amplitude to 40 uV, that attenuated to eye opening.    Reactivity: Present  Voltage: Normal (between 20-150uV)  Anterior Posterior Gradient: Present  Other background findings: None  Breach: Absent    Background Slowing:  Generalized slowing: None was present.  Focal slowing: Continuous left hemispheric polymorphic delta and theta activity.    State Changes:   -Drowsiness noted with increased slowing of the background.  -N2 sleep transients were not recorded.    Sporadic Epileptiform Discharges:    None    Rhythmic and Periodic Patterns (RPPs):  None     Electrographic and Electroclinical seizures:  None    Other Clinical Events:  None    Activation Procedures:   -Hyperventilation was not performed.    -Photic stimulation was not performed.     Artifacts:  Intermittent myogenic and movement artifacts were noted.    ECG:  The heart rate on single channel ECG was predominantly between xx BPM.    EEG Classification / Summary:  Abnormal EEG study in the awake / drowsy states  Left hemispheric focal slowing, continuous    -----------------------------------------------------------------------------------------------------    Clinical Impression:  Abnormal EEG study  Left hemispheric focal cerebral dysfunction, likely structural in etiology.  There were no seizures recorded during the study.      -------------------------------------------------------------------------------------------------------  EEG reading room: [phone]    After-hours epilepsy service: [phone]      Abhi Kelly DO  Epilepsy Fellow    Burke King MD  Neurology Attending Physician

## 2024-10-25 NOTE — PROGRESS NOTE ADULT - SUBJECTIVE AND OBJECTIVE BOX
SUBJECTIVE:   58 yo M with PMHX of lung cancer(diagnosed 2021) s/p R lobectomy ([facility] 2022) and HLD  p/w migrane and aphasia starting 4:30 pm 10/21/2024  followed by new onset R facial droop  and R sided weakness starting this morning 10/22/2024. Wife states she gave one tablet of baby ASA around 4:30 pm yesterday for migrane. Patient denies daily use of AC/AP. Denies trauma. NIHSS 7 upon admission. Patient found to have L posterior frontal cortical ICH, transfered to [facility] for further care.  -MRI brain w/wo showed bilobed hemorrhage w/ patchy   peripheral nodular enhancement, some of which may be leptomeningeal, and which extends to the dura concerning for underlying mass.      INTERVAL HX/OVERNIGHT EVENTS:  Patient seen and examined at bedside. Plan for OR today for L craniotomy for likely tumor resection. Angio yesterday was negative for vascular abnormalities.     Vital Signs Last 24 Hrs  T(C): 36.8 (10-25-24 @ 11:33), Max: 36.8 (10-24-24 @ 16:05)  T(F): 98.3 (10-25-24 @ 11:33), Max: 98.3 (10-24-24 @ 16:05)  HR: 71 (10-25-24 @ 09:00) (68 - 90)  BP: 117/87 (10-25-24 @ 09:00) (102/73 - 146/80)  BP(mean): 96 (10-25-24 @ 09:00) (82 - 109)  RR: 19 (10-25-24 @ 09:00) (10 - 27)  SpO2: 95% (10-25-24 @ 09:00) (95% - 100%)    PHYSICAL EXAM:    GENERAL: NAD    HEAD: normocephalic   MENTAL STATUS: AAO x 3 with choices (correct year, wrong month), severe expressive aphasia but nods appropriately.  following simple commands    CRANIAL NERVES: PERRL, EOMI without nystagmus. R facial, Tongue deviated slight towards right. Hearing grossly intact. Head turning and shoulder shrug intact.    REFLEXES: No pronator drift   MOTOR: strength 5/5 LUE/BLE, 4+/5 RUE   SENSATION: grossly intact to light touch all extremities   SKIN: Warm, dry    LABS:                          11.7   21.39 )-----------( 284      ( 25 Oct 2024 03:00 )             35.1    10-25    138  |  103  |  12.4  ----------------------------<  118[H]  4.3   |  25.0  |  0.74    Ca    8.8      25 Oct 2024 03:00  Phos  3.4     10-25  Mg     1.9     10-25        10-24 @ 07:01  -  10-25 @ 07:00  --------------------------------------------------------  IN: 1500 mL / OUT: 1400 mL / NET: 100 mL    10-25 @ 07:01  -  10-25 @ 12:04  --------------------------------------------------------  IN: 50 mL / OUT: 0 mL / NET: 50 mL        IMAGING:     MR Brain Stereotactic w/wo IV Cont (10.22.24 @ 16:56)   IMPRESSION:  MRI BRAIN:  1.  A bilobed hemorrhage in the left frontal convexity demonstrates   patchy peripheral diffusion restriction and demonstrates patchy   peripheral nodular enhancement, some of which may be leptomeningeal, and   which extends to the dura.  The findings are suspicious for an underlying   hemorrhagic mass.  Superimposed cerebral infarction or hemorrhagic   conversion of an infarct is not excluded.  Mild dural thickening and   enhancement overlying the left cerebral convexity may be reactive or   represent tumor invasion.  2.  There is mild subarachnoid hemorrhage in the left frontal region and   left sylvian fissure.  3.  There is trace subdural hemorrhage overlying the left renal   convexity, measuring 1 mm in caliber.  4.  There is trace intraventricular hemorrhage dependently in the   occipital horns of both lateral ventricles.  No hydrocephalus.  5.  The duralvenous sinuses and cavernous sinuses are patent on the   postcontrast MPRAGE sequence; however there appears to be a thin linear   nonocclusive filling defect in the lateral aspect the left transverse   sinus extending to the junction with the left sigmoid sinus, which is   suspicious for nonocclusive thrombus.    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  Evaluation of the hypoplastic left transverse   sinus, left sigmoid sinus and left jugular bulb is limited by flow   artifacts on the noncontrast MR venogram and incomplete imaging on the   postcontrast MR venogram.  There is suspected nonocclusive thrombus in   the left transverse sinus on the postcontrast MPRAGEimages of the brain   as discussed above.  Repeat postcontrast MR venogram of the entire brain   or dedicated CT venogram may be obtained for further evaluation.    The remainder of the dural venous sinuses are patent without suspicious   filling defect.

## 2024-10-25 NOTE — PROGRESS NOTE ADULT - ASSESSMENT
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   Critical care time spent examining patient, reviewing vitals, labs, medications, imaging and discussing with the team goals of care   The combined critical care time provided to the patient was 45 minutes  This time does not include bedside procedures that are documented separately.

## 2024-10-25 NOTE — BRIEF OPERATIVE NOTE - NSICDXBRIEFPROCEDURE_GEN_ALL_CORE_FT
PROCEDURES:  Craniotomy, for lesion excision 25-Oct-2024 15:41:31 L craniotomy for L frontal brain lesion resection (possible AVM resection) Meredith Chapman

## 2024-10-25 NOTE — PROGRESS NOTE ADULT - SUBJECTIVE AND OBJECTIVE BOX
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, transfered to [facility] for further care    24hr EVENTS:  10/22 - Admitted to NSICU, TTE with severely reduced EF although technically limited study  10/23 - MRI consistent with most likely neoplastic eitiology. EEG without further seizures  10/24 - DSA without vascular anomaly  10/25 - OR for tumor resection    -----------------------------------------------------------------------------------------------------------------------------------------------------------------------------------    PHYSICAL EXAM:  Neurological:   - Awake, alert & oriented to person, place and time (with choices)   - Following Commands, expressive aphasia  - PERRL, EOMI, Visual fields intact,, + R facial asymmetry.   - Muscle Strength: RUE: 4+/5 + Slight R drift  H/5  LUE: 5/5 RLE: 4+/5 LLE: 5/5  No Drift   - Sensation Intact to Light Touch     CV: RRR  Pulm: clear to auscultation  Abd: Soft, nontender, nondistended  Ext: no noted edema in lower ext  Skin: warm, dry    ------------------------------------------------------------------------------------------------------  ICU Vital Signs Last 24 Hrs  T(C): 36.8 (25 Oct 2024 11:33), Max: 36.8 (24 Oct 2024 16:05)  T(F): 98.3 (25 Oct 2024 11:33), Max: 98.3 (24 Oct 2024 16:05)  HR: 79 (25 Oct 2024 12:00) (68 - 90)  BP: 134/94 (25 Oct 2024 12:00) (102/73 - 146/80)  BP(mean): 107 (25 Oct 2024 12:00) (82 - 109)  ABP: --  ABP(mean): --  RR: 20 (25 Oct 2024 12:00) (10 - 22)  SpO2: 99% (25 Oct 2024 12:00) (95% - 100%)    O2 Parameters below as of 25 Oct 2024 12:00  Patient On (Oxygen Delivery Method): room air            I&O's Summary    24 Oct 2024 07:  -  25 Oct 2024 07:00  --------------------------------------------------------  IN: 1500 mL / OUT: 1400 mL / NET: 100 mL    25 Oct 2024 07:01  -  25 Oct 2024 14:13  --------------------------------------------------------  IN: 50 mL / OUT: 0 mL / NET: 50 mL        MEDICATIONS  (STANDING):  chlorhexidine 2% Cloths 1 Application(s) Topical <User Schedule>  dexAMETHasone  Injectable 4 milliGRAM(s) IV Push every 6 hours  dextrose 50% Injectable 12.5 Gram(s) IV Push once  dextrose 50% Injectable 25 Gram(s) IV Push once  dextrose 50% Injectable 25 Gram(s) IV Push once  insulin lispro (ADMELOG) corrective regimen sliding scale   SubCutaneous three times a day before meals  levETIRAcetam   Injectable 1500 milliGRAM(s) IV Push every 12 hours  pantoprazole    Tablet 40 milliGRAM(s) Oral daily  polyethylene glycol 3350 17 Gram(s) Oral daily  povidone iodine 10% Nasal Swab 1 Application(s) Both Nostrils once  senna 2 Tablet(s) Oral at bedtime  sodium chloride 0.9%. 1000 milliLiter(s) (50 mL/Hr) IV Continuous <Continuous>      RESPIRATORY:      IMAGING:   Recent imaging studies were reviewed.    LAB RESULTS:                          11.7   21.39 )-----------( 284      ( 25 Oct 2024 03:00 )             35.1           10-25    138  |  103  |  12.4  ----------------------------<  118[H]  4.3   |  25.0  |  0.74    Ca    8.8      25 Oct 2024 03:00  Phos  3.4     10-25  Mg     1.9     10-25            ABG - ( 25 Oct 2024 13:54 )  pH, Arterial: 7.470 pH, Blood: x     /  pCO2: 34    /  pO2: >496  / HCO3: 25    / Base Excess: 1.0   /  SaO2: 100.0

## 2024-10-25 NOTE — PROGRESS NOTE ADULT - ASSESSMENT
58 yo M who [place]  Dr. Sparks and [place] [facility] a [place]  hi grade sarcoma of the lung  s/p  R thoracotomy  R pneumonectomy  rescetion of 9.4 cm tumor S/P 4 cycles AIM chemotherapy LD 5/2023 and adjuvant RT to the right lung LD 9/1/23 current scans are [facility] and with Dr PETE Jean for a history of PE and irregular mural thrombus of descending AA. Completed 9 months of AC hypercoag work up was negative. Now off anticoagulation at this time.  [facility] p/w migrane and aphasia followed by R facial and R sided weakness starting 4:30 PM. 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   - MRI brain MRI BRAIN: 1.  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  The findings are suspicious for an underlying hemorrhagic mass.  Superimposed cerebral infarction or hemorrhagic conversion of an infarct is not excluded.  Mild dural thickening and enhancement overlying the left cerebral convexity may be reactive or represent tumor invasion.2.  There is mild subarachnoid hemorrhage in the left frontal region and left sylvian fissure.3.  There is trace subdural hemorrhage overlying the left renal convexity, measuring 1 mm in caliber.4.  There is trace intraventricular hemorrhage dependently in the occipital horns of both lateral ventricles.  No hydrocephalus.5.  The dural venous sinuses and cavernous sinuses are patent on the postcontrast MPRAGE sequence; however there appears to be a thin linear nonocclusive filling defect in the lateral aspect the left transverse sinus extending to the junction with the left sigmoid sinus, which is suspicious for nonocclusive thrombus.  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  Evaluation of the hypoplastic left transverse sinus, left sigmoid sinus and left jugular bulb is limited by flow artifacts on the noncontrast MR venogram and incomplete imaging on the postcontrast MR venogram.  There is suspected nonocclusive thrombus in the left transverse sinus on the postcontrast MPRAGE images of the brain as discussed above.  Repeat postcontrast MR venogram of the entire brain or dedicated CT venogram may be obtained for further evaluation.The remainder of the dural venous sinuses are patent without suspicious filling defect.  - Neuro following  - EEG Abnormal EEG study There is a risk for focal seizures with onset in the left anterior temporal region.Left hemispheric focal cerebral dysfunction, which is likely structural in etiology.There were no seizures recorded.    - S/P  Cerebral Angiography  Pre- Procedure Diagnosis: Left frontal parietal IPH Post- Procedure Diagnosis: negative cerebral angiogram, no vascular abnormality seen   - CT CAP -*  No evidence of metastatic disease or suspicious adenopathy in the chest, abdomen, or pelvis.*  Right pneumonectomy changes, similar to findings present on prior exams.  - Neurosurgery following  -Keppra 1500 BID and - Dex 4 q 6 hrs    -history of PE and irregular mural thrombus of descending AA.   - Completed 9 months of AC   - hypercoag work up was negative.  - was off anticoagulation at this time.  - dopplers negative for DVT    Right lung hi grade sarcoma   - [place]  [place]. Sparks and [place] [facility]  -  s/p  R thoracotomy  R pneumonectomy  resection of 9.4 cm tumor   - S/P 4 cycles AIM chemotherapy LD 5/2023   - S/P adjuvant RT to the right lung LD 9/1/23   -  current scans are [facility]  - S/P R pneumonectomy   	  WBC 21.39 likely due to steroids     Will follow and update [facility]            58 yo M who [place]  Dr. Sparks and [place] [facility] [place] a [place] of  hi grade sarcoma of the lung  s/p  R thoracotomy  R pneumonectomy  rescetion of 9.4 cm tumor S/P 4 cycles AIM chemotherapy LD 5/2023 and adjuvant RT to the right lung LD 9/1/23 current scans are [facility] and with Dr PETE Jean for a history of PE and irregular mural thrombus of descending AA. Completed 9 months of AC hypercoag work up was negative. Now off anticoagulation at this time.  [place] [facility] p/w migrane and aphasia followed by R facial and R sided weakness starting 4:30 PM. 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   - MRI brain MRI BRAIN: 1.  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  The findings are suspicious for an underlying hemorrhagic mass.  Superimposed cerebral infarction or hemorrhagic conversion of an infarct is not excluded.  Mild dural thickening and enhancement overlying the left cerebral convexity may be reactive or represent tumor invasion.2.  There is mild subarachnoid hemorrhage in the left frontal region and left sylvian fissure.3.  There is trace subdural hemorrhage overlying the left renal convexity, measuring 1 mm in caliber.4.  There is trace intraventricular hemorrhage dependently in the occipital horns of both lateral ventricles.  No hydrocephalus.5.  The dural venous sinuses and cavernous sinuses are patent on the postcontrast MPRAGE sequence; however there appears to be a thin linear nonocclusive filling defect in the lateral aspect the left transverse sinus extending to the junction with the left sigmoid sinus, which is suspicious for nonocclusive thrombus.  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  Evaluation of the hypoplastic left transverse sinus, left sigmoid sinus and left jugular bulb is limited by flow artifacts on the noncontrast MR venogram and incomplete imaging on the postcontrast MR venogram.  There is suspected nonocclusive thrombus in the left transverse sinus on the postcontrast MPRAGE images of the brain as discussed above.  Repeat postcontrast MR venogram of the entire brain or dedicated CT venogram may be obtained for further evaluation.The remainder of the dural venous sinuses are patent without suspicious filling defect.  - Neuro following  - EEG Abnormal EEG study There is a risk for focal seizures with onset in the left anterior temporal region.Left hemispheric focal cerebral dysfunction, which is likely structural in etiology.There were no seizures recorded.    - S/P  Cerebral Angiography  Pre- Procedure Diagnosis: Left frontal parietal IPH Post- Procedure Diagnosis: negative cerebral angiogram, no vascular abnormality seen   - CT CAP -*  No evidence of metastatic disease or suspicious adenopathy in the chest, abdomen, or pelvis.*  Right pneumonectomy changes, similar to findings present on prior exams.  - Neurosurgery following  -Keppra 1500 BID and - Dex 4 q 6 hrs  -   Awake and alert family at bedside - plan for OR for resection today    -history of PE and irregular mural thrombus of descending AA.   - Completed 9 months of AC   - hypercoag work up was negative.  - was off anticoagulation at this time.  - dopplers negative for DVT    Right lung hi grade sarcoma   - follows  Dr. Sparks and Dr. Guadarrama [place] [facility] [place]  -  s/p  R thoracotomy  R pneumonectomy  resection of 9.4 cm tumor   - S/P 4 cycles AIM chemotherapy LD 5/2023   - S/P adjuvant RT to the right lung LD 9/1/23   -  current scans are [facility]  - S/P R pneumonectomy   	  WBC 21.39 likely due to steroids     Will follow and update [facility]

## 2024-10-25 NOTE — PROGRESS NOTE ADULT - ASSESSMENT
ASSESSMENT:  60 yo M with PMHx of lung cancer (2021 ) s/p lobectomy transfer from [place] p/w migrane and aphasia followed by R facial and R sided weakness starting 4:30 PM. 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     PLAN:    - Continue Q1h neuro checks  - Plan for OR today  - MR brain w/ and w/o shows nonocclusive thrombus in the left transverse sinus  - CT CAP for further mets workup neg  - Keppra 1500 BID   - Dex 4 q 6 hrs   - Plan discussed w/ Dr. Melendrez

## 2024-10-25 NOTE — PROGRESS NOTE ADULT - ASSESSMENT
ASSESSMENT:  59yM w/ PMHx of lung cancer (2021) s/p R lobectomy [place] [facility] [place]/w migrane and aphasia followed by R facial and R sided weakness starting 4:30 PM. 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    PLAN:    -Q1h neuro checks  -CTH in AM   -STAT CTH for any changes in neuro exam  -MRI brain w/wo   -Pain control prn, avoid oversedation  --160  -Keppra 1500mg BID  -Decadron 4q6h; ISS and PPI while on steroid   -Abx x 3 total doses   -Dysphagia screen and then advance diet as tolerated  -Plan for repeat angio on Monday given conern for AVM in OR  -Bowel regimen: senna, miralax; monitor BMs  -VTE prophylaxis: SCDs, hold chemoprophylaxis due to: post-op bleeding risk  -Activity: PT, OT  -Further medical management per NSICU  -Discussed case with Dr. Melendrez

## 2024-10-25 NOTE — PROGRESS NOTE ADULT - SUBJECTIVE AND OBJECTIVE BOX
POST-OPERATIVE NOTE    Procedure: Left craniotomy for left frontal brain lesion resection (possible AVM resection)    Diagnosis/Indication: L frontal ICH/brain lesion    Surgeon: Dr. Fracisco Melendrez    INTERVAL HPI/ACUTE EVENTS:  59yM w/ PMHx of lung cancer (2021) s/p R lobectomy transfer from [facility] [place] p/w migrane and aphasia followed by R facial and R sided weakness starting 4:30 PM. 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 Patient seen lying comfortably in bed. Pain controlled with medication.    VITALS:  T(C): 36 (10-25-24 @ 16:30), Max: 36.8 (10-25-24 @ 11:33)  HR: 79 (10-25-24 @ 16:30) (68 - 90)  BP: 119/76 (10-25-24 @ 16:30) (102/73 - 146/80)  RR: 14 (10-25-24 @ 16:30) (10 - 22)  SpO2: 100% (10-25-24 @ 16:30) (95% - 100%)  Wt(kg): --    PHYSICAL EXAM:  GENERAL: NAD  HEAD: S/p L crani. Dressing clean, dry, intact.   REBEKA COMA SCORE: E- V- M- = 15       E: 4= opens eyes spontaneously 3= to voice 2= to noxious 1= no opening       V: 5= oriented 4= confused 3= inappropriate words 2= incomprehensible sounds 1= nonverbal 1T= intubated       M: 6= follows commands 5= localizes 4= withdraws 3= flexor posturing 2= extensor posturing 1= no movement  MENTAL STATUS: AAO x 3 with choices (correct year, wrong month), severe expressive aphasia but nods appropriately.  following simple commands    CRANIAL NERVES: PERRL, EOMI without nystagmus. R facial, Tongue deviated slight towards right. Hearing grossly intact. Head turning and shoulder shrug intact.     MOTOR: strength 5/5 LUE/BLE, RUE: R HG 3/5, rest of RUE 4/5    SENSATION: grossly intact to light touch all extremities  COORDINATION: Gait testing deferred  CHEST/LUNG: Nonlabored on room air  SKIN: Warm, dry    LABS:                        11.7   21.39 )-----------( 284      ( 25 Oct 2024 03:00 )             35.1     10-25    138  |  103  |  12.4  ----------------------------<  118[H]  4.3   |  25.0  |  0.74    Ca    8.8      25 Oct 2024 03:00  Phos  3.4     10-25  Mg     1.9     10-25        RADIOLOGY/OTHER:  MR Brain Stereotactic w/wo IV Cont (10.22.24 @ 16:56)   IMPRESSION:  MRI BRAIN:  1.  A bilobed hemorrhage in the left frontal convexity demonstrates   patchy peripheral diffusion restriction and demonstrates patchy   peripheral nodular enhancement, some of which may be leptomeningeal, and   which extends to the dura.  The findings are suspicious for an underlying   hemorrhagic mass.  Superimposed cerebral infarction or hemorrhagic   conversion of an infarct is not excluded.  Mild dural thickening and   enhancement overlying the left cerebral convexity may be reactive or   represent tumor invasion.  2.  There is mild subarachnoid hemorrhage in the left frontal region and   left sylvian fissure.  3.  There is trace subdural hemorrhage overlying the left renal   convexity, measuring 1 mm in caliber.  4.  There is trace intraventricular hemorrhage dependently in the   occipital horns of both lateral ventricles.  No hydrocephalus.  5.  The duralvenous sinuses and cavernous sinuses are patent on the   postcontrast MPRAGE sequence; however there appears to be a thin linear   nonocclusive filling defect in the lateral aspect the left transverse   sinus extending to the junction with the left sigmoid sinus, which is   suspicious for nonocclusive thrombus.    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  Evaluation of the hypoplastic left transverse   sinus, left sigmoid sinus and left jugular bulb is limited by flow   artifacts on the noncontrast MR venogram and incomplete imaging on the   postcontrast MR venogram.  There is suspected nonocclusive thrombus in   the left transverse sinus on the postcontrast MPRAGEimages of the brain   as discussed above.  Repeat postcontrast MR venogram of the entire brain   or dedicated CT venogram may be obtained for further evaluation.    The remainder of the dural venous sinuses are patent without suspicious   filling defect.     POST-OPERATIVE NOTE    Procedure: Left craniotomy for left frontal brain lesion resection (possible AVM resection)    Diagnosis/Indication: L frontal ICH/brain lesion    Surgeon: Dr. Fracisco Melendrez    INTERVAL HPI/ACUTE EVENTS:  59yM w/ PMHx of lung cancer (2021) s/p R lobectomy transfer from [facility] [place] p/w migrane and aphasia followed by R facial and R sided weakness starting 4:30 PM. 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 Patient seen lying comfortably in bed. Pain controlled with medication.    VITALS:  T(C): 36 (10-25-24 @ 16:30), Max: 36.8 (10-25-24 @ 11:33)  HR: 79 (10-25-24 @ 16:30) (68 - 90)  BP: 119/76 (10-25-24 @ 16:30) (102/73 - 146/80)  RR: 14 (10-25-24 @ 16:30) (10 - 22)  SpO2: 100% (10-25-24 @ 16:30) (95% - 100%)  Wt(kg): --    PHYSICAL EXAM:  GENERAL: NAD  HEAD: S/p L crani. Dressing clean, dry, intact.   REBEKA COMA SCORE: E- V- M- = 15       E: 4= opens eyes spontaneously 3= to voice 2= to noxious 1= no opening       V: 5= oriented 4= confused 3= inappropriate words 2= incomprehensible sounds 1= nonverbal 1T= intubated       M: 6= follows commands 5= localizes 4= withdraws 3= flexor posturing 2= extensor posturing 1= no movement  MENTAL STATUS: AAO x 3 with choices, severe expressive aphasia but nods appropriately. Following simple commands    CRANIAL NERVES: PERRL, EOMI without nystagmus. R facial, Tongue deviated slight towards right. Hearing grossly intact. Head turning and shoulder shrug intact.     MOTOR: strength 5/5 LUE/BLE, RUE: R bi 4/5, tri 4-/5, R HG 3/5    SENSATION: grossly intact to light touch all extremities  COORDINATION: Gait testing deferred  CHEST/LUNG: Nonlabored on room air  SKIN: Warm, dry    LABS:                        11.7   21.39 )-----------( 284      ( 25 Oct 2024 03:00 )             35.1     10-25    138  |  103  |  12.4  ----------------------------<  118[H]  4.3   |  25.0  |  0.74    Ca    8.8      25 Oct 2024 03:00  Phos  3.4     10-25  Mg     1.9     10-25        RADIOLOGY/OTHER:  MR Brain Stereotactic w/wo IV Cont (10.22.24 @ 16:56)   IMPRESSION:  MRI BRAIN:  1.  A bilobed hemorrhage in the left frontal convexity demonstrates   patchy peripheral diffusion restriction and demonstrates patchy   peripheral nodular enhancement, some of which may be leptomeningeal, and   which extends to the dura.  The findings are suspicious for an underlying   hemorrhagic mass.  Superimposed cerebral infarction or hemorrhagic   conversion of an infarct is not excluded.  Mild dural thickening and   enhancement overlying the left cerebral convexity may be reactive or   represent tumor invasion.  2.  There is mild subarachnoid hemorrhage in the left frontal region and   left sylvian fissure.  3.  There is trace subdural hemorrhage overlying the left renal   convexity, measuring 1 mm in caliber.  4.  There is trace intraventricular hemorrhage dependently in the   occipital horns of both lateral ventricles.  No hydrocephalus.  5.  The duralvenous sinuses and cavernous sinuses are patent on the   postcontrast MPRAGE sequence; however there appears to be a thin linear   nonocclusive filling defect in the lateral aspect the left transverse   sinus extending to the junction with the left sigmoid sinus, which is   suspicious for nonocclusive thrombus.    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  Evaluation of the hypoplastic left transverse   sinus, left sigmoid sinus and left jugular bulb is limited by flow   artifacts on the noncontrast MR venogram and incomplete imaging on the   postcontrast MR venogram.  There is suspected nonocclusive thrombus in   the left transverse sinus on the postcontrast MPRAGEimages of the brain   as discussed above.  Repeat postcontrast MR venogram of the entire brain   or dedicated CT venogram may be obtained for further evaluation.    The remainder of the dural venous sinuses are patent without suspicious   filling defect.

## 2024-10-25 NOTE — PROGRESS NOTE ADULT - SUBJECTIVE AND OBJECTIVE BOX
58 yo M who follows  Dr. Sparks and Dr. Guadarrama [place] [facility] [place] a history of  hi grade sarcoma of the lung  s/p  R thoracotomy  R pneumonectomy  rescetion of 9.4 cm tumor S/P 4 cycles AIM chemotherapy LD 5/2023 and adjuvant RT to the right lung LD 9/1/23 current scans are [facility] and with Dr PETE Jean for a history of PE and irregular mural thrombus of descending AA. Completed 9 months of AC hypercoag work up was negative. Now off anticoagulation at this time. presents with aphasia starting 4:30 pm 10/21/2024  followed by new onset R facial droop  and R sided weakness starting this morning 10/22/2024. Wife states she gave one tablet of baby ASA around 4:30 pm yesterdat for migrane. Patient denies daily use of AC/AP. Denies trauma. NIHSS 7 upon admission. Patient found to have L posterior frontal cortical ICH, transfered to [facility] for further care. Patient on cardene in ED, transferred to Neuro ICU for monitoring    10/25/24 Awake and alert family at bedside - plan for OR for resection today    PAST MEDICAL & SURGICAL HISTORY:  Lung cancer    Allergies  No Known Allergies    MEDICATIONS  (STANDING):  chlorhexidine 2% Cloths 1 Application(s) Topical <User Schedule>  dexAMETHasone  Injectable 4 milliGRAM(s) IV Push every 6 hours  dextrose 50% Injectable 12.5 Gram(s) IV Push once  dextrose 50% Injectable 25 Gram(s) IV Push once  dextrose 50% Injectable 25 Gram(s) IV Push once  insulin lispro (ADMELOG) corrective regimen sliding scale   SubCutaneous three times a day before meals  levETIRAcetam   Injectable 1500 milliGRAM(s) IV Push every 12 hours  polyethylene glycol 3350 17 Gram(s) Oral daily  senna 2 Tablet(s) Oral at bedtime  sodium chloride 0.9%. 1000 milliLiter(s) (50 mL/Hr) IV Continuous <Continuous>  sodium phosphate 15 milliMole(s)/250 mL IVPB 15 milliMole(s) IV Intermittent once    MEDICATIONS  (PRN):  hydrALAZINE Injectable 10 milliGRAM(s) IV Push every 2 hours PRN SBP>150  labetalol Injectable 10 milliGRAM(s) IV Push every 2 hours PRN SBP>150    Vital Signs Last 24 Hrs  T(C): 36.7 (25 Oct 2024 07:59), Max: 36.8 (24 Oct 2024 16:05)  T(F): 98 (25 Oct 2024 07:59), Max: 98.3 (24 Oct 2024 16:05)  HR: 71 (25 Oct 2024 09:00) (68 - 90)  BP: 117/87 (25 Oct 2024 09:00) (102/73 - 146/80)  BP(mean): 96 (25 Oct 2024 09:00) (82 - 109)  RR: 19 (25 Oct 2024 09:00) (10 - 27)  SpO2: 95% (25 Oct 2024 09:00) (95% - 100%)    Parameters below as of 25 Oct 2024 08:00  Patient On (Oxygen Delivery Method): room air        PHYSICAL EXAM:  General: No Acute Distress   Neurological: Awake, alert & oriented following Commands, Severe expressive aphasia.    Cardiovascular:  +S1, +S2  Gastrointestinal: Soft  Nondistended      CBC                          11.7   21.39 )-----------( 284      ( 25 Oct 2024 03:00 )             35.1                           13.4   12.77 )-----------( 296      ( 24 Oct 2024 03:30 )             40.0       CHEM    10-25    138  |  103  |  12.4  ----------------------------<  118[H]  4.3   |  25.0  |  0.74    Ca    8.8      25 Oct 2024 03:00  Phos  3.4     10-25  Mg     1.9     10-25        10-24    138  |  102  |  12.2  ----------------------------<  141[H]  4.8   |  24.0  |  0.75    Ca    9.1      24 Oct 2024 03:30  Phos  2.5     10-24  Mg     2.4     10-24    TPro  7.5  /  Alb  3.9  /  TBili  0.7  /  DBili  x   /  AST  18  /  ALT  9   /  AlkPhos  120  10-22

## 2024-10-25 NOTE — CONSULT NOTE ADULT - SUBJECTIVE AND OBJECTIVE BOX
HPI:  60 yo M with PMHX of lung cancer(diagnosed 2021) s/p R lobectomy ([facility] 2022) and HLD  p/w migrane and aphasia starting 4:30 pm 10/21/2024  followed by new onset R facial droop  and R sided weakness starting this morning 10/22/2024. Wife states she gave one tablet of baby ASA around 4:30 pm yesterdat for migrane. Patient denies daily use of AC/AP. Denies trauma. NIHSS 7 upon admission. Patient found to have L posterior frontal cortical ICH, transfered to [facility] for further care. Patient on cardene in ED, transfered to NeuroICU for monitoring.     Interval HPI: Patient went to OR today for resection of lesion. Patient was extubated and returned to ICU. Post op exam pending     PMHx:   Lung cancer diagnosed in 2021, s/p R Lobectomy- follows with [facility]  HLD: admits noncompliant with medication      Vital Signs Last 24 Hrs  T(C): 36.8 (25 Oct 2024 11:33), Max: 36.8 (25 Oct 2024 11:33)  T(F): 98.3 (25 Oct 2024 11:33), Max: 98.3 (25 Oct 2024 11:33)  HR: 79 (25 Oct 2024 12:00) (68 - 90)  BP: 134/94 (25 Oct 2024 12:00) (102/73 - 146/80)  BP(mean): 107 (25 Oct 2024 12:00) (83 - 109)  RR: 20 (25 Oct 2024 12:00) (10 - 22)  SpO2: 99% (25 Oct 2024 12:00) (95% - 100%)    Parameters below as of 25 Oct 2024 12:00  Patient On (Oxygen Delivery Method): room air      PHYSICAL EXAM:  GENERAL: no acute distress  HEAD:  Atraumatic, normocephalic  NEURO: sedated, PERRLA, not fc, VELASCO spont  CHEST/LUNG: Clear to auscultation bilaterally; no rales, rhonchi, wheezing, or rubs  ABDOMEN: Soft, nontender, nondistended;   EXTREMITIES:  2+ peripheral pulses, no clubbing, cyanosis, or edema  SKIN: Warm, dry; no rashes or lesions      LABS:                        11.7   21.39 )-----------( 284      ( 25 Oct 2024 03:00 )             35.1     10-25    138  |  103  |  12.4  ----------------------------<  118[H]  4.3   |  25.0  |  0.74    Ca    8.8      25 Oct 2024 03:00  Phos  3.4     10-25  Mg     1.9     10-25        Urinalysis Basic - ( 25 Oct 2024 03:00 )    Color: x / Appearance: x / SG: x / pH: x  Gluc: 118 mg/dL / Ketone: x  / Bili: x / Urobili: x   Blood: x / Protein: x / Nitrite: x   Leuk Esterase: x / RBC: x / WBC x   Sq Epi: x / Non Sq Epi: x / Bacteria: x        10-24 @ 07:01  -  10-25 @ 07:00  --------------------------------------------------------  IN: 1500 mL / OUT: 1400 mL / NET: 100 mL    10-25 @ 07:01  -  10-25 @ 16:20  --------------------------------------------------------  IN: 250 mL / OUT: 300 mL / NET: -50 mL          RADIOLOGY & ADDITIONAL TESTS:    < from: CT Head No Cont (10.23.24 @ 14:57) >  IMPRESSION:    No significant interval change from CT brain.    Similar-appearing left lateral frontal parenchymal hemorrhagic lesion   measures 3.2 x 2.6 x 2.5 cm (maximal AP x TV x CC dimensions).    Contiguous 0.9 x 2.3 x 1.7 cm hemorrhage along the superior aspect of the   primary hemorrhagic lesion.    Previously seen new region of high attenuation in the inferior right   parietal lobe is seen to represent artifact on the current examination.    --- End of Report ---            STAN WILSON MD; Attending Radiologist  This document has been electronically signed. Oct 23 2024  3:17PM    < end of copied text >

## 2024-10-26 LAB
ANION GAP SERPL CALC-SCNC: 12 MMOL/L — SIGNIFICANT CHANGE UP (ref 5–17)
BUN SERPL-MCNC: 13.9 MG/DL — SIGNIFICANT CHANGE UP (ref 8–20)
CALCIUM SERPL-MCNC: 8.7 MG/DL — SIGNIFICANT CHANGE UP (ref 8.4–10.5)
CHLORIDE SERPL-SCNC: 105 MMOL/L — SIGNIFICANT CHANGE UP (ref 96–108)
CO2 SERPL-SCNC: 23 MMOL/L — SIGNIFICANT CHANGE UP (ref 22–29)
CREAT SERPL-MCNC: 0.84 MG/DL — SIGNIFICANT CHANGE UP (ref 0.5–1.3)
EGFR: 100 ML/MIN/1.73M2 — SIGNIFICANT CHANGE UP
GLUCOSE BLDC GLUCOMTR-MCNC: 107 MG/DL — HIGH (ref 70–99)
GLUCOSE BLDC GLUCOMTR-MCNC: 130 MG/DL — HIGH (ref 70–99)
GLUCOSE BLDC GLUCOMTR-MCNC: 144 MG/DL — HIGH (ref 70–99)
GLUCOSE SERPL-MCNC: 143 MG/DL — HIGH (ref 70–99)
HCT VFR BLD CALC: 33.5 % — LOW (ref 39–50)
HGB BLD-MCNC: 11.3 G/DL — LOW (ref 13–17)
MAGNESIUM SERPL-MCNC: 1.9 MG/DL — SIGNIFICANT CHANGE UP (ref 1.6–2.6)
MCHC RBC-ENTMCNC: 28.8 PG — SIGNIFICANT CHANGE UP (ref 27–34)
MCHC RBC-ENTMCNC: 33.7 GM/DL — SIGNIFICANT CHANGE UP (ref 32–36)
MCV RBC AUTO: 85.5 FL — SIGNIFICANT CHANGE UP (ref 80–100)
PHOSPHATE SERPL-MCNC: 3.7 MG/DL — SIGNIFICANT CHANGE UP (ref 2.4–4.7)
PLATELET # BLD AUTO: 283 K/UL — SIGNIFICANT CHANGE UP (ref 150–400)
POTASSIUM SERPL-MCNC: 4 MMOL/L — SIGNIFICANT CHANGE UP (ref 3.5–5.3)
POTASSIUM SERPL-SCNC: 4 MMOL/L — SIGNIFICANT CHANGE UP (ref 3.5–5.3)
RBC # BLD: 3.92 M/UL — LOW (ref 4.2–5.8)
RBC # FLD: 14.6 % — HIGH (ref 10.3–14.5)
SODIUM SERPL-SCNC: 140 MMOL/L — SIGNIFICANT CHANGE UP (ref 135–145)
WBC # BLD: 19.04 K/UL — HIGH (ref 3.8–10.5)
WBC # FLD AUTO: 19.04 K/UL — HIGH (ref 3.8–10.5)

## 2024-10-26 PROCEDURE — 93010 ELECTROCARDIOGRAM REPORT: CPT

## 2024-10-26 PROCEDURE — 99291 CRITICAL CARE FIRST HOUR: CPT

## 2024-10-26 PROCEDURE — 70450 CT HEAD/BRAIN W/O DYE: CPT | Mod: 26

## 2024-10-26 PROCEDURE — 70553 MRI BRAIN STEM W/O & W/DYE: CPT | Mod: 26

## 2024-10-26 RX ORDER — ACETAMINOPHEN 500 MG
1000 TABLET ORAL ONCE
Refills: 0 | Status: COMPLETED | OUTPATIENT
Start: 2024-10-26 | End: 2024-10-26

## 2024-10-26 RX ADMIN — DEXAMETHASONE 1.5 MG 4 MILLIGRAM(S): 1.5 TABLET ORAL at 06:30

## 2024-10-26 RX ADMIN — Medication 2000 MILLIGRAM(S): at 06:31

## 2024-10-26 RX ADMIN — Medication 2 TABLET(S): at 21:58

## 2024-10-26 RX ADMIN — LEVETIRACETAM 1500 MILLIGRAM(S): 500 TABLET, FILM COATED ORAL at 11:57

## 2024-10-26 RX ADMIN — DEXAMETHASONE 1.5 MG 4 MILLIGRAM(S): 1.5 TABLET ORAL at 11:57

## 2024-10-26 RX ADMIN — POLYETHYLENE GLYCOL 3350 17 GRAM(S): 17 POWDER, FOR SOLUTION ORAL at 11:57

## 2024-10-26 RX ADMIN — PANTOPRAZOLE SODIUM 40 MILLIGRAM(S): 40 TABLET, DELAYED RELEASE ORAL at 11:57

## 2024-10-26 RX ADMIN — LEVETIRACETAM 1500 MILLIGRAM(S): 500 TABLET, FILM COATED ORAL at 23:14

## 2024-10-26 RX ADMIN — Medication 400 MILLIGRAM(S): at 09:58

## 2024-10-26 RX ADMIN — CHLORHEXIDINE GLUCONATE 1 APPLICATION(S): 40 SOLUTION TOPICAL at 06:31

## 2024-10-26 RX ADMIN — DEXAMETHASONE 1.5 MG 4 MILLIGRAM(S): 1.5 TABLET ORAL at 18:23

## 2024-10-26 RX ADMIN — Medication 10 MILLIGRAM(S): at 11:56

## 2024-10-26 RX ADMIN — Medication 1000 MILLIGRAM(S): at 10:45

## 2024-10-26 RX ADMIN — DEXAMETHASONE 1.5 MG 4 MILLIGRAM(S): 1.5 TABLET ORAL at 23:14

## 2024-10-26 NOTE — PHYSICAL THERAPY INITIAL EVALUATION ADULT - GENERAL OBSERVATIONS, REHAB EVAL
Pt received in chair + IV + tele//BP monitoring, family present, non-verbal/aphasic, in NAD, breathing on RA, nonverbal indicators of pain 0/10, agreeable to PT eval
Pt received in bed + IV + tele//bp monitoring + EEG monitoring, breathing on RA, family present, pt aphasic nonverbal, agreeable to PT eval

## 2024-10-26 NOTE — PHYSICAL THERAPY INITIAL EVALUATION ADULT - PATIENT/FAMILY/SIGNIFICANT OTHER GOALS STATEMENT, PT EVAL
Unable to verbalize goals at this time, per wife - to get better
Pt unable to verbalize goals at this time, per family to get better

## 2024-10-26 NOTE — PROGRESS NOTE ADULT - SUBJECTIVE AND OBJECTIVE BOX
59M PMHX lung cancer(diagnosed ) s/p R lobectomy ([facility] ) and HLD  with L frontal cortical ICH concerning for hemorrhagic neoplastic lesion, transfered to [facility] for further care (10/25/2024)    24hr EVENTS:  10/23 - MRI consistent with most likely neoplastic eitiology. EEG without further seizures  10/24 - DSA without vascular anomaly  10/25 - OR for tumor resection    -----------------------------------------------------------------------------------------------------------------------------------------------------------------------------------    PHYSICAL EXAM:  Neurological:   - Awake, alert & oriented to person, place and time (with choices)   - Following Commands, comprehension seem intact, severe expressive aphasia - non-verbal  - PERRL, EOMI, R facial asymmetry.   - Muscle Strength: RUE: 4+/5 + Slight R drift  H/5  LUE: 5/5 RLE: 5/5 LLE: 5/5   - Sensation Intact to Light Touch     CV: RRR  Pulm: clear to auscultation  Abd: Soft, nontender, nondistended  Ext: no noted edema in lower ext  Skin: warm, dry    ------------------------------------------------------------------------------------------------------    ICU Vital Signs Last 24 Hrs  T(C): 36.8 (26 Oct 2024 04:17), Max: 36.8 (26 Oct 2024 04:17)  T(F): 98.3 (26 Oct 2024 04:17), Max: 98.3 (26 Oct 2024 04:17)  HR: 81 (26 Oct 2024 10:00) (69 - 89)  BP: 127/82 (26 Oct 2024 10:00) (105/76 - 139/84)  BP(mean): 96 (26 Oct 2024 10:00) (72 - 105)  ABP: 134/66 (25 Oct 2024 18:00) (134/66 - 148/69)  ABP(mean): 75 (25 Oct 2024 18:00) (75 - 96)  RR: 20 (26 Oct 2024 10:00) (13 - 24)  SpO2: 99% (26 Oct 2024 10:00) (95% - 100%)    O2 Parameters below as of 26 Oct 2024 08:00  Patient On (Oxygen Delivery Method): room air

## 2024-10-26 NOTE — PHYSICAL THERAPY INITIAL EVALUATION ADULT - PERTINENT HX OF CURRENT PROBLEM, REHAB EVAL
59M PMHX lung cancer(diagnosed 2021) s/p R lobectomy ([facility] 2022) and HLD  with L frontal cortical ICH concerning for hemorrhagic neoplastic lesion, transfered to [facility] for further care
59yM w/ PMHx of lung cancer (2021) s/p R lobectomy transfer from [facility] [place] p/w migrane and aphasia followed by R facial and R sided weakness starting 4:30 PM. 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

## 2024-10-26 NOTE — PHYSICAL THERAPY INITIAL EVALUATION ADULT - PLANNED THERAPY INTERVENTIONS, PT EVAL
balance training/bed mobility training/gait training/neuromuscular re-education/ROM/strengthening/transfer training
balance training/bed mobility training/gait training/ROM/strengthening/transfer training

## 2024-10-26 NOTE — PHYSICAL THERAPY INITIAL EVALUATION ADULT - GAIT DEVIATIONS NOTED, PT EVAL
decreased teodoro/decreased step length/decreased stride length
decreased teodoro/decreased step length/decreased stride length

## 2024-10-26 NOTE — PHYSICAL THERAPY INITIAL EVALUATION ADULT - ADDITIONAL COMMENTS
pt unable to provide information due to expressive aphasia. Obtained from wife at bedside - pt lives in private home with wife and daughter, wife works unable to assist 24/7 if required. No steps to enter/inside. Independent prior to admission. Owns no DME however reports having access to a shower chair and cane if needed.
per previous PT eval: pt unable to provide information due to expressive aphasia. Obtained from wife at bedside - pt lives in private home with wife and daughter, wife works unable to assist 24/7 if required. No steps to enter/inside. Independent prior to admission. Owns no DME however reports having access to a shower chair and cane if needed.

## 2024-10-26 NOTE — PHYSICAL THERAPY INITIAL EVALUATION ADULT - PRECAUTIONS/LIMITATIONS, REHAB EVAL
aspiration precautions/fall precautions/seizure precautions
aspiration precautions/fall precautions/seizure precautions

## 2024-10-26 NOTE — PROGRESS NOTE ADULT - ASSESSMENT
58 yo M who follows  Dr. Sparks and Dr. Guadarrama at [facility] for a history of  hi grade sarcoma of the lung  s/p  R thoracotomy  R pneumonectomy  rescetion of 9.4 cm tumor S/P 4 cycles AIM chemotherapy LD 5/2023 and adjuvant RT to the right lung LD 9/1/23 current scans are [facility] and with Dr PETE Jean for a history of PE and irregular mural thrombus of descending AA. Completed 9 months of AC hypercoag work up was negative. Now off anticoagulation at this time.  transfer from [facility] p/w migrane and aphasia followed by R facial and R sided weakness starting 4:30 PM. 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   - MRI brain MRI BRAIN: 1.  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  The findings are suspicious for an underlying hemorrhagic mass.  Superimposed cerebral infarction or hemorrhagic conversion of an infarct is not excluded.  Mild dural thickening and enhancement overlying the left cerebral convexity may be reactive or represent tumor invasion.2.  There is mild subarachnoid hemorrhage in the left frontal region and left sylvian fissure.3.  There is trace subdural hemorrhage overlying the left renal convexity, measuring 1 mm in caliber.4.  There is trace intraventricular hemorrhage dependently in the occipital horns of both lateral ventricles.  No hydrocephalus.5.  The dural venous sinuses and cavernous sinuses are patent on the postcontrast MPRAGE sequence; however there appears to be a thin linear nonocclusive filling defect in the lateral aspect the left transverse sinus extending to the junction with the left sigmoid sinus, which is suspicious for nonocclusive thrombus.  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  Evaluation of the hypoplastic left transverse sinus, left sigmoid sinus and left jugular bulb is limited by flow artifacts on the noncontrast MR venogram and incomplete imaging on the postcontrast MR venogram.  There is suspected nonocclusive thrombus in the left transverse sinus on the postcontrast MPRAGE images of the brain as discussed above.  Repeat postcontrast MR venogram of the entire brain or dedicated CT venogram may be obtained for further evaluation.The remainder of the dural venous sinuses are patent without suspicious filling defect.  - Neuro following  - EEG Abnormal EEG study There is a risk for focal seizures with onset in the left anterior temporal region.Left hemispheric focal cerebral dysfunction, which is likely structural in etiology.There were no seizures recorded.    - S/P  Cerebral Angiography  Pre- Procedure Diagnosis: Left frontal parietal IPH Post- Procedure Diagnosis: negative cerebral angiogram, no vascular abnormality seen   - CT CAP -*  No evidence of metastatic disease or suspicious adenopathy in the chest, abdomen, or pelvis.*  Right pneumonectomy changes, similar to findings present on prior exams.  - Neurosurgery following  -Keppra 1500 BID and - Dex 4 q 6 hrs  s/p L craniotomy 10/25, await path  repeat MRI today  plan for repeat angio on Monday 10/28 for concern of AVM      -history of PE and irregular mural thrombus of descending AA.   - Completed 9 months of AC   - hypercoag work up was negative.  - was off anticoagulation at this time.  - dopplers negative for DVT    Right lung hi grade sarcoma   - follows  Dr. Sparks and Dr. Guadarrama at [facility]  -  s/p  R thoracotomy  R pneumonectomy  resection of 9.4 cm tumor   - S/P 4 cycles AIM chemotherapy LD 5/2023   - S/P adjuvant RT to the right lung LD 9/1/23   -  current scans are [facility]  - S/P R pneumonectomy   	  WBC 21.39 likely due to steroids   trending down  monitor    Will follow and update [facility]

## 2024-10-26 NOTE — PROGRESS NOTE ADULT - ASSESSMENT
59M with hemorrhagic brain lesion with vasogenic edema. Now s/p resection 10/25.  PMH Lung cancer s/p R lobectomy ([facility] 2022), chemo and RT, remote PE and irregular mural thrombus of descending AA- s/p 9 months of AC, off now.      PLAN:  - Neuro-checks  - stability CTh in am  - plan for angio on 10/28  - Keppra 1.5g BID, cont  - Dexa taper per NSGy  - SBP Goal 100-160, PRN meds  - incentive spirometry, maintain Spo2>92%  - diet as tolerated, BM regimen, PPI as on steroids  - monitor e-lytes, I/Os  - DVT ppx: SCDs, hold chemo ppx - pending stability CTh, re-eval in am  - Monitor BGL, maintain 120-180 with ISS

## 2024-10-26 NOTE — PROGRESS NOTE ADULT - SUBJECTIVE AND OBJECTIVE BOX
HPI:  60 yo M with PMHX of lung cancer(diagnosed 2021) s/p R lobectomy ([facility] 2022) and HLD  p/w migrane and aphasia starting 4:30 pm 10/21/2024  followed by new onset R facial droop  and R sided weakness starting this morning 10/22/2024. Wife states she gave one tablet of baby ASA around 4:30 pm yesterdat for migrane. Patient denies daily use of AC/AP. Denies trauma. NIHSS 7 upon admission. Patient found to have L posterior frontal cortical ICH, transfered to [facility] for further care. Patient on cardene in ED, transfered to NeuroICU for monitoring.       OVERNIGHT EVENTS:  EVANS overnight. Cepacol started for sore throat. Pt denies headaches or any complaints at this time.     Vital Signs Last 24 Hrs  T(C): 36.7 (26 Oct 2024 00:03), Max: 36.8 (25 Oct 2024 11:33)  T(F): 98 (26 Oct 2024 00:03), Max: 98.3 (25 Oct 2024 11:33)  HR: 75 (26 Oct 2024 00:00) (68 - 89)  BP: 115/78 (26 Oct 2024 00:00) (102/73 - 146/80)  BP(mean): 90 (26 Oct 2024 00:00) (72 - 107)  RR: 15 (26 Oct 2024 00:00) (14 - 23)  SpO2: 97% (26 Oct 2024 00:00) (95% - 100%)    Parameters below as of 26 Oct 2024 00:00  Patient On (Oxygen Delivery Method): room air        I&O's Summary    24 Oct 2024 07:01  -  25 Oct 2024 07:00  --------------------------------------------------------  IN: 1500 mL / OUT: 1400 mL / NET: 100 mL    25 Oct 2024 07:01  -  26 Oct 2024 01:40  --------------------------------------------------------  IN: 990 mL / OUT: 825 mL / NET: 165 mL        PHYSICAL EXAM:  General: NAD, pt is comfortably sitting up in bed, on room air  Cardiovascular: RRR, normal S1 and S2   Respiratory: lungs CTAB, no wheezing, rhonchi, or crackles   GI: normoactive BS to auscultation, abd soft, NTND   Neuro: AAOx3 (nods appropriately with choices), FC, nonverbal  CNII-CXII: PERRL 3mm briskly reactive, EOMI b/l, R facial  Motor: VELASCO x4 spontaneously, 5/5 strength LUE/RLE/LLE throughout, 4/5 RUE throughout with RUE drift  Sensation intact to light touch throughout  Extremities: distal pulses 2+ x4 symmetric  Wound/incision: left crani incision site C/D/I    LABS:                        11.7   21.39 )-----------( 284      ( 25 Oct 2024 03:00 )             35.1     10-25    138  |  103  |  12.4  ----------------------------<  118[H]  4.3   |  25.0  |  0.74    Ca    8.8      25 Oct 2024 03:00  Phos  3.4     10-25  Mg     1.9     10-25        Urinalysis Basic - ( 25 Oct 2024 03:00 )    Color: x / Appearance: x / SG: x / pH: x  Gluc: 118 mg/dL / Ketone: x  / Bili: x / Urobili: x   Blood: x / Protein: x / Nitrite: x   Leuk Esterase: x / RBC: x / WBC x   Sq Epi: x / Non Sq Epi: x / Bacteria: x          CAPILLARY BLOOD GLUCOSE      POCT Blood Glucose.: 151 mg/dL (25 Oct 2024 16:51)  POCT Blood Glucose.: 113 mg/dL (25 Oct 2024 11:28)      Drug Levels: [] N/A    CSF Analysis: [] N/A      Allergies    No Known Allergies    Intolerances      MEDICATIONS:  Antibiotics:  ceFAZolin  Injectable. 2000 milliGRAM(s) IV Push every 8 hours    Neuro:  acetaminophen     Tablet .. 650 milliGRAM(s) Oral every 6 hours PRN  levETIRAcetam   Injectable 1500 milliGRAM(s) IV Push every 12 hours  ondansetron Injectable 4 milliGRAM(s) IV Push every 6 hours PRN  oxyCODONE    IR 10 milliGRAM(s) Oral every 4 hours PRN  oxyCODONE    IR 5 milliGRAM(s) Oral every 4 hours PRN    Anticoagulation:    OTHER:  benzocaine/menthol Lozenge 1 Lozenge Oral every 4 hours PRN  chlorhexidine 2% Cloths 1 Application(s) Topical <User Schedule>  dexAMETHasone  Injectable 4 milliGRAM(s) IV Push every 6 hours  dextrose 50% Injectable 25 Gram(s) IV Push once  dextrose 50% Injectable 12.5 Gram(s) IV Push once  dextrose 50% Injectable 25 Gram(s) IV Push once  hydrALAZINE Injectable 10 milliGRAM(s) IV Push every 2 hours PRN  insulin lispro (ADMELOG) corrective regimen sliding scale   SubCutaneous three times a day before meals  labetalol Injectable 10 milliGRAM(s) IV Push every 2 hours PRN  pantoprazole    Tablet 40 milliGRAM(s) Oral daily  polyethylene glycol 3350 17 Gram(s) Oral daily  povidone iodine 10% Nasal Swab 1 Application(s) Both Nostrils once  senna 2 Tablet(s) Oral at bedtime    IVF:  sodium chloride 0.9%. 1000 milliLiter(s) IV Continuous <Continuous>    CULTURES:    RADIOLOGY & ADDITIONAL TESTS:      ASSESSMENT:  59yM w/ PMHx of lung cancer (2021) s/p R lobectomy transfer from [facility] p/w migrane and aphasia followed by R facial and R sided weakness starting 4:30 PM. 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    PLAN:  -Q1h neuro checks  -CTH in AM   -STAT CTH for any changes in neuro exam  -MRI brain w/wo in AM  -Pain control prn, avoid oversedation  -Keppra 1500mg BID  -Decadron 4q6h; ISS and PPI while on steroid   -Plan for repeat angio on Monday 10/28 given concern for AVM in OR  --160  -Bowel regimen: senna, miralax; monitor BMs  -VTE prophylaxis: SCDs, hold chemoprophylaxis due to: post-op bleeding risk  -Activity: PT, OT pending  -Further medical management per NSICU  Final plan to be discussed in AM rounds

## 2024-10-26 NOTE — PROGRESS NOTE ADULT - SUBJECTIVE AND OBJECTIVE BOX
60 yo M who follows  Dr. Sparks and Dr. Guadarrama at [facility] for a history of  hi grade sarcoma of the lung  s/p  R thoracotomy  R pneumonectomy  rescetion of 9.4 cm tumor S/P 4 cycles AIM chemotherapy LD 5/2023 and adjuvant RT to the right lung LD 9/1/23 current scans are [facility] and with Dr PETE Jean for a history of PE and irregular mural thrombus of descending AA. Completed 9 months of AC hypercoag work up was negative. Now off anticoagulation at this time. presents with aphasia starting 4:30 pm 10/21/2024  followed by new onset R facial droop  and R sided weakness starting this morning 10/22/2024. Wife states she gave one tablet of baby ASA around 4:30 pm yesterdat for migrane. Patient denies daily use of AC/AP. Denies trauma. NIHSS 7 upon admission. Patient found to have L posterior frontal cortical ICH, transfered to [facility] for further care. Patient on cardene in ED, transferred to Neuro ICU for monitoring    10/26/24 - s/p OR resection of L frontal mass/craniotomy  alert and oriented      MEDICATIONS  (STANDING):  chlorhexidine 2% Cloths 1 Application(s) Topical <User Schedule>  dexAMETHasone  Injectable 4 milliGRAM(s) IV Push every 6 hours  dextrose 50% Injectable 12.5 Gram(s) IV Push once  dextrose 50% Injectable 25 Gram(s) IV Push once  dextrose 50% Injectable 25 Gram(s) IV Push once  insulin lispro (ADMELOG) corrective regimen sliding scale   SubCutaneous three times a day before meals  levETIRAcetam   Injectable 1500 milliGRAM(s) IV Push every 12 hours  pantoprazole    Tablet 40 milliGRAM(s) Oral daily  polyethylene glycol 3350 17 Gram(s) Oral daily  senna 2 Tablet(s) Oral at bedtime    MEDICATIONS  (PRN):  acetaminophen     Tablet .. 650 milliGRAM(s) Oral every 6 hours PRN Mild Pain (1 - 3)  benzocaine/menthol Lozenge 1 Lozenge Oral every 6 hours PRN Sore Throat  hydrALAZINE Injectable 10 milliGRAM(s) IV Push every 2 hours PRN SBP>140  labetalol Injectable 10 milliGRAM(s) IV Push every 2 hours PRN SBP>140  ondansetron Injectable 4 milliGRAM(s) IV Push every 6 hours PRN Nausea and/or Vomiting  oxyCODONE    IR 5 milliGRAM(s) Oral every 4 hours PRN Moderate Pain (4 - 6)  oxyCODONE    IR 10 milliGRAM(s) Oral every 4 hours PRN Severe Pain (7 - 10)      ICU Vital Signs Last 24 Hrs  T(C): 36.8 (26 Oct 2024 04:17), Max: 36.8 (25 Oct 2024 11:33)  T(F): 98.3 (26 Oct 2024 04:17), Max: 98.3 (25 Oct 2024 11:33)  HR: 72 (26 Oct 2024 08:00) (69 - 89)  BP: 133/86 (26 Oct 2024 08:00) (105/76 - 139/84)  BP(mean): 100 (26 Oct 2024 08:00) (72 - 107)  ABP: 134/66 (25 Oct 2024 18:00) (126/63 - 148/69)  ABP(mean): 75 (25 Oct 2024 18:00) (75 - 104)  RR: 20 (26 Oct 2024 08:00) (13 - 23)  SpO2: 96% (26 Oct 2024 08:00) (95% - 100%)    O2 Parameters below as of 26 Oct 2024 08:00  Patient On (Oxygen Delivery Method): room air         PHYSICAL EXAM:  General: No Acute Distress   Neurological: Awake, alert & oriented following Commands, Severe expressive aphasia.    Cardiovascular:  +S1, +S2  Gastrointestinal: Soft  Nondistended      CBC                          11.3   19.04 )-----------( 283      ( 26 Oct 2024 04:45 )             33.5                           11.7   21.39 )-----------( 284      ( 25 Oct 2024 03:00 )             35.1                           13.4   12.77 )-----------( 296      ( 24 Oct 2024 03:30 )             40.0       CHEM    10-25    138  |  103  |  12.4  ----------------------------<  118[H]  4.3   |  25.0  |  0.74    Ca    8.8      25 Oct 2024 03:00  Phos  3.4     10-25  Mg     1.9     10-25        10-24    138  |  102  |  12.2  ----------------------------<  141[H]  4.8   |  24.0  |  0.75    Ca    9.1      24 Oct 2024 03:30  Phos  2.5     10-24  Mg     2.4     10-24    TPro  7.5  /  Alb  3.9  /  TBili  0.7  /  DBili  x   /  AST  18  /  ALT  9   /  AlkPhos  120  10-22

## 2024-10-27 LAB
ANION GAP SERPL CALC-SCNC: 14 MMOL/L — SIGNIFICANT CHANGE UP (ref 5–17)
BUN SERPL-MCNC: 12.9 MG/DL — SIGNIFICANT CHANGE UP (ref 8–20)
CALCIUM SERPL-MCNC: 8.8 MG/DL — SIGNIFICANT CHANGE UP (ref 8.4–10.5)
CHLORIDE SERPL-SCNC: 104 MMOL/L — SIGNIFICANT CHANGE UP (ref 96–108)
CO2 SERPL-SCNC: 24 MMOL/L — SIGNIFICANT CHANGE UP (ref 22–29)
CREAT SERPL-MCNC: 0.75 MG/DL — SIGNIFICANT CHANGE UP (ref 0.5–1.3)
EGFR: 103 ML/MIN/1.73M2 — SIGNIFICANT CHANGE UP
GLUCOSE BLDC GLUCOMTR-MCNC: 112 MG/DL — HIGH (ref 70–99)
GLUCOSE BLDC GLUCOMTR-MCNC: 120 MG/DL — HIGH (ref 70–99)
GLUCOSE BLDC GLUCOMTR-MCNC: 197 MG/DL — HIGH (ref 70–99)
GLUCOSE SERPL-MCNC: 137 MG/DL — HIGH (ref 70–99)
HCT VFR BLD CALC: 35 % — LOW (ref 39–50)
HGB BLD-MCNC: 11.7 G/DL — LOW (ref 13–17)
MAGNESIUM SERPL-MCNC: 2.1 MG/DL — SIGNIFICANT CHANGE UP (ref 1.6–2.6)
MCHC RBC-ENTMCNC: 29 PG — SIGNIFICANT CHANGE UP (ref 27–34)
MCHC RBC-ENTMCNC: 33.4 GM/DL — SIGNIFICANT CHANGE UP (ref 32–36)
MCV RBC AUTO: 86.8 FL — SIGNIFICANT CHANGE UP (ref 80–100)
PHOSPHATE SERPL-MCNC: 3 MG/DL — SIGNIFICANT CHANGE UP (ref 2.4–4.7)
PLATELET # BLD AUTO: 276 K/UL — SIGNIFICANT CHANGE UP (ref 150–400)
POTASSIUM SERPL-MCNC: 4.2 MMOL/L — SIGNIFICANT CHANGE UP (ref 3.5–5.3)
POTASSIUM SERPL-SCNC: 4.2 MMOL/L — SIGNIFICANT CHANGE UP (ref 3.5–5.3)
RBC # BLD: 4.03 M/UL — LOW (ref 4.2–5.8)
RBC # FLD: 14.6 % — HIGH (ref 10.3–14.5)
SODIUM SERPL-SCNC: 142 MMOL/L — SIGNIFICANT CHANGE UP (ref 135–145)
WBC # BLD: 15.45 K/UL — HIGH (ref 3.8–10.5)
WBC # FLD AUTO: 15.45 K/UL — HIGH (ref 3.8–10.5)

## 2024-10-27 PROCEDURE — 99232 SBSQ HOSP IP/OBS MODERATE 35: CPT

## 2024-10-27 PROCEDURE — 70450 CT HEAD/BRAIN W/O DYE: CPT | Mod: 26

## 2024-10-27 RX ORDER — ACETAMINOPHEN 500 MG
1000 TABLET ORAL ONCE
Refills: 0 | Status: COMPLETED | OUTPATIENT
Start: 2024-10-27 | End: 2024-10-27

## 2024-10-27 RX ORDER — DEXAMETHASONE 1.5 MG 1.5 MG/1
4 TABLET ORAL EVERY 6 HOURS
Refills: 0 | Status: COMPLETED | OUTPATIENT
Start: 2024-10-27 | End: 2024-10-29

## 2024-10-27 RX ORDER — ENOXAPARIN SODIUM 40MG/0.4ML
40 SYRINGE (ML) SUBCUTANEOUS
Refills: 0 | Status: DISCONTINUED | OUTPATIENT
Start: 2024-10-27 | End: 2024-10-27

## 2024-10-27 RX ORDER — DEXAMETHASONE 1.5 MG 1.5 MG/1
TABLET ORAL
Refills: 0 | Status: DISCONTINUED | OUTPATIENT
Start: 2024-10-27 | End: 2024-10-29

## 2024-10-27 RX ORDER — DEXAMETHASONE 1.5 MG 1.5 MG/1
2 TABLET ORAL EVERY 6 HOURS
Refills: 0 | Status: DISCONTINUED | OUTPATIENT
Start: 2024-10-29 | End: 2024-10-29

## 2024-10-27 RX ADMIN — Medication 400 MILLIGRAM(S): at 10:17

## 2024-10-27 RX ADMIN — DEXAMETHASONE 1.5 MG 4 MILLIGRAM(S): 1.5 TABLET ORAL at 11:34

## 2024-10-27 RX ADMIN — LEVETIRACETAM 1500 MILLIGRAM(S): 500 TABLET, FILM COATED ORAL at 11:34

## 2024-10-27 RX ADMIN — Medication 1000 MILLIGRAM(S): at 11:44

## 2024-10-27 RX ADMIN — POLYETHYLENE GLYCOL 3350 17 GRAM(S): 17 POWDER, FOR SOLUTION ORAL at 11:34

## 2024-10-27 RX ADMIN — CHLORHEXIDINE GLUCONATE 1 APPLICATION(S): 40 SOLUTION TOPICAL at 05:17

## 2024-10-27 RX ADMIN — Medication 40 MILLIGRAM(S): at 17:24

## 2024-10-27 RX ADMIN — PANTOPRAZOLE SODIUM 40 MILLIGRAM(S): 40 TABLET, DELAYED RELEASE ORAL at 11:34

## 2024-10-27 RX ADMIN — DEXAMETHASONE 1.5 MG 4 MILLIGRAM(S): 1.5 TABLET ORAL at 17:24

## 2024-10-27 RX ADMIN — DEXAMETHASONE 1.5 MG 4 MILLIGRAM(S): 1.5 TABLET ORAL at 05:17

## 2024-10-27 RX ADMIN — Medication 2: at 16:17

## 2024-10-27 NOTE — OCCUPATIONAL THERAPY INITIAL EVALUATION ADULT - GENERAL OBSERVATIONS, REHAB EVAL
Pt left as found, NAD, supine in bed, +IV, +Tele//BP, +EEG, +b/l VCB on RA with family bedside. Pre/post pain 0/10. Pt nonverbal, able to communicate via hand gestures. Pt agreeable to OT evaluation
Left pt as received OOB in chair, NAD, +IV, +Tele//BP, on RA with family bedside. Nonverbal signs of pain absent pre/post, pt nonverbal, communicates via head nods/hand gestures however at times with delayed processing speeds-pt aphasic. Pt agreeable to OT evaluation

## 2024-10-27 NOTE — CHART NOTE - NSCHARTNOTEFT_GEN_A_CORE
59M PMHX lung cancer(diagnosed ) s/p R lobectomy ([facility] ) and HLD  with L frontal cortical ICH concerning for hemorrhagic neoplastic lesion, transfered to [facility] for further care (10/25/2024)    Hospital events   10/23 - MRI consistent with most likely neoplastic eitiology. EEG without further seizures  10/24 - DSA without vascular anomaly  10/25 - OR for tumor resection  10/26- no Bowel movement dulcolax added    -----------------------------------------------------------------------------------------------------------------------------------------------------------------------------------    PHYSICAL EXAM:  Neurological:   - Awake, alert & oriented to person, place and time (with choices)   - Following Commands, comprehension seem intact, severe expressive aphasia - non-verbal  - PERRL, EOMI, R facial asymmetry.   - Muscle Strength: RUE: 4+/5 + Slight R drift  H/5  LUE: 5/5 RLE: 5/5 LLE: 5/5   - Sensation Intact to Light Touch     CV: RRR  Pulm: clear to auscultation  Abd: Soft, nontender, nondistended  Ext: no noted edema in lower ext  Skin: warm, dry        Assessment and Plan:   · Assessment	   59M with hemorrhagic brain lesion with vasogenic edema. Now s/p resection 10/25.  PMH Lung cancer s/p R lobectomy ([facility] ), chemo and RT, remote PE and irregular mural thrombus of descending AA- s/p 9 months of AC, off now.      PLAN:  Neuro  - Neuro-checks q 4  10/26 mri brain t1 hyperintense hemorrhage signal and nonspecidic enhancement at surgical site post op change vs neoplasm.  9mm hemorrhagic collection +MLS to right scattered cortical infarcts right hemisphere   - plan for angio on 10/28  - Keppra 1.5g BID, cont for seizures   - Dexa taper per NSGy  - tylenol and oxy for pain control    CV  - SBP Goal 100-160,   - incentive spirometry, maintain Spo2>92%    GI  - diet as tolerated, BM regimen, PPI as on steroids  - LBM 10/26    Chemoprophylaxis:  - DVT ppx: SCDs, hold chemo ppx     Endo  - Monitor BGL, maintain 120-180 with ISS      Downgrade to neurosurgery 59M PMHX lung cancer(diagnosed ) s/p R lobectomy ([facility] ) and HLD  with L frontal cortical ICH concerning for hemorrhagic neoplastic lesion, transferred to [facility] for further care (10/25/2024)    Hospital events   10/23 - MRI consistent with most likely neoplastic eitiology. EEG without further seizures  10/24 - DSA without vascular anomaly  10/25 - OR for tumor resection  10/26- no Bowel movement dulcolax added    -----------------------------------------------------------------------------------------------------------------------------------------------------------------------------------    PHYSICAL EXAM:  Neurological:   - Awake, alert & oriented to person, place and time (with choices)   - Following Commands, comprehension seem intact, severe expressive aphasia - non-verbal  - PERRL, EOMI, R facial asymmetry.   - Muscle Strength: RUE: 4+/5 + Slight R drift  H/5  LUE: 5/5 RLE: 5/5 LLE: 5/5   - Sensation Intact to Light Touch     CV: RRR  Pulm: clear to auscultation  Abd: Soft, nontender, nondistended  Ext: no noted edema in lower ext  Skin: warm, dry        Assessment and Plan:   · Assessment	   59M with hemorrhagic brain lesion with vasogenic edema. Now s/p resection 10/25.  PMH Lung cancer s/p R lobectomy ([facility] ), chemo and RT, remote PE and irregular mural thrombus of descending AA- s/p 9 months of AC, off now.      PLAN:  Neuro  - Neuro-checks q 4  10/26 mri brain t1 hyperintense hemorrhage signal and nonspecidic enhancement at surgical site post op change vs neoplasm.  9mm hemorrhagic collection +MLS to right scattered cortical infarcts right hemisphere   - plan for angio on 10/28  - Keppra 1.5g BID, cont for seizures   - Dexa taper per NSGy  - tylenol and oxy for pain control    CV  - SBP Goal 100-160,   - incentive spirometry, maintain Spo2>92%    GI  - diet as tolerated, BM regimen, PPI as on steroids  - LBM 10/26    Chemoprophylaxis:  - DVT ppx: SCDs, hold chemo ppx     Endo  - Monitor BGL, maintain 120-180 with ISS      Downgrade to neurosurgery    -----------------------------------------------        ATTENDING ATTESTATION    59M with hemorrhagic brain lesion with vasogenic edema, s/p resection 10/25.  [facility] Lung cancer s/p R lobectomy ([facility] ), chemo and RT, remote PE and irregular mural thrombus of descending AA- completed 9 months of AC, off now.    PLAN:  - Neuro-checks  - plan for angio on 10/28  - MRI later, as an outpt  - Keppra 1.5g BID, cont  - Dexa taper per NSGy  - SBP Goal 100-160, PRN meds  - incentive spirometry, maintain Spo2>92%  - diet as tolerated, BM regimen, PPI as on steroids  - monitor e-lytes, I/Os  - DVT ppx: SCDs, SQL for VTE ppx  - Monitor BGL, maintain 120-180 with ISS  - stable to be transferred to Telemetry floor    Time spent - 35 min, non-critical, included review of relevant history, clinical examination, review of data and images, discussion of treatment with the multidisciplinary team and any consultants involved in this patient’s care.

## 2024-10-27 NOTE — PROGRESS NOTE ADULT - SUBJECTIVE AND OBJECTIVE BOX
HPI:  58 yo M with PMHX of lung cancer(diagnosed 2021) s/p R lobectomy ([facility] 2022) and HLD  p/w migrane and aphasia starting 4:30 pm 10/21/2024  followed by new onset R facial droop  and R sided weakness starting this morning 10/22/2024. Wife states she gave one tablet of baby ASA around 4:30 pm yesterdat for migrane. Patient denies daily use of AC/AP. Denies trauma. NIHSS 7 upon admission. Patient found to have L posterior frontal cortical ICH, transfered to [facility] for further care. Patient on cardene in ED, transfered to NeuroICU for monitoring.     OVERNIGHT EVENTS:  EVANS overnight. Neuro exam stable.     Vital Signs Last 24 Hrs  T(C): 36.6 (27 Oct 2024 00:00), Max: 36.8 (26 Oct 2024 04:17)  T(F): 97.9 (27 Oct 2024 00:00), Max: 98.3 (26 Oct 2024 04:17)  HR: 72 (27 Oct 2024 00:00) (69 - 85)  BP: 118/89 (27 Oct 2024 00:00) (105/76 - 146/99)  BP(mean): 99 (27 Oct 2024 00:00) (84 - 114)  RR: 20 (27 Oct 2024 00:00) (11 - 24)  SpO2: 97% (27 Oct 2024 00:00) (95% - 100%)    Parameters below as of 27 Oct 2024 00:00  Patient On (Oxygen Delivery Method): room air    I&O's Summary    25 Oct 2024 07:01  -  26 Oct 2024 07:00  --------------------------------------------------------  IN: 1290 mL / OUT: 1105 mL / NET: 185 mL    26 Oct 2024 07:01  -  27 Oct 2024 00:47  --------------------------------------------------------  IN: 540 mL / OUT: 1275 mL / NET: -735 mL      PHYSICAL EXAM:  General: NAD, pt is comfortably sitting up in bed, on room air  Cardiovascular: RRR, normal S1 and S2   Respiratory: lungs CTAB, no wheezing, rhonchi, or crackles   GI: normoactive BS to auscultation, abd soft, NTND   Neuro: AAOx3 (nods appropriately with choices), FC, nonverbal  CNII-CXII: PERRL 3mm briskly reactive, EOMI b/l, R facial  Motor: VELASCO x4 spontaneously, 5/5 strength LUE/RLE/LLE throughout, 4/5 RUE throughout with RUE drift  Sensation intact to light touch throughout  Extremities: distal pulses 2+ x4 symmetric  Wound/incision: left crani incision site C/D/I    LABS:                        11.3   19.04 )-----------( 283      ( 26 Oct 2024 04:45 )             33.5     10-26    140  |  105  |  13.9  ----------------------------<  143[H]  4.0   |  23.0  |  0.84    Ca    8.7      26 Oct 2024 04:45  Phos  3.7     10-26  Mg     1.9     10-26        Urinalysis Basic - ( 26 Oct 2024 04:45 )    Color: x / Appearance: x / SG: x / pH: x  Gluc: 143 mg/dL / Ketone: x  / Bili: x / Urobili: x   Blood: x / Protein: x / Nitrite: x   Leuk Esterase: x / RBC: x / WBC x   Sq Epi: x / Non Sq Epi: x / Bacteria: x          CAPILLARY BLOOD GLUCOSE      POCT Blood Glucose.: 107 mg/dL (26 Oct 2024 18:26)  POCT Blood Glucose.: 144 mg/dL (26 Oct 2024 12:47)  POCT Blood Glucose.: 130 mg/dL (26 Oct 2024 09:17)      Drug Levels: [] N/A    CSF Analysis: [] N/A      Allergies    No Known Allergies    Intolerances      MEDICATIONS:  Antibiotics:    Neuro:  acetaminophen     Tablet .. 650 milliGRAM(s) Oral every 6 hours PRN  levETIRAcetam   Injectable 1500 milliGRAM(s) IV Push every 12 hours  ondansetron Injectable 4 milliGRAM(s) IV Push every 6 hours PRN  oxyCODONE    IR 5 milliGRAM(s) Oral every 4 hours PRN  oxyCODONE    IR 10 milliGRAM(s) Oral every 4 hours PRN    Anticoagulation:    OTHER:  benzocaine/menthol Lozenge 1 Lozenge Oral every 6 hours PRN  chlorhexidine 2% Cloths 1 Application(s) Topical <User Schedule>  dexAMETHasone  Injectable 4 milliGRAM(s) IV Push every 6 hours  dextrose 50% Injectable 25 Gram(s) IV Push once  dextrose 50% Injectable 12.5 Gram(s) IV Push once  dextrose 50% Injectable 25 Gram(s) IV Push once  hydrALAZINE Injectable 10 milliGRAM(s) IV Push every 2 hours PRN  insulin lispro (ADMELOG) corrective regimen sliding scale   SubCutaneous three times a day before meals  labetalol Injectable 10 milliGRAM(s) IV Push every 2 hours PRN  pantoprazole    Tablet 40 milliGRAM(s) Oral daily  polyethylene glycol 3350 17 Gram(s) Oral daily  senna 2 Tablet(s) Oral at bedtime    IVF:    CULTURES:    RADIOLOGY & ADDITIONAL TESTS:    ASSESSMENT:  59yM w/ PMHx of lung cancer (2021) [place] [facility] [place] followed by R facial and R sided weakness starting 4:30 PM. 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    PLAN:  -Q1h neuro checks  -Postop CTH 10/26 with 1cm acute left subdural hematoma, plan to repeat CTH in AM   -STAT CTH for any changes in neuro exam  -MRI brain w/wo postop completed  -Pain control prn, avoid oversedation  -Keppra 1500mg BID  -Decadron 4q6h; ISS and PPI while on steroids  -Plan for repeat angio on Monday 10/28 given concern for AVM in OR  --160  -Bowel regimen: senna, miralax; monitor BMs  -VTE prophylaxis: SCDs, hold chemoprophylaxis due to: post-op bleeding risk  -Activity: PT, OT pending  -Further medical management per NSICU  Final plan to be discussed in AM rounds

## 2024-10-27 NOTE — OCCUPATIONAL THERAPY INITIAL EVALUATION ADULT - ADDITIONAL COMMENTS
Pt unable to provide information due to expressive aphasia. Obtained from wife at bedside - pt lives in private home with wife and daughter, wife works unable to assist 24/7 if required. No steps to enter/inside. Pt has a tub with doors and no grab bars but has a non slip shahid. Independent prior to admission. Owns no DME however reports having access to a shower chair and cane if needed. Pt is R handed and drives.
Pt unable to provide information due to expressive aphasia. Obtained from wife at bedside - pt lives in private home with wife and daughter, wife works unable to assist 24/7 if required. No steps to enter/inside. Pt has a tub with doors and no grab bars but has a non slip shahid. Independent prior to admission. Owns no DME however reports having access to a shower chair and cane if needed. Pt is R handed and drives.

## 2024-10-27 NOTE — OCCUPATIONAL THERAPY INITIAL EVALUATION ADULT - RANGE OF MOTION EXAMINATION, UPPER EXTREMITY
RUE noted with impaired motor control/bilateral UE Active ROM was WFL  (within functional limits)
RUE noted with impaired motor control/bilateral UE Active ROM was WFL  (within functional limits)

## 2024-10-27 NOTE — OCCUPATIONAL THERAPY INITIAL EVALUATION ADULT - VISUAL ACUITY
+glasses/contacts; no complaints in vision offered.  Central and peripheral fields intact bilaterally
+glasses/contacts; no complaints in vision offered.  Central and peripheral fields intact bilaterally

## 2024-10-27 NOTE — OCCUPATIONAL THERAPY INITIAL EVALUATION ADULT - IMPAIRED TRANSFERS: SIT/STAND, REHAB EVAL
impaired balance/impaired postural control/decreased strength
impaired balance/impaired coordination/impaired motor control/impaired postural control/decreased strength

## 2024-10-27 NOTE — OCCUPATIONAL THERAPY INITIAL EVALUATION ADULT - PRECAUTIONS/LIMITATIONS, REHAB EVAL
aspiration precautions/fall precautions/seizure precautions
aspiration precautions/fall precautions/seizure precautions/surgical precautions

## 2024-10-27 NOTE — PROGRESS NOTE ADULT - SUBJECTIVE AND OBJECTIVE BOX
58 yo M who follows  Dr. Sparks and Dr. Guadarrama at [facility] for a history of  hi grade sarcoma of the lung  s/p  R thoracotomy  R pneumonectomy  rescetion of 9.4 cm tumor S/P 4 cycles AIM chemotherapy LD 5/2023 and adjuvant RT to the right lung LD 9/1/23 current scans are [facility]LE[facility] and with Dr PETE Jean for a history of PE and irregular mural thrombus of descending AA. Completed 9 months of AC hypercoag work up was negative. Now off anticoagulation at this time. presents with aphasia starting 4:30 pm 10/21/2024  followed by new onset R facial droop  and R sided weakness starting this morning 10/22/2024. Wife states she gave one tablet of baby ASA around 4:30 pm yesterdat for migrane. Patient denies daily use of AC/AP. Denies trauma. NIHSS 7 upon admission. Patient found to have L posterior frontal cortical ICH, transfered to [facility] for further care. Patient on cardene in ED, transferred to Neuro ICU for monitoring    10/27/24 - s/p OR resection of L frontal mass/craniotomy  alert and oriented  s/p MRI  not had BM      MEDICATIONS  (STANDING):  chlorhexidine 2% Cloths 1 Application(s) Topical <User Schedule>  dexAMETHasone  Injectable 4 milliGRAM(s) IV Push every 6 hours  dextrose 50% Injectable 12.5 Gram(s) IV Push once  dextrose 50% Injectable 25 Gram(s) IV Push once  dextrose 50% Injectable 25 Gram(s) IV Push once  insulin lispro (ADMELOG) corrective regimen sliding scale   SubCutaneous three times a day before meals  levETIRAcetam   Injectable 1500 milliGRAM(s) IV Push every 12 hours  pantoprazole    Tablet 40 milliGRAM(s) Oral daily  polyethylene glycol 3350 17 Gram(s) Oral daily  senna 2 Tablet(s) Oral at bedtime    MEDICATIONS  (PRN):  acetaminophen     Tablet .. 650 milliGRAM(s) Oral every 6 hours PRN Mild Pain (1 - 3)  benzocaine/menthol Lozenge 1 Lozenge Oral every 6 hours PRN Sore Throat  hydrALAZINE Injectable 10 milliGRAM(s) IV Push every 2 hours PRN SBP>140  labetalol Injectable 10 milliGRAM(s) IV Push every 2 hours PRN SBP>140  ondansetron Injectable 4 milliGRAM(s) IV Push every 6 hours PRN Nausea and/or Vomiting  oxyCODONE    IR 5 milliGRAM(s) Oral every 4 hours PRN Moderate Pain (4 - 6)  oxyCODONE    IR 10 milliGRAM(s) Oral every 4 hours PRN Severe Pain (7 - 10)      ICU Vital Signs Last 24 Hrs  T(C): 36.8 (26 Oct 2024 04:17), Max: 36.8 (25 Oct 2024 11:33)  T(F): 98.3 (26 Oct 2024 04:17), Max: 98.3 (25 Oct 2024 11:33)  HR: 72 (26 Oct 2024 08:00) (69 - 89)  BP: 133/86 (26 Oct 2024 08:00) (105/76 - 139/84)  BP(mean): 100 (26 Oct 2024 08:00) (72 - 107)  ABP: 134/66 (25 Oct 2024 18:00) (126/63 - 148/69)  ABP(mean): 75 (25 Oct 2024 18:00) (75 - 104)  RR: 20 (26 Oct 2024 08:00) (13 - 23)  SpO2: 96% (26 Oct 2024 08:00) (95% - 100%)    O2 Parameters below as of 26 Oct 2024 08:00  Patient On (Oxygen Delivery Method): room air         PHYSICAL EXAM:  General: No Acute Distress   Neurological: Awake, alert & oriented following Commands, Severe expressive aphasia.    Cardiovascular:  +S1, +S2  Gastrointestinal: Soft  Nondistended      CBC                          11.7   15.45 )-----------( 276      ( 27 Oct 2024 05:15 )             35.0                           11.3   19.04 )-----------( 283      ( 26 Oct 2024 04:45 )             33.5                           11.7   21.39 )-----------( 284      ( 25 Oct 2024 03:00 )             35.1                           13.4   12.77 )-----------( 296      ( 24 Oct 2024 03:30 )             40.0       CHEM    10-25    138  |  103  |  12.4  ----------------------------<  118[H]  4.3   |  25.0  |  0.74    Ca    8.8      25 Oct 2024 03:00  Phos  3.4     10-25  Mg     1.9     10-25        10-24    138  |  102  |  12.2  ----------------------------<  141[H]  4.8   |  24.0  |  0.75    Ca    9.1      24 Oct 2024 03:30  Phos  2.5     10-24  Mg     2.4     10-24    TPro  7.5  /  Alb  3.9  /  TBili  0.7  /  DBili  x   /  AST  18  /  ALT  9   /  AlkPhos  120  10-22

## 2024-10-27 NOTE — OCCUPATIONAL THERAPY INITIAL EVALUATION ADULT - DIAGNOSIS, OT EVAL
59 year old Male with PMHX lung cancer(diagnosed 2021) s/p R lobectomy ([facility] 2022) and HLD with L frontal cortical ICH concerning for hemorrhagic neoplastic lesion, transfered to [facility] for further care
Normal for race
59 year old Male with PMHx of lung cancer (2021) s/p R lobectomy [place] [facility] [place] p/w migrane and aphasia followed by R facial and R sided weakness. Pt found to have L posterior frontal cortical ICH. 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

## 2024-10-27 NOTE — OCCUPATIONAL THERAPY INITIAL EVALUATION ADULT - MANUAL MUSCLE TESTING RESULTS, REHAB EVAL
b/l UE grossly WFL, RUE weaker when compared to LUE
b/l UE grossly WFL, RUE weaker when compared to LUE

## 2024-10-27 NOTE — OCCUPATIONAL THERAPY INITIAL EVALUATION ADULT - PHYSICAL ASSIST/NONPHYSICAL ASSIST: EATING, OT EVAL
pt family bedside reports needs assist with dominant R hand to grasp utensils, with noted spillage, pt given built up red tubing to increase independence, demonstrating ability to properly and successfully use tool for self feeding

## 2024-10-27 NOTE — OCCUPATIONAL THERAPY INITIAL EVALUATION ADULT - REFERRAL TO ANOTHER SERVICE NEEDED, OT EVAL
neurology/physical therapy/social work/speech language pathology
neurology/physical therapy/social work/speech language pathology

## 2024-10-27 NOTE — OCCUPATIONAL THERAPY INITIAL EVALUATION ADULT - LEVEL OF INDEPENDENCE: TOILET, REHAB EVAL
Patient is wondering if there is a sooner appt? If not is there a later time. Patient states that he works late. Please advise, thank you  
standard toilet/minimum assist (75% patients effort)
to assess

## 2024-10-27 NOTE — OCCUPATIONAL THERAPY INITIAL EVALUATION ADULT - PATIENT/FAMILY/SIGNIFICANT OTHER GOALS STATEMENT, OT EVAL
None stated, pt nonverbal; Family bedside wishes pt to get well
None stated, pt family bedside wishes pt to get well

## 2024-10-27 NOTE — PROGRESS NOTE ADULT - ASSESSMENT
58 yo M who [place]  [place] Sparks [place] [facility] [place] a [place]  hi grade sarcoma of the lung  s/p  R thoracotomy  R pneumonectomy  rescetion of 9.4 cm tumor S/P 4 cycles AIM chemotherapy LD 5/2023 and adjuvant RT to the right lung LD 9/1/23 current scans are [facility] and with Dr PETE Jean for a history of PE and irregular mural thrombus of descending AA. Completed 9 months of AC hypercoag work up was negative. Now off anticoagulation at this time.  [place] [facility] [place]/w migrane and aphasia followed by R facial and R sided weakness starting 4:30 PM. 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   - MRI brain MRI BRAIN: 1.  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  The findings are suspicious for an underlying hemorrhagic mass.  Superimposed cerebral infarction or hemorrhagic conversion of an infarct is not excluded.  Mild dural thickening and enhancement overlying the left cerebral convexity may be reactive or represent tumor invasion.2.  There is mild subarachnoid hemorrhage in the left frontal region and left sylvian fissure.3.  There is trace subdural hemorrhage overlying the left renal convexity, measuring 1 mm in caliber.4.  There is trace intraventricular hemorrhage dependently in the occipital horns of both lateral ventricles.  No hydrocephalus.5.  The dural venous sinuses and cavernous sinuses are patent on the postcontrast MPRAGE sequence; however there appears to be a thin linear nonocclusive filling defect in the lateral aspect the left transverse sinus extending to the junction with the left sigmoid sinus, which is suspicious for nonocclusive thrombus.  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  Evaluation of the hypoplastic left transverse sinus, left sigmoid sinus and left jugular bulb is limited by flow artifacts on the noncontrast MR venogram and incomplete imaging on the postcontrast MR venogram.  There is suspected nonocclusive thrombus in the left transverse sinus on the postcontrast MPRAGE images of the brain as discussed above.  Repeat postcontrast MR venogram of the entire brain or dedicated CT venogram may be obtained for further evaluation.The remainder of the dural venous sinuses are patent without suspicious filling defect.  - Neuro following  - EEG Abnormal EEG study There is a risk for focal seizures with onset in the left anterior temporal region.Left hemispheric focal cerebral dysfunction, which is likely structural in etiology.There were no seizures recorded.    - S/P  Cerebral Angiography  Pre- Procedure Diagnosis: Left frontal parietal IPH Post- Procedure Diagnosis: negative cerebral angiogram, no vascular abnormality seen   - CT CAP -*  No evidence of metastatic disease or suspicious adenopathy in the chest, abdomen, or pelvis.*  Right pneumonectomy changes, similar to findings present on prior exams.  - Neurosurgery following  -Keppra 1500 BID and - Dex 4 q 6 hrs  s/p L craniotomy 10/25, await path  repeat MRI 10/26 - t1 hyperintense hemorrhage signal and nonspecidic enhancement at surgical site post op change vs neoplasm.  9mm hemorrhagic collection +MLS to right scattered cortical infarcts right hemisphere   plan for repeat angio on Monday 10/28 for concern of AVM      -history of PE and irregular mural thrombus of descending AA.   - Completed 9 months of AC   - hypercoag work up was negative.  - was off anticoagulation at this time.  - dopplers negative for DVT    Right lung hi grade sarcoma   - [place]  [place] Spar[place] [facility] [place] [facility] [place]  s/p  R thoracotomy  R pneumonectomy  resection of 9.4 cm tumor   - S/P 4 cycles AIM chemotherapy LD 5/2023   - S/P adjuvant RT to the right lung LD 9/1/23   -  current scans are CELESTINA  - S/P R pneumonectomy   	  WBC 21.39 likely due to steroids   trending down - 15,000  monitor    [place] and [place] [facility] [place] [facility] [place]

## 2024-10-27 NOTE — PROGRESS NOTE ADULT - NS ATTEND AMEND GEN_ALL_CORE FT
NSGY Attg:    see above    patient seen and examined    post op CT reviewed    agree with above    continue supportive care per ICU
NSGY Attg:    see above    patient seen and examined    repeat CT reviewed/stable    agree with above    continue supportive care per ICU  anticipate repeat conventional angio tomorrow

## 2024-10-28 ENCOUNTER — APPOINTMENT (OUTPATIENT)
Dept: NEUROLOGY | Facility: HOSPITAL | Age: 59
End: 2024-10-28

## 2024-10-28 ENCOUNTER — TRANSCRIPTION ENCOUNTER (OUTPATIENT)
Age: 59
End: 2024-10-28

## 2024-10-28 LAB
ANION GAP SERPL CALC-SCNC: 9 MMOL/L — SIGNIFICANT CHANGE UP (ref 5–17)
APTT BLD: 26 SEC — SIGNIFICANT CHANGE UP (ref 24.5–35.6)
BLD GP AB SCN SERPL QL: SIGNIFICANT CHANGE UP
BUN SERPL-MCNC: 16.2 MG/DL — SIGNIFICANT CHANGE UP (ref 8–20)
CALCIUM SERPL-MCNC: 8.8 MG/DL — SIGNIFICANT CHANGE UP (ref 8.4–10.5)
CHLORIDE SERPL-SCNC: 104 MMOL/L — SIGNIFICANT CHANGE UP (ref 96–108)
CO2 SERPL-SCNC: 27 MMOL/L — SIGNIFICANT CHANGE UP (ref 22–29)
CREAT SERPL-MCNC: 0.69 MG/DL — SIGNIFICANT CHANGE UP (ref 0.5–1.3)
EGFR: 106 ML/MIN/1.73M2 — SIGNIFICANT CHANGE UP
GLUCOSE BLDC GLUCOMTR-MCNC: 108 MG/DL — HIGH (ref 70–99)
GLUCOSE BLDC GLUCOMTR-MCNC: 136 MG/DL — HIGH (ref 70–99)
GLUCOSE BLDC GLUCOMTR-MCNC: 185 MG/DL — HIGH (ref 70–99)
GLUCOSE SERPL-MCNC: 128 MG/DL — HIGH (ref 70–99)
HCT VFR BLD CALC: 33.1 % — LOW (ref 39–50)
HGB BLD-MCNC: 11.1 G/DL — LOW (ref 13–17)
INR BLD: 1.15 RATIO — SIGNIFICANT CHANGE UP (ref 0.85–1.16)
MAGNESIUM SERPL-MCNC: 2.2 MG/DL — SIGNIFICANT CHANGE UP (ref 1.6–2.6)
MCHC RBC-ENTMCNC: 28.5 PG — SIGNIFICANT CHANGE UP (ref 27–34)
MCHC RBC-ENTMCNC: 33.5 GM/DL — SIGNIFICANT CHANGE UP (ref 32–36)
MCV RBC AUTO: 85.1 FL — SIGNIFICANT CHANGE UP (ref 80–100)
PHOSPHATE SERPL-MCNC: 2.6 MG/DL — SIGNIFICANT CHANGE UP (ref 2.4–4.7)
PLATELET # BLD AUTO: 281 K/UL — SIGNIFICANT CHANGE UP (ref 150–400)
POTASSIUM SERPL-MCNC: 4.4 MMOL/L — SIGNIFICANT CHANGE UP (ref 3.5–5.3)
POTASSIUM SERPL-SCNC: 4.4 MMOL/L — SIGNIFICANT CHANGE UP (ref 3.5–5.3)
PROTHROM AB SERPL-ACNC: 13 SEC — SIGNIFICANT CHANGE UP (ref 9.9–13.4)
RBC # BLD: 3.89 M/UL — LOW (ref 4.2–5.8)
RBC # FLD: 14.6 % — HIGH (ref 10.3–14.5)
SODIUM SERPL-SCNC: 140 MMOL/L — SIGNIFICANT CHANGE UP (ref 135–145)
WBC # BLD: 15.79 K/UL — HIGH (ref 3.8–10.5)
WBC # FLD AUTO: 15.79 K/UL — HIGH (ref 3.8–10.5)

## 2024-10-28 PROCEDURE — 36226 PLACE CATH VERTEBRAL ART: CPT | Mod: LT,58

## 2024-10-28 PROCEDURE — 36227 PLACE CATH XTRNL CAROTID: CPT | Mod: 50

## 2024-10-28 PROCEDURE — 76377 3D RENDER W/INTRP POSTPROCES: CPT | Mod: 26

## 2024-10-28 PROCEDURE — 99223 1ST HOSP IP/OBS HIGH 75: CPT | Mod: GC

## 2024-10-28 PROCEDURE — 99223 1ST HOSP IP/OBS HIGH 75: CPT

## 2024-10-28 PROCEDURE — 36224 PLACE CATH CAROTD ART: CPT | Mod: 50,58

## 2024-10-28 RX ORDER — LABETALOL HCL 200 MG
10 TABLET ORAL
Refills: 0 | Status: DISCONTINUED | OUTPATIENT
Start: 2024-10-28 | End: 2024-10-30

## 2024-10-28 RX ORDER — HYDRALAZINE HYDROCHLORIDE 50 MG/1
10 TABLET, FILM COATED ORAL
Refills: 0 | Status: DISCONTINUED | OUTPATIENT
Start: 2024-10-28 | End: 2024-10-30

## 2024-10-28 RX ORDER — SENNA 187 MG
2 TABLET ORAL
Qty: 0 | Refills: 0 | DISCHARGE
Start: 2024-10-28

## 2024-10-28 RX ORDER — ACETAMINOPHEN 500 MG
2 TABLET ORAL
Qty: 0 | Refills: 0 | DISCHARGE
Start: 2024-10-28

## 2024-10-28 RX ORDER — ENOXAPARIN SODIUM 40MG/0.4ML
40 SYRINGE (ML) SUBCUTANEOUS EVERY 24 HOURS
Refills: 0 | Status: DISCONTINUED | OUTPATIENT
Start: 2024-10-28 | End: 2024-10-30

## 2024-10-28 RX ORDER — PANTOPRAZOLE SODIUM 40 MG/1
1 TABLET, DELAYED RELEASE ORAL
Qty: 0 | Refills: 0 | DISCHARGE
Start: 2024-10-28

## 2024-10-28 RX ORDER — DIBASIC SODIUM PHOSPHATE, MONOBASIC POTASSIUM PHOSPHATE AND MONOBASIC SODIUM PHOSPHATE 852; 155; 130 MG/1; MG/1; MG/1
1 TABLET ORAL ONCE
Refills: 0 | Status: COMPLETED | OUTPATIENT
Start: 2024-10-28 | End: 2024-10-28

## 2024-10-28 RX ORDER — LEVETIRACETAM 500 MG/1
1500 TABLET, FILM COATED ORAL
Qty: 0 | Refills: 0 | DISCHARGE
Start: 2024-10-28

## 2024-10-28 RX ORDER — OXYCODONE HYDROCHLORIDE 30 MG/1
1 TABLET ORAL
Qty: 0 | Refills: 0 | DISCHARGE
Start: 2024-10-28

## 2024-10-28 RX ORDER — OXYCODONE HYDROCHLORIDE 30 MG/1
2.5 TABLET ORAL
Qty: 0 | Refills: 0 | DISCHARGE
Start: 2024-10-28

## 2024-10-28 RX ADMIN — LEVETIRACETAM 1500 MILLIGRAM(S): 500 TABLET, FILM COATED ORAL at 13:11

## 2024-10-28 RX ADMIN — CHLORHEXIDINE GLUCONATE 1 APPLICATION(S): 40 SOLUTION TOPICAL at 05:53

## 2024-10-28 RX ADMIN — LEVETIRACETAM 1500 MILLIGRAM(S): 500 TABLET, FILM COATED ORAL at 00:53

## 2024-10-28 RX ADMIN — DEXAMETHASONE 1.5 MG 4 MILLIGRAM(S): 1.5 TABLET ORAL at 13:11

## 2024-10-28 RX ADMIN — DEXAMETHASONE 1.5 MG 4 MILLIGRAM(S): 1.5 TABLET ORAL at 00:52

## 2024-10-28 RX ADMIN — DEXAMETHASONE 1.5 MG 4 MILLIGRAM(S): 1.5 TABLET ORAL at 17:28

## 2024-10-28 RX ADMIN — PANTOPRAZOLE SODIUM 40 MILLIGRAM(S): 40 TABLET, DELAYED RELEASE ORAL at 13:10

## 2024-10-28 RX ADMIN — Medication 2 TABLET(S): at 22:18

## 2024-10-28 RX ADMIN — Medication 40 MILLIGRAM(S): at 22:16

## 2024-10-28 RX ADMIN — POLYETHYLENE GLYCOL 3350 17 GRAM(S): 17 POWDER, FOR SOLUTION ORAL at 13:10

## 2024-10-28 RX ADMIN — DEXAMETHASONE 1.5 MG 4 MILLIGRAM(S): 1.5 TABLET ORAL at 05:52

## 2024-10-28 RX ADMIN — Medication 2: at 16:41

## 2024-10-28 RX ADMIN — DIBASIC SODIUM PHOSPHATE, MONOBASIC POTASSIUM PHOSPHATE AND MONOBASIC SODIUM PHOSPHATE 1 PACKET(S): 852; 155; 130 TABLET ORAL at 15:12

## 2024-10-28 NOTE — CONSULT NOTE ADULT - SUBJECTIVE AND OBJECTIVE BOX
Patient is a 59y old  Male who presents with a chief complaint of ICH (28 Oct 2024 09:42)      HPI:   58 yo M with PMHX of lung cancer (diagnosed 2021) s/p R lobectomy ([facility] 2022) and HLD  p/w migraine and aphasia starting 4:30 pm 10/21/2024  followed by new onset R facial droop  and R sided weakness starting morning 10/22/2024. Wife states she gave one tablet of baby ASA around 4:30 pm the day prior for migraine. Patient denies daily use of AC/AP. Denies trauma. NIHSS 7 upon admission. 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 Patient on cardene in ED, transferred to NeuroICU for monitoring.   Patient underwent MRI w/wo which showed likely mass but unable to r/o underlying vascular abnormality. He underwent cerebral angiogram to assess vasculature on 10/24/24 prior to OR which was negative for any underlying vascular abnormality. Patient went to the OR on friday 10/25/24 for mass resection with concerns for possible AVM. Neuro IR was reconsulted by Neurosurgery who recommended repeating cerebral angiogram, planned for 10/28/24 with Dr. Pitts.     Interval History:        PAST MEDICAL & SURGICAL HISTORY:  Lung cancer diagnosed in 2021, s/p R Lobectomy- follows with [facility]  HLD       Social History:  patient lives with wife in ranch home. Denies hx of smoking, or illicit drug use. Notes occasional recreational etoh use.      FAMILY HISTORY:      Allergies  No Known Allergies      HOME MEDICATIONS :   rosuvastatin 10 mg oral tablet: 1 tab(s) orally once a day ** Patient not actively taking ** (22 Oct 2024 11:54)      REVIEW OF SYSTEMS:    CONSTITUTIONAL: No weakness, fevers or chills  EYES/ENT: No visual changes;  No vertigo or throat pain   NECK: No pain or stiffness  RESPIRATORY: No cough, wheezing, hemoptysis; No shortness of breath  CARDIOVASCULAR: No chest pain or palpitations  GASTROINTESTINAL: No abdominal or epigastric pain. No nausea, vomiting   GENITOURINARY: No dysuria, frequency or hematuria  NEUROLOGICAL: No numbness or weakness      MEDICATIONS  (STANDING):  chlorhexidine 2% Cloths 1 Application(s) Topical <User Schedule>  dexAMETHasone  Injectable 4 milliGRAM(s) IV Push every 6 hours  dexAMETHasone  Injectable   IV Push   dextrose 50% Injectable 25 Gram(s) IV Push once  dextrose 50% Injectable 12.5 Gram(s) IV Push once  dextrose 50% Injectable 25 Gram(s) IV Push once  enoxaparin Injectable 40 milliGRAM(s) SubCutaneous every 24 hours  insulin lispro (ADMELOG) corrective regimen sliding scale   SubCutaneous three times a day before meals  levETIRAcetam   Injectable 1500 milliGRAM(s) IV Push every 12 hours  pantoprazole    Tablet 40 milliGRAM(s) Oral daily  polyethylene glycol 3350 17 Gram(s) Oral daily  potassium phosphate / sodium phosphate Powder (PHOS-NaK) 1 Packet(s) Oral once  senna 2 Tablet(s) Oral at bedtime    MEDICATIONS  (PRN):  acetaminophen     Tablet .. 650 milliGRAM(s) Oral every 6 hours PRN Mild Pain (1 - 3)  benzocaine/menthol Lozenge 1 Lozenge Oral every 6 hours PRN Sore Throat  hydrALAZINE Injectable 10 milliGRAM(s) IV Push every 2 hours PRN SBP>160  labetalol Injectable 10 milliGRAM(s) IV Push every 2 hours PRN SBP>160  ondansetron Injectable 4 milliGRAM(s) IV Push every 6 hours PRN Nausea and/or Vomiting  oxyCODONE    IR 5 milliGRAM(s) Oral every 4 hours PRN Moderate Pain (4 - 6)  oxyCODONE    IR 10 milliGRAM(s) Oral every 4 hours PRN Severe Pain (7 - 10)      Vital Signs Last 24 Hrs  T(C): 36.7 (28 Oct 2024 08:19), Max: 36.8 (27 Oct 2024 20:00)  T(F): 98.1 (28 Oct 2024 08:19), Max: 98.3 (27 Oct 2024 20:00)  HR: 66 (28 Oct 2024 08:00) (66 - 93)  BP: 120/78 (28 Oct 2024 08:00) (111/77 - 147/94)  BP(mean): 91 (28 Oct 2024 08:00) (86 - 109)  RR: 18 (28 Oct 2024 08:00) (18 - 23)  SpO2: 96% (28 Oct 2024 08:00) (95% - 99%)    Parameters below as of 28 Oct 2024 04:00   Patient On (Oxygen Delivery Method): room air         PHYSICAL EXAM:     CONSTITUTIONAL: Awake, alert and in no apparent distress   CARDIAC: Normal rate, regular rhythm.  Heart sounds S1, S2.   RESPIRATORY: Breath sounds clear and equal bilaterally.   ABDOMINAL: Nontender to palpation. No rebound/ guarding   EXTREMITIES: No edema, cyanosis or deformity   NEUROLOGICAL: Alert and oriented, no focal deficits       LABS:                         11.1   15.79 )-----------( 281      ( 28 Oct 2024 04:25 )             33.1     10-28    140  |  104  |  16.2  ----------------------------<  128[H]  4.4   |  27.0  |  0.69    Ca    8.8      28 Oct 2024 04:25  Phos  2.6     10-28  Mg     2.2     10-28      PT/INR - ( 28 Oct 2024 04:25 )   PT: 13.0 sec;   INR: 1.15 ratio         PTT - ( 28 Oct 2024 04:25 )  PTT:26.0 sec  Urinalysis Basic - ( 28 Oct 2024 04:25 )    Color: x / Appearance: x / SG: x / pH: x  Gluc: 128 mg/dL / Ketone: x  / Bili: x / Urobili: x   Blood: x / Protein: x / Nitrite: x   Leuk Esterase: x / RBC: x / WBC x   Sq Epi: x / Non Sq Epi: x / Bacteria: x       Patient is a 59y old  Male who presents with a chief complaint of ICH (28 Oct 2024 09:42)      HPI:   58 yo M with PMHX of lung cancer (diagnosed 2021) s/p R lobectomy ([facility] 2022) and HLD  p/w migraine and aphasia starting 4:30 pm 10/21/2024  followed by new onset R facial droop  and R sided weakness starting morning 10/22/2024. Wife states she gave one tablet of baby ASA around 4:30 pm the day prior for migraine. Patient denies daily use of AC/AP. Denies trauma. NIHSS 7 upon admission. 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 Patient on cardene in ED, transferred to NeuroICU for monitoring.   Patient underwent MRI w/wo which showed likely mass but unable to r/o underlying vascular abnormality. He underwent cerebral angiogram to assess vasculature on 10/24/24 prior to OR which was negative for any underlying vascular abnormality. Patient went to the OR on friday 10/25/24 for mass resection with concerns for possible AVM. Neuro IR was reconsulted by Neurosurgery who recommended repeating cerebral angiogram, planned for 10/28/24 with Dr. Pitts.     Interval History:  Pt with expressive aphasia   Is non verbal   Nods/shakes head to Qs       PAST MEDICAL & SURGICAL HISTORY:  Lung cancer diagnosed in 2021, s/p R Lobectomy- follows with [facility]  HLD       Social History:  patient lives with wife in ranch home. Denies hx of smoking, or illicit drug use. Notes occasional recreational etoh use.      FAMILY HISTORY:  Non contributory     Allergies  No Known Allergies      HOME MEDICATIONS :   rosuvastatin 10 mg oral tablet: 1 tab(s) orally once a day ** Patient not actively taking ** (22 Oct 2024 11:54)      REVIEW OF SYSTEMS:    Unable to perform ROS as pt is non verbal       MEDICATIONS  (STANDING):  chlorhexidine 2% Cloths 1 Application(s) Topical <User Schedule>  dexAMETHasone  Injectable 4 milliGRAM(s) IV Push every 6 hours  dexAMETHasone  Injectable   IV Push   dextrose 50% Injectable 25 Gram(s) IV Push once  dextrose 50% Injectable 12.5 Gram(s) IV Push once  dextrose 50% Injectable 25 Gram(s) IV Push once  enoxaparin Injectable 40 milliGRAM(s) SubCutaneous every 24 hours  insulin lispro (ADMELOG) corrective regimen sliding scale   SubCutaneous three times a day before meals  levETIRAcetam   Injectable 1500 milliGRAM(s) IV Push every 12 hours  pantoprazole    Tablet 40 milliGRAM(s) Oral daily  polyethylene glycol 3350 17 Gram(s) Oral daily  potassium phosphate / sodium phosphate Powder (PHOS-NaK) 1 Packet(s) Oral once  senna 2 Tablet(s) Oral at bedtime    MEDICATIONS  (PRN):  acetaminophen     Tablet .. 650 milliGRAM(s) Oral every 6 hours PRN Mild Pain (1 - 3)  benzocaine/menthol Lozenge 1 Lozenge Oral every 6 hours PRN Sore Throat  hydrALAZINE Injectable 10 milliGRAM(s) IV Push every 2 hours PRN SBP>160  labetalol Injectable 10 milliGRAM(s) IV Push every 2 hours PRN SBP>160  ondansetron Injectable 4 milliGRAM(s) IV Push every 6 hours PRN Nausea and/or Vomiting  oxyCODONE    IR 5 milliGRAM(s) Oral every 4 hours PRN Moderate Pain (4 - 6)  oxyCODONE    IR 10 milliGRAM(s) Oral every 4 hours PRN Severe Pain (7 - 10)      Vital Signs Last 24 Hrs  T(C): 36.7 (28 Oct 2024 08:19), Max: 36.8 (27 Oct 2024 20:00)  T(F): 98.1 (28 Oct 2024 08:19), Max: 98.3 (27 Oct 2024 20:00)  HR: 66 (28 Oct 2024 08:00) (66 - 93)  BP: 120/78 (28 Oct 2024 08:00) (111/77 - 147/94)  BP(mean): 91 (28 Oct 2024 08:00) (86 - 109)  RR: 18 (28 Oct 2024 08:00) (18 - 23)  SpO2: 96% (28 Oct 2024 08:00) (95% - 99%)    Parameters below as of 28 Oct 2024 04:00   Patient On (Oxygen Delivery Method): room air         PHYSICAL EXAM:     CONSTITUTIONAL: Awake, alert and in no apparent distress   CARDIAC: Normal rate, regular rhythm.  Heart sounds S1, S2.   RESPIRATORY: Breath sounds clear and equal bilaterally.   ABDOMINAL: Nontender to palpation. No rebound/ guarding   EXTREMITIES: No edema, cyanosis or deformity   NEUROLOGICAL: Expressive aphasia, R sided weakness compared to L       LABS:                         11.1   15.79 )-----------( 281      ( 28 Oct 2024 04:25 )             33.1     10-28    140  |  104  |  16.2  ----------------------------<  128[H]  4.4   |  27.0  |  0.69    Ca    8.8      28 Oct 2024 04:25  Phos  2.6     10-28  Mg     2.2     10-28      PT/INR - ( 28 Oct 2024 04:25 )   PT: 13.0 sec;   INR: 1.15 ratio         PTT - ( 28 Oct 2024 04:25 )  PTT:26.0 sec  Urinalysis Basic - ( 28 Oct 2024 04:25 )    Color: x / Appearance: x / SG: x / pH: x  Gluc: 128 mg/dL / Ketone: x  / Bili: x / Urobili: x   Blood: x / Protein: x / Nitrite: x   Leuk Esterase: x / RBC: x / WBC x   Sq Epi: x / Non Sq Epi: x / Bacteria: x

## 2024-10-28 NOTE — DISCHARGE NOTE PROVIDER - HOSPITAL COURSE
This patient is a 59yM w/ PMHx of lung cancer (2021) [place] [facility] [place] followed by R facial and R sided weakness. 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 Pt underwent 2 angiograms during his stay both of which were negative. Patient did have a concern for seizures prior to OR so Keppra was increased to 1.5g BID. Patient's post op course has been uncomplicated. Had a slight worsening in RUE weakness but has been improving. PT recommending acute rehab. Patient is ready for discharge to acute rehab. This patient is a 59yM w/ PMHx of lung cancer (2021) [place] [facility] [place] and aphasia followed by R facial and R sided weakness. 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 Pt underwent 2 angiograms during his stay both of which were negative. Patient did have a concern for seizures prior to OR so Keppra was increased to 1.5g BID. Patient's post op course has been uncomplicated. Had a slight worsening in RUE weakness but has been improving. PT recommending acute rehab. Patient is ready for discharge to acute rehab.

## 2024-10-28 NOTE — CONSULT NOTE ADULT - CONSULT REQUESTED DATE/TIME
25-Oct-2024 16:18
22-Oct-2024 10:24
22-Oct-2024 11:39
23-Oct-2024 11:23
28-Oct-2024
28-Oct-2024 17:13
22-Oct-2024 10:59

## 2024-10-28 NOTE — CHART NOTE - NSCHARTNOTEFT_GEN_A_CORE
Neurointerventional Surgery  Pre-Procedure Note     This is a 59y ____ hand dominant Male    HPI:  58 yo M with PMHX of lung cancer(diagnosed 2021) s/p R lobectomy ([facility] 2022) and HLD  p/w migrane and aphasia starting 4:30 pm 10/21/2024  followed by new onset R facial droop  and R sided weakness starting this morning 10/22/2024. Wife states she gave one tablet of baby ASA around 4:30 pm yesterdat for migrane. Patient denies daily use of AC/AP. Denies trauma. NIHSS 7 upon admission. 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 Patient on cardene in ED, transferred to NeuroICU for monitoring.     Patient underwent MRI w/wo which showed likely mass but unable to r/o underlying vascular abnormality. He underwent cerebral angiogram to assess vasculature on 10/24/24 prior to OR which was negative for any underlying vascular abnormality. Patient went to the OR on friday 10/25/24 for mass resection with concerns for possible AVM. Neuro IR was reconsulted by Neurosurgery who recommended repeating cerebral angiogram for which he presents today 10/28/24 with Dr. Pitts.     PMHx:   Lung cancer diagnosed in 2021, s/p R Lobectomy- follows with [facility]  HLD: admits noncompliant with medication    Allergies:   NKDA (22 Oct 2024 10:26)    Social History:   Social History:  patient lives with wife in ranch home. Denies hx of smoking, or illiciit drug use. Notes occasional recreational etoh use. (22 Oct 2024 10:26)    FAMILY HISTORY:      Current Medications:   acetaminophen     Tablet .. 650 milliGRAM(s) Oral every 6 hours PRN  benzocaine/menthol Lozenge 1 Lozenge Oral every 6 hours PRN  chlorhexidine 2% Cloths 1 Application(s) Topical <User Schedule>  dexAMETHasone  Injectable 4 milliGRAM(s) IV Push every 6 hours  dexAMETHasone  Injectable   IV Push   dextrose 50% Injectable 12.5 Gram(s) IV Push once  dextrose 50% Injectable 25 Gram(s) IV Push once  dextrose 50% Injectable 25 Gram(s) IV Push once  hydrALAZINE Injectable 10 milliGRAM(s) IV Push every 2 hours PRN  insulin lispro (ADMELOG) corrective regimen sliding scale   SubCutaneous three times a day before meals  labetalol Injectable 10 milliGRAM(s) IV Push every 2 hours PRN  levETIRAcetam   Injectable 1500 milliGRAM(s) IV Push every 12 hours  ondansetron Injectable 4 milliGRAM(s) IV Push every 6 hours PRN  oxyCODONE    IR 10 milliGRAM(s) Oral every 4 hours PRN  oxyCODONE    IR 5 milliGRAM(s) Oral every 4 hours PRN  pantoprazole    Tablet 40 milliGRAM(s) Oral daily  polyethylene glycol 3350 17 Gram(s) Oral daily  senna 2 Tablet(s) Oral at bedtime      Physical Exam:  Constitutional: NAD, lying in bed  Neuro  * Mental Status: Awake, alert, nodding head appropriately, + expressive aphasia, following commands   * Cranial Nerves: Facial droop, PERRL, EOMI, tongue midline, no gaze deviation  * Motor: RUE 4/5, LUE 5/5, RLE 5/5, LLE 5/5, no drift or dysmetria  * Sensory: Sensation intact to light touch  * Reflexes: not assessed   Cardiovascular: Regular rate and rhythm.  Eyes: See neurologic examination with detailed examination of eyes.  ENT: No JVD, Trachea Midline  Respiratory: non labored breathing   Gastrointestinal: Soft, nontender, nondistended.  Genitourinary: [ ] Tidwell, [ x ] No Tidwell.   Musculoskeletal: No muscle wasting noted, (See neurologic assessment for full muscle strength assessment)   Skin: surgical site C/D/I   Hematologic / Lymph / Immunologic: No bleeding from IV sites or wounds    NIH SS:  DATE: 10/28/24   TIME:  1A: Level of consciousness (0-3): 0  1B: Questions (0-2): 0    1C: Commands (0-2): 0  2: Gaze (0-2): 0  3: Visual fields (0-3): 0  4: Facial palsy (0-3): 0  MOTOR:  5A: Left arm motor drift (0-4): 0  5B: Right arm motor drift (0-4): 0  6A: Left leg motor drift (0-4): 0  6B: Right leg motor drift (0-4): 0  7: Limb ataxia (0-2): 0  SENSORY:  8: Sensation (0-2): 0  SPEECH:  9: Language (0-3): 0  10: Dysarthria (0-2): 0  EXTINCTION:  11: Extinction/inattention (0-2): 0    TOTAL SCORE:     Labs:                         11.1   15.79 )-----------( 281      ( 28 Oct 2024 04:25 )             33.1       10-28    140  |  104  |  16.2  ----------------------------<  128[H]  4.4   |  27.0  |  0.69    Ca    8.8      28 Oct 2024 04:25  Phos  2.6     10-28  Mg     2.2     10-28      PT/INR - ( 28 Oct 2024 04:25 )   PT: 13.0 sec;   INR: 1.15 ratio    PTT - ( 28 Oct 2024 04:25 )  PTT:26.0 sec    Assessment/Plan:   This is a 59y  year old Male  presents with L frontal parietal IPH s/p resection for underlying mass, r/o possible AVM. Patient presents to neuro-IR for selective cerebral angiography.     Procedure, goals, risks, benefits and alternatives  were discussed with patiens wife. All questions were answered.  Risks include but are not limited to stroke, vessel injury, hemorrhage, and or groin hematoma.  Patient demonstrates understanding  of all risks involved with this procedure and wishes to continue.   Appropriate  consent was obtained from patient and consent is in the patient's chart. Neurointerventional Surgery  Pre-Procedure Note     This is a 59y ____ hand dominant Male    HPI:  60 yo M with PMHX of lung cancer(diagnosed 2021) s/p R lobectomy ([facility] 2022) and HLD  p/w migrane and aphasia starting 4:30 pm 10/21/2024  followed by new onset R facial droop  and R sided weakness starting this morning 10/22/2024. Wife states she gave one tablet of baby ASA around 4:30 pm yesterdat for migrane. Patient denies daily use of AC/AP. Denies trauma. NIHSS 7 upon admission. 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. Patient on cardene in ED, transferred to NeuroICU for monitoring.     Patient underwent MRI w/wo which showed likely mass but unable to r/o underlying vascular abnormality. He underwent cerebral angiogram to assess vasculature on 10/24/24 prior to OR which was negative for any underlying vascular abnormality. Patient went to the OR on friday 10/25/24 for mass resection with concerns for possible AVM. Neuro IR was reconsulted by Neurosurgery who recommended repeating cerebral angiogram for which he presents today 10/28/24 with Dr. Pitts.     PMHx:   Lung cancer diagnosed in 2021, s/p R Lobectomy- follows with [facility]  HLD: admits noncompliant with medication    Allergies:   NKDA (22 Oct 2024 10:26)    Social History:   Social History:  patient lives with wife in ranch home. Denies hx of smoking, or illiciit drug use. Notes occasional recreational etoh use. (22 Oct 2024 10:26)    FAMILY HISTORY:      Current Medications:   acetaminophen     Tablet .. 650 milliGRAM(s) Oral every 6 hours PRN  benzocaine/menthol Lozenge 1 Lozenge Oral every 6 hours PRN  chlorhexidine 2% Cloths 1 Application(s) Topical <User Schedule>  dexAMETHasone  Injectable 4 milliGRAM(s) IV Push every 6 hours  dexAMETHasone  Injectable   IV Push   dextrose 50% Injectable 12.5 Gram(s) IV Push once  dextrose 50% Injectable 25 Gram(s) IV Push once  dextrose 50% Injectable 25 Gram(s) IV Push once  hydrALAZINE Injectable 10 milliGRAM(s) IV Push every 2 hours PRN  insulin lispro (ADMELOG) corrective regimen sliding scale   SubCutaneous three times a day before meals  labetalol Injectable 10 milliGRAM(s) IV Push every 2 hours PRN  levETIRAcetam   Injectable 1500 milliGRAM(s) IV Push every 12 hours  ondansetron Injectable 4 milliGRAM(s) IV Push every 6 hours PRN  oxyCODONE    IR 10 milliGRAM(s) Oral every 4 hours PRN  oxyCODONE    IR 5 milliGRAM(s) Oral every 4 hours PRN  pantoprazole    Tablet 40 milliGRAM(s) Oral daily  polyethylene glycol 3350 17 Gram(s) Oral daily  senna 2 Tablet(s) Oral at bedtime      Physical Exam:  Constitutional: NAD, lying in bed  Neuro  * Mental Status: Awake, alert, nodding head appropriately, + expressive aphasia, following commands   * Cranial Nerves: Facial droop, PERRL, EOMI, tongue midline, no gaze deviation  * Motor: RUE 4/5, LUE 5/5, RLE 5/5, LLE 5/5, no drift or dysmetria  * Sensory: Sensation intact to light touch  * Reflexes: not assessed   Cardiovascular: Regular rate and rhythm.  Eyes: See neurologic examination with detailed examination of eyes.  ENT: No JVD, Trachea Midline  Respiratory: non labored breathing   Gastrointestinal: Soft, nontender, nondistended.  Genitourinary: [ ] Tidwell, [ x ] No Tidwell.   Musculoskeletal: No muscle wasting noted, (See neurologic assessment for full muscle strength assessment)   Skin: surgical site C/D/I   Hematologic / Lymph / Immunologic: No bleeding from IV sites or wounds    NIH SS:  DATE: 10/28/24   TIME:  1A: Level of consciousness (0-3): 0  1B: Questions (0-2): 0    1C: Commands (0-2): 0  2: Gaze (0-2): 0  3: Visual fields (0-3): 0  4: Facial palsy (0-3): 0  MOTOR:  5A: Left arm motor drift (0-4): 0  5B: Right arm motor drift (0-4): 0  6A: Left leg motor drift (0-4): 0  6B: Right leg motor drift (0-4): 0  7: Limb ataxia (0-2): 0  SENSORY:  8: Sensation (0-2): 0  SPEECH:  9: Language (0-3): 0  10: Dysarthria (0-2): 0  EXTINCTION:  11: Extinction/inattention (0-2): 0    TOTAL SCORE:     Labs:                         11.1   15.79 )-----------( 281      ( 28 Oct 2024 04:25 )             33.1       10-28    140  |  104  |  16.2  ----------------------------<  128[H]  4.4   |  27.0  |  0.69    Ca    8.8      28 Oct 2024 04:25  Phos  2.6     10-28  Mg     2.2     10-28      PT/INR - ( 28 Oct 2024 04:25 )   PT: 13.0 sec;   INR: 1.15 ratio    PTT - ( 28 Oct 2024 04:25 )  PTT:26.0 sec    Assessment/Plan:   This is a 59y  year old Male  presents with L frontal posterior ICH s/p resection for underlying mass, r/o possible AVM. Patient presents to neuro-IR for selective cerebral angiography.     Procedure, goals, risks, benefits and alternatives  were discussed with patiens wife. All questions were answered.  Risks include but are not limited to stroke, vessel injury, hemorrhage, and or groin hematoma.  Patient demonstrates understanding  of all risks involved with this procedure and wishes to continue.   Appropriate  consent was obtained from patient and consent is in the patient's chart.

## 2024-10-28 NOTE — DISCHARGE NOTE PROVIDER - NSDCCPTREATMENT_GEN_ALL_CORE_FT
PRINCIPAL PROCEDURE  Procedure: Craniotomy, for lesion excision  Findings and Treatment: L craniotomy for L frontal brain lesion resection (possible AVM resection)

## 2024-10-28 NOTE — PROGRESS NOTE ADULT - SUBJECTIVE AND OBJECTIVE BOX
SUBJECTIVE:     INTERVAL HX/OVERNIGHT EVENTS:  Patient seen and examined at bedside. Denies chest pain, shortness of breath, nausea/vomiting. Last BM:     Vital Signs Last 24 Hrs  T(C): 36.7 (10-28-24 @ 08:19), Max: 36.8 (10-27-24 @ 20:00)  T(F): 98.1 (10-28-24 @ 08:19), Max: 98.3 (10-27-24 @ 20:00)  HR: 66 (10-28-24 @ 08:00) (66 - 93)  BP: 120/78 (10-28-24 @ 08:00) (111/77 - 147/94)  BP(mean): 91 (10-28-24 @ 08:00) (86 - 109)  RR: 18 (10-28-24 @ 08:00) (17 - 23)  SpO2: 96% (10-28-24 @ 08:00) (95% - 99%)    PHYSICAL EXAM:    GENERAL: NAD    INCISION: Left incision site, no signs of infection     DRAINS:    HEAD: normocephalic   MENTAL STATUS: AAO x 3, conversant, following simple commands    CRANIAL NERVES: PERRL, EOMI without nystagmus. Face symmetric, Tongue is midline. Hearing grossly intact. Head turning and shoulder shrug intact.    REFLEXES: No pronator drift   MOTOR: strength 5/5 b/l upper and lower extremities   SENSATION: grossly intact to light touch all extremities   SKIN: Warm, dry    LABS:                          11.1   15.79 )-----------( 281      ( 28 Oct 2024 04:25 )             33.1    10-28    140  |  104  |  16.2  ----------------------------<  128[H]  4.4   |  27.0  |  0.69    Ca    8.8      28 Oct 2024 04:25  Phos  2.6     10-28  Mg     2.2     10-28    PT/INR - ( 28 Oct 2024 04:25 )   PT: 13.0 sec;   INR: 1.15 ratio         PTT - ( 28 Oct 2024 04:25 )  PTT:26.0 sec    10-27 @ 07:01  -  10-28 @ 07:00  --------------------------------------------------------  IN: 400 mL / OUT: 750 mL / NET: -350 mL        IMAGING:   CT Head No Cont (10.27.24 @ 05:08)   IMPRESSION:    No significant interval change from CT brain 10/26/2024    MR Head w/wo IV Cont (10.26.24 @ 08:54)     IMPRESSION:    Status post craniotomy for resection of left frontal hemorrhagic lesion.   Postoperative MRI demonstrates both T1 hyperintense hemorrhage signal and   nonspecific enhancement at the intra-axial surgical site for which   follow-up is necessary to determine if this reflects postop change or   potential neoplasia. Please correlate with surgical pathology.   Surrounding edema is not significantly changed.    Postoperative extra-axial partly hemorrhagic collection 9 mm width   contributes to mild midline shift to right and this can be monitored on   serial post-op CT. No hydrocephalus or downward herniation.    New scattered cortical infarcts on DWI in the right cerebral hemisphere.   Correlate for a new left-sided deficit.                 SUBJECTIVE:   59yM w/ PMHx of lung cancer (2021) s/p R lobectomy transfer from [facility] p/w migrane and aphasia followed by R facial and R sided weakness starting 4:30 PM. 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    INTERVAL HX/OVERNIGHT EVENTS:  Patient seen and examined at bedside. Denies any acute complaints. Angio today.  Last BM: 10/26    Vital Signs Last 24 Hrs  T(C): 36.7 (10-28-24 @ 08:19), Max: 36.8 (10-27-24 @ 20:00)  T(F): 98.1 (10-28-24 @ 08:19), Max: 98.3 (10-27-24 @ 20:00)  HR: 66 (10-28-24 @ 08:00) (66 - 93)  BP: 120/78 (10-28-24 @ 08:00) (111/77 - 147/94)  BP(mean): 91 (10-28-24 @ 08:00) (86 - 109)  RR: 18 (10-28-24 @ 08:00) (17 - 23)  SpO2: 96% (10-28-24 @ 08:00) (95% - 99%)    PHYSICAL EXAM:    GENERAL: NAD    INCISION: Left incision site, staples in place, intact  HEAD: normocephalic   MENTAL STATUS: AAO x 2-3 (nods appropriately severe expressive aphasia, following simple commands    CRANIAL NERVES: PERRL, EOMI without nystagmus. R facial, Tongue deviated slight towards right. Hearing grossly intact. Head turning and shoulder shrug intact.  MOTOR: strength 5/5 LUE/BLE, RUE: R bi 4+/5, tri 4/5, R HG 4-/5    SENSATION: grossly intact to light touch all extremities   SKIN: Warm, dry    LABS:                          11.1   15.79 )-----------( 281      ( 28 Oct 2024 04:25 )             33.1    10-28    140  |  104  |  16.2  ----------------------------<  128[H]  4.4   |  27.0  |  0.69    Ca    8.8      28 Oct 2024 04:25  Phos  2.6     10-28  Mg     2.2     10-28    PT/INR - ( 28 Oct 2024 04:25 )   PT: 13.0 sec;   INR: 1.15 ratio         PTT - ( 28 Oct 2024 04:25 )  PTT:26.0 sec    10-27 @ 07:01  -  10-28 @ 07:00  --------------------------------------------------------  IN: 400 mL / OUT: 750 mL / NET: -350 mL        IMAGING:   CT Head No Cont (10.27.24 @ 05:08)   IMPRESSION:    No significant interval change from CT brain 10/26/2024    MR Head w/wo IV Cont (10.26.24 @ 08:54)     IMPRESSION:    Status post craniotomy for resection of left frontal hemorrhagic lesion.   Postoperative MRI demonstrates both T1 hyperintense hemorrhage signal and   nonspecific enhancement at the intra-axial surgical site for which   follow-up is necessary to determine if this reflects postop change or   potential neoplasia. Please correlate with surgical pathology.   Surrounding edema is not significantly changed.    Postoperative extra-axial partly hemorrhagic collection 9 mm width   contributes to mild midline shift to right and this can be monitored on   serial post-op CT. No hydrocephalus or downward herniation.    New scattered cortical infarcts on DWI in the right cerebral hemisphere.   Correlate for a new left-sided deficit.

## 2024-10-28 NOTE — DISCHARGE NOTE PROVIDER - NSDCMRMEDTOKEN_GEN_ALL_CORE_FT
acetaminophen 325 mg oral tablet: 2 tab(s) orally every 6 hours As needed Mild Pain (1 - 3)  levETIRAcetam 750 mg oral tablet: 1,500 milligram(s) orally 2 times a day Take 2 tabs by mouth twice a day  oxyCODONE 5 mg oral tablet: 1 tab(s) orally every 6 hours as needed for  severe pain  oxyCODONE 5 mg oral tablet: 2.5 milligram(s) orally every 6 hours as needed for  moderate pain Take 1/2 tab by mouth every 6 hours prn for moderate pain  pantoprazole 40 mg oral delayed release tablet: 1 tab(s) orally once a day  rosuvastatin 10 mg oral tablet: 1 tab(s) orally once a day ** Patient not actively taking **  senna leaf extract oral tablet: 2 tab(s) orally once a day (at bedtime) as needed for  constipation   acetaminophen 325 mg oral tablet: 2 tab(s) orally every 6 hours As needed Mild Pain (1 - 3)  levETIRAcetam 750 mg oral tablet: 1,500 milligram(s) orally 2 times a day Take 2 tabs by mouth twice a day  Medrol Dosepak 4 mg oral tablet: 1 packet(s) orally  oxyCODONE 5 mg oral tablet: 1 tab(s) orally every 6 hours as needed for  severe pain  oxyCODONE 5 mg oral tablet: 2.5 milligram(s) orally every 6 hours as needed for  moderate pain Take 1/2 tab by mouth every 6 hours prn for moderate pain  pantoprazole 40 mg oral delayed release tablet: 1 tab(s) orally once a day  rosuvastatin 10 mg oral tablet: 1 tab(s) orally once a day ** Patient not actively taking **  senna leaf extract oral tablet: 2 tab(s) orally once a day (at bedtime) as needed for  constipation

## 2024-10-28 NOTE — CONSULT NOTE ADULT - ASSESSMENT
This is a 59 year old gentleman with a pertinent history of lung cancer s/p right lobectomy ([facility] 2022) who presented with aphasia and headache started suddenly at 1630 on 10/21 and followed by right facial droop and right-sided weakness this morning. CTH demonstrated right inferior frontal IPH. No evidence of vascular anomalies on CTA h/n. ICH score of 1 (Vol >30 ml). There is a surrounding edema around the lesion with a component of an adjacent SAH. Given the history of lung cancer it would be prudent to rule out metastatic lesion. DDx includes CAA and vascular malformations.       - NC q1h   - BP <140  - MRI/MRA/MRV with and witout contrast   - CT c/a/p to evaluate for malignancy   - Will consider angiogram based on MR results     D/W Dr. Hong Rodriguez MD  NeuroIR Fellow   
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 LKW yesterday 10/21 at 4:30 when he began having a headache.  Wife gave him aspirin and she noted around 6:30PM he wasn't speaking.  This AM noted he had a facial droop so was sent to the hospital.  He was found to have a L frontal cortical IPH [place] [facility] and then transferred here for further mgmt.      On exam noted to have expressive aphasia and minimal RUE weakness.     Impression:  R frontal cortical IPH with cerebral edema and brain compression, concerning for underlying met.      PLAN:   - ICU admission  - close monitoring and frequent neurochecks for signs of neurologic deterioration   - STAT head CT for any neurologic change   - check MRI w/wo of the brain   - Neuro IR consultation   - blood pressure control with goal blood pressure under 150 per Neuro iCU  - hold all antiplatelets/anticoagulants   - Recommend CT C/A/P to eval for malignancy recurrence  - Consult oncology ([facility])  - consider restarting pharmacologic DVT prophylaxis after 24 hours, if brain imaging/exam are stable   - consult PT/OT/Speech      
60 yo M with PMHx of lung cancer (2021 ) s/p lob[place] [facility] [place]/w migrane and aphasia followed by R facial and R sided weakness starting 4:30 PM. Found to have L posterior frontal cortical ICH    Plan:  - admit to NSICU, q1 neurochecks  - repeat cthead 4-6 hours or sooner if neurological decline (around 2 pm 10/22)   - repeat 24 hour CT head from initial ct head at [facility] 2/2 Recent ASA use.   - MR brain w/ and w/o to rule out mets.   - CT CAP for further mets workup.   - Keppra 500 BID   - exam and imaging reviewed with Dr. Melendrez, recommendations as per attending. 
59 y.o. male with PMHx of lung cancer (diagnosed 2021) s/p right lobectomy ([facility] 2022), and HLD presented with migraine and expressive aphasia that started 4:30pm on 10/21/2024. Pt also had new onset right facial droop and right sided weakness that started morning of 10/22/2024. Pt took ASA 81 mg on the 21st when symptoms began. Upon admission NIH of 7 and left posterior frontal cortical ICH noted on CTH. TTE performed on 10/22/24, technically difficult study revealed severely reduced EF < 20%, no effusion or evidence of LV thrombus noted. Pt currently in NeuroICU, EEG in progress. Persistent expressive aphasia. Plan for angio with neuro team. 
59y M/F PMH lung cancer and HLD presents to [facility] right sided weakness and facial droop found with hemorrhagic lesion    - D/w attending  - Q1 neuro checks  - Angio 10/28 w/ Dr. Pitts due to concern for AVM.  - Dex 4q6  - Pain control, Avoid over sedation  - CTH 4-6hrs if not back to baseline, if at baseline CTH AM  - MRI w/wo in AM  - Continue Keppra 1500 BID  - SBP goals: <140  - RA  - NPO until dysphagia screen  - Bowell Regimen when appropriate; Senna, Miralax  - Chemical DVT ppx not ok until cleared by NSGY  - Anticoagulation/Antiplatelet therapy not ok until cleared by NSGY
60 yo M with PMHX of lung cancer (diagnosed 2021) s/p R lobectomy ([facility] 2022) and HLD  p/w migraine and aphasia starting 4:30 pm 10/21/2024  followed by new onset R facial droop  and R sided weakness starting morning 10/22/2024. Wife states she gave one tablet of baby ASA around 4:30 pm the day prior for migraine. Patient denies daily use of AC/AP. Denies trauma. NIHSS 7 upon admission. Patient found to have L posterior frontal cortical ICH, transferred to [facility] for further care (10/25/2024). Patient on cardene in ED, transferred to NeuroICU for monitoring.   Patient underwent MRI w/wo which showed likely mass but unable to r/o underlying vascular abnormality. He underwent cerebral angiogram to assess vasculature on 10/24/24 prior to OR which was negative for any underlying vascular abnormality. Patient went to the OR on friday 10/25/24 for mass resection with concerns for possible AVM. Neuro IR was reconsulted by Neurosurgery who recommended repeating cerebral angiogram, planned for 10/28/24 with Dr. Pitts.     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

## 2024-10-28 NOTE — DISCHARGE NOTE PROVIDER - PROVIDER TOKENS
PROVIDER:[TOKEN:[[id number]],FOLLOWUP:[1 week]],PROVIDER:[TOKEN:[[id number]],FOLLOWUP:[2 weeks]]

## 2024-10-28 NOTE — CHART NOTE - NSCHARTNOTEFT_GEN_A_CORE
Neurointerventional Surgery Post Procedure Note    Procedure: Selective Cerebral Angiography     Pre- Procedure Diagnosis: Left frontal posterior ICH   Post- Procedure Diagnosis:  no vascular malformation or abnormality    : Dr. Hong MD  Nurse Practitioner: Grace Marr NP   Nurse: HIGINIO Guadarrama   Anesthesiologist: Dr. Bai                                          Radiology Tech: Markus OCONNELL  Fellow: Mahad Rodriguez MD     Sheath:  5 Syrian Sheath    I/Os: estimated blood loss less than 10cc,  IV fluids 150 cc, Urine output 0 cc, Contrast: Omnipaque 240 100 cc,     Vitals:  /95  HR 82  Spo2  100%    Preliminary Report:  Under a 5 Syrian short sheath via the right groin under MAC sedation via left vertebral artery, left internal carotid artery, left external carotid artery, right vertebral artery, right internal carotid artery, right external carotid artery, a selective cerebral angiography  was performed and reveals no vascular malformation or abnormality. ( Official note to follow).    Patient tolerated procedure well.  Patient remains hemodynamically stable, no change in neurological status compared to baseline.  Results were discussed with patient, patient's family and Neurosurgery.  Right groin sheath  was discontinued at 12:18 and closed with vascade. Hemostasis was obtained with approximately 7 minutes of manual compression.     No active bleeding, no hematoma, no ecchymosis.   Quick clot and safeguard balloon dressing applied at 12:25  Patient transferred to NSICU in stable condition.

## 2024-10-28 NOTE — DISCHARGE NOTE PROVIDER - NSDCCPCAREPLAN_GEN_ALL_CORE_FT
PRINCIPAL DISCHARGE DIAGNOSIS  Diagnosis: Intraparenchymal hemorrhage of brain  Assessment and Plan of Treatment:

## 2024-10-28 NOTE — PROGRESS NOTE ADULT - ASSESSMENT
ASSESSMENT:  59yM w/ PMHx of lung cancer (2021) [place] [facility] [place] and aphasia followed by R facial and R sided weakness starting 4:30 PM. 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  -Repeat CTH stable    PLAN:    -Q4h neuro checks  -STAT CTH for any changes in neuro exam  -Pain control prn, avoid oversedation  -Keppra 1500mg BID  -Decadron taper, currently on dex 4q6  -Plan for repeat angio today given concern for AVM in OR  -SBP   -Bowel regimen: senna, miralax; LBM: 10/26  -VTE prophylaxis: SCDs, okay to restart lovenox given stable CTH  -Activity: PT, OT  recommending AR  -Medicine following, reccs appreciated   -Discussed case w/ Dr. Melendrez  ASSESSMENT:  59yM w/ PMHx of lung cancer (2021) s/p R lobectomy [place] [facility] p/w migrane and aphasia followed by R facial and R sided weakness starting 4:30 PM. 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  -Repeat CTH stable    PLAN:    -Q4h neuro checks  -STAT CTH for any changes in neuro exam  -Pain control prn, avoid oversedation  -Keppra 1500mg BID  -Decadron taper, currently on dex 4q6  -Plan for repeat angio today given concern for AVM in OR  -SBP   -Bowel regimen: senna, miralax; LBM: 10/26  -VTE prophylaxis: SCDs, okay to restart lovenox given stable CTH  -Activity: PT, OT  recommending AR; has bed at [place], pending auth  -Medicine following, reccs appreciated   -Discussed case w/ Dr. Melendrez

## 2024-10-28 NOTE — DISCHARGE NOTE PROVIDER - NSDCFUADDINST_GEN_ALL_CORE_FT
Diet: You should continue a soft and bite sized diet. As discussed with the speech pathologist, check mouth frequently for oral residue; crush medication when feasible; maintain upright posture during/after eating for 30 minutes; adequate oral hygiene; small sips/bites while eating. Ensure a well balanced and proper diet to help with proper wound healing. It is imperative for adequate water intake, dietary fiber intake, and ambulation as tolerated to avoid constipation. Please hold aspirin, NSAIDs, goody's powder, anticoagulation, vitamin E, turmeric/mark lattes, cinnamon pills, fish oil, ginseng, and all other supplements until cleared by your neurosurgeon on an outpatient follow up appointment    Pain: It is common to have a headache or incisional pain after surgery. You may take the pain medication we prescribe, or over the counter Tylenol. Pain medication, especially narcotics, can cause constipation. Use stool softeners or mild laxative as needed.     Medication: You have been started on a new medication called Keppra.  Keppra helps control and prevent seizures.  The most common side effects include diarrhea or constipation, excessive sleepiness, irritability and mood changes.  Rare and sometimes serious side effects include rash.  You have been started on a new medication, Oxycodone. Oxycodone helps to control pain. Oxycodone is an additive medication so it is important to limit the use of this medication.  Side effects include nausea, vomiting, constipation, dry mouth, lightheadedness, dizziness or drowsiness.  It is important to tell your physician if you experience any of these side effects.      Activity: Avoid straining, heavy lifting (objects greater than 10 pounds), strenuous activity, twisting, bending, and vigorous exercise. You should not drive or operate machinery until cleared by your neurosurgeon.     Wound: Monitor your incision for drainage, redness, swelling. Call our office if you have any concerns. You may shower. While washing, do not rub, scratch, or scrub your incision. You may wash your incision with mild shampoo/soap. Keep the incision open to air. However, if you want to cover the incision, you may, with a clean scarf or hat.     Appointment(s): Follow up with Dr. Melendrez in our office outpatient in 1 week. Call [phone] to schedule an appointment. Staples will be removed at follow up appointment. Staples can be removed on post op day 14 (11/8/2024). if you are still in rehab during this day they can be removed at rehab. You can schedule a follow-up with Dr. Melendrez once you are discharged from rehab. Please schedule follow-up with Dr. Pitts, neuroIR physician, in 2 weeks. It is also important to see your primary physician to keep them up to date regarding your recent admission and for a formal outpatient comprehensive medical review. Call their offices for an appointment as soon as you leave the hospital. If you do not have a primary physician, you may contact the [facility] [place] [phone] located on [address] [place] [address], [place] [address].    Should you develop any new or worsening neurologic symptoms or have concern about your incision, please call our office immediately or visit the emergency department.    Return to ER immediately for any of the following: fever, bleeding, new onset numbness/tingling/weakness, nausea and/or vomiting, chest pain, shortness of breath, confusion, seizure, altered mental status, urinary and/or fecal incontinence or retention.  hard copy, drawn during this pregnancy

## 2024-10-28 NOTE — CONSULT NOTE ADULT - SUBJECTIVE AND OBJECTIVE BOX
HPI:  59yM PMH Lung cancer, s/p R lobectomy 2022 was admitted on 10-22 from [facility] [place] with NIHSS 7 after L posterior frontal cortical ICH was noted on CT-Head after a 2 days history of worsening aphasia, migraine, and right facial droop which began on 10/21, concerning for hemorrhagic neoplasm.           Imaging Reviewed Today:  TTE 10/22  1. This is a suboptimal and limited study, May consider DANIEL for further evaluation.   2. Left ventricular cavity is normal in size. Left ventricular systolic function is severely decreased with an ejection fraction visually estimated at <20 %.   3. No pericardial effusion seen.   4. No prior echocardiogram is available for comparison.   5. There is no evidence of a left ventricular thrombus.      10-22  MRI brain MRI BRAIN: 1.  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  The findings are suspicious for an underlying hemorrhagic mass.  Superimposed cerebral infarction or hemorrhagic conversion of an infarct is not excluded.  Mild dural thickening and enhancement overlying the left cerebral convexity may be reactive or represent tumor invasion.2.  There is mild subarachnoid hemorrhage in the left frontal region and left sylvian fissure.3.  There is trace subdural hemorrhage overlying the left renal convexity, measuring 1 mm in caliber.4.  There is trace intraventricular hemorrhage dependently in the occipital horns of both lateral ventricles.  No hydrocephalus.5.  The dural venous sinuses and cavernous sinuses are patent on the postcontrast MPRAGE sequence; however there appears to be a thin linear nonocclusive filling defect in the lateral aspect the left transverse sinus extending to the junction with the left sigmoid sinus, which is suspicious for nonocclusive thrombus.    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  Evaluation of the hypoplastic left transverse sinus, left sigmoid sinus and left jugular bulb is limited by flow artifacts on the noncontrast MR venogram and incomplete imaging on the postcontrast MR venogram.  There is suspected nonocclusive thrombus in the left transverse sinus on the postcontrast MPRAGE images of the brain as discussed above.  Repeat postcontrast MR venogram of the entire brain or dedicated CT venogram may be obtained for further evaluation.The remainder of the dural venous sinuses are patent without suspicious filling defect.         10/22 CT CAP  No evidence of metastatic disease or suspicious adenopathy in the chest, abdomen, or pelvis.*  Right pneumonectomy changes, similar to findings present on prior exams.      CT-H 10/23  IMPRESSION: Small stable parenchymal hemorrhage with adjacent  subarachnoid hemorrhage.    New area of high attenuation is seen involving the inferior right  parietal cortex which could be compatible with a new area of parenchymal  hemorrhage.      TTE 10/23   1. Limited follow up study.   2. Technically difficult image quality.   3. Left ventricular cavity is small. Left ventricular systolic function is normal with an ejection fraction visually estimated at 60 to 65 %.   4. Normal right ventricular cavity size and probably normal right ventricular systolic function.   5. No pericardial effusion seen.   6. Compared to the transthoracic echocardiogram performed on 10/22/2024, study quality has improved and grossly with normal biventricular systolic function.    10/23 CT Head  No significant interval change from CT brain.    Similar-appearing left lateral frontal parenchymal hemorrhagic lesion  measures 3.2 x 2.6 x 2.5 cm (maximal AP x TV x CC dimensions).    Contiguous 0.9 x 2.3 x 1.7 cm hemorrhage along the superior aspect of the  primary hemorrhagic lesion.    Previously seen new region of high attenuation in the inferior right  parietal lobe is seen to represent artifact on the current examination.      10/23 EEG  Five focal seizures recorded from the left frontocentral region as described above, last seizure recorded at 7:28am on 10/23/24. Clinically, patient is having oral automatisms and frequent eye blinking, although video quality makes it difficult to interpret.  Left hemispheric focal cerebral dysfunction, which is likely structural in etiology.      10/24 CT Angiogram Head  Negative cerebral angiogram, no vascular abnormality seen      10/26 CT Head  New postoperative changes compatible with high left frontal parietal  craniotomy is identified. Previously noted area of parenchymal hemorrhage  appears to have been removed with only small amount of residual  hemorrhage and air identified in the postop region. Surrounding edema is  again identified. Mass effect on the left lateral ventricle is again seen  as well as left-to-right shift (4.4 mm)    Acute subdural hematoma with associated air in the postop region is  identified which measures approximately 1 cm in widest diameter.    Extra soft tissue swelling hematoma air and staples are seen in the  postop region.    Minimal mucosal thickening is seen involving the right ethmoid sinus    Both mastoid and middle ear regions are clear.  New postoperative changes as described above.      10/26 MR Brain w/wo  Status post craniotomy for resection of left frontal hemorrhagic lesion.  Postoperative MRI demonstrates both T1 hyperintense hemorrhage signal and  nonspecific enhancement at the intra-axial surgical site for which  follow-up is necessary to determine if this reflects postop change or  potential neoplasia. Please correlate with surgical pathology.  Surrounding edema is not significantly changed.    Postoperative extra-axial partly hemorrhagic collection 9 mm width  contributes to mild midline shift to right and this can be monitored on  serial post-op CT. No hydrocephalus or downward herniation.    New scattered cortical infarcts on DWI in the right cerebral hemisphere.  Correlate for a new left-sided deficit.    10/27 CT H  No significant interval change from CT brain 10/26/2024        ----------------------------------------------------------------------------------------  CHIEF COMPLAINT  Mr Rodrigues is seen reclined in his bed, accompanied by his son.  He is able to nod yes and no to questions asked of him.  His son reports that he feels his father has become much more alert and communicative throughout his hospital stay, and he feels that his father is fully understanding him and other people when they communicate with him.  There are 0 [place] their private residence.  Mr Rodrigues was employed as an  in a [employer], but has been on disability due to his lung cancer.  He is waiting for his cerebral angiogram today. He denies significant pain today.         ----------------------------------------------------------------------------------------  VITALS  T(C): 36.4 (10-28-24 @ 12:43), Max: 36.8 (10-27-24 @ 20:00)  HR: 64 (10-28-24 @ 16:00) (64 - 81)  BP: 131/88 (10-28-24 @ 16:00) (114/86 - 146/94)  RR: 15 (10-28-24 @ 16:00) (13 - 23)  SpO2: 99% (10-28-24 @ 16:00) (95% - 99%)  Wt(kg): --    PAST MEDICAL & SURGICAL HISTORY  Lung cancer          LABS  REVIEWED    CBC Full  -  ( 28 Oct 2024 04:25 )  WBC Count : 15.79 K/uL  RBC Count : 3.89 M/uL  Hemoglobin : 11.1 g/dL  Hematocrit : 33.1 %  Platelet Count - Automated : 281 K/uL  Mean Cell Volume : 85.1 fl  Mean Cell Hemoglobin : 28.5 pg  Mean Cell Hemoglobin Concentration : 33.5 gm/dL  Auto Neutrophil # : x  Auto Lymphocyte # : x  Auto Monocyte # : x  Auto Eosinophil # : x  Auto Basophil # : x  Auto Neutrophil % : x  Auto Lymphocyte % : x  Auto Monocyte % : x  Auto Eosinophil % : x  Auto Basophil % : x    10-28    140  |  104  |  16.2  ----------------------------<  128[H]  4.4   |  27.0  |  0.69    Ca    8.8      28 Oct 2024 04:25  Phos  2.6     10-28  Mg     2.2     10-28      Urinalysis Basic - ( 28 Oct 2024 04:25 )    Color: x / Appearance: x / SG: x / pH: x  Gluc: 128 mg/dL / Ketone: x  / Bili: x / Urobili: x   Blood: x / Protein: x / Nitrite: x   Leuk Esterase: x / RBC: x / WBC x   Sq Epi: x / Non Sq Epi: x / Bacteria: x        ALLERGIES  No Known Allergies      MEDICATIONS   acetaminophen     Tablet .. 650 milliGRAM(s) Oral every 6 hours PRN  benzocaine/menthol Lozenge 1 Lozenge Oral every 6 hours PRN  chlorhexidine 2% Cloths 1 Application(s) Topical <User Schedule>  dexAMETHasone  Injectable 4 milliGRAM(s) IV Push every 6 hours  dexAMETHasone  Injectable   IV Push   dextrose 50% Injectable 25 Gram(s) IV Push once  dextrose 50% Injectable 12.5 Gram(s) IV Push once  dextrose 50% Injectable 25 Gram(s) IV Push once  enoxaparin Injectable 40 milliGRAM(s) SubCutaneous every 24 hours  hydrALAZINE Injectable 10 milliGRAM(s) IV Push every 2 hours PRN  insulin lispro (ADMELOG) corrective regimen sliding scale   SubCutaneous three times a day before meals  labetalol Injectable 10 milliGRAM(s) IV Push every 2 hours PRN  levETIRAcetam   Injectable 1500 milliGRAM(s) IV Push every 12 hours  ondansetron Injectable 4 milliGRAM(s) IV Push every 6 hours PRN  oxyCODONE    IR 10 milliGRAM(s) Oral every 4 hours PRN  oxyCODONE    IR 5 milliGRAM(s) Oral every 4 hours PRN  pantoprazole    Tablet 40 milliGRAM(s) Oral daily  polyethylene glycol 3350 17 Gram(s) Oral daily  senna 2 Tablet(s) Oral at bedtime      ----------------------------------------------------------------------------------------  FUNCTIONAL HISTORY  Lives with Wife, 3 adult children able to assist.    Independent    CURRENT FUNCTIONAL STATUS  10/23 SLP Mod-severe receptive & severe expressive language deficits. Difficult to assess cognitive function at this time however guarded. Motor speech will be further assessed, however Pt with likely dysarthria & ?apraxic component.    SLP 10-23 Min oral dysphagia notable for slightly prolonged mastication w/delayed oral transfer of easy to chew & regular. Improved w/soft & bite-sized. Consistent trace-min R sided anterior loss of both solids & liquids, more apparent with liquids. Reduced sensory awareness demonstrated. No oral stasis. Pharyngeal dysphagia suspected, Pt with suspected delayed initiation of swallow trigger as per digital palpation, slight throat clear post intake easy to chew & regular. No overt s/s penetration or aspiration reduplicated w/soft & bite-sized, or thins.      10/27 PT  Sit-Stand Transfer Training  Transfer Training Sit-to-Stand Transfer: minimum assist (75% patient effort);  1 person assist;  full weight-bearing   rolling walker  Transfer Training Stand-to-Sit Transfer: minimum assist (75% patient effort);  1 person assist;  full weight-bearing   rolling walker  Sit-to-Stand Transfer Training Transfer Safety Analysis: decreased balance;  decreased flexibility;  decreased strength    Gait Training  Gait Training: minimum assist (75% patient effort);  1 person assist;  full weight-bearing   rolling walker;  40 ft  Gait Analysis: decreased strength;  impaired balance;  pt requiring intermittent assistance and verbal cues to maintain  right hand on the RW    Therapeutic Exercise  Therapeutic Exercise Detail: Patient educated on bilateral lower extremity therex to perform after rest period (ankle pumps, quad sets 3x10). Patient provided return demonstration.      10/27 OT    Transfer: Sit to Stand:    · Level of [place] minimum assist (75% patients effort)  · Physical Assist/Nonphysical Assist      1 person assist; nonverbal cues (demo/gestures); verbal cues  · Weight-Bearing Restrictions weight-bearing as tolerated  · Assistive Device      rolling walker    Transfer: Stand to Sit:    · Level of [place] minimum assist (75% patients effort)  · Physical Assist/Nonphysical Assist      1 person assist; nonverbal cues (demo/gestures); verbal cues  · Weight-Bearing Restrictions weight-bearing as tolerated  · Assistive Device      rolling walker    Sit/Stand Transfer Safety Analysis:    · Transfer Safety Concerns Noted    decreased weight-shifting ability; decreased sequencing ability  · Impairments Contributing to Impaired Transfers      impaired balance; impaired postural control; decreased strength; impaired coordination; impaired motor control    Transfer: Toilet Transfer:    · Level of [place] minimum assist (75% patients effort); standard toilet  · Physical Assist/Nonphysical Assist      1 person assist; nonverbal cues (demo/gestures); verbal cues  · Weight-Bearing Restrictions weight-bearing as tolerated  · Assistive Device      grab bars; rolling walker    Toilet Transfer Safety Analysis:    · Transfer Safety Concerns Noted    decreased weight-shifting ability; decreased safety awareness; decreased sequencing ability  · Impairments Contributing to Impaired Transfers      decreased strength; impaired postural control; impaired coordination; impaired balance; impaired motor control              ----------------------------------------------------------------------------------------  PHYSICAL EXAM  Constitutional - NAD, Comfortable  HEENT - Left cranial incision with staples in place  Chest - Breathing comfortably  Cardiovascular - extremities WWP  Abdomen - Non-distended  Extremities - Non edematous MICHAELA LE  Neurologic Exam -                    Cognitive - Unable to fully participate     Communication - Non verbal but nods yes, or no to questions     Cranial Nerves - R facial droop, tongue deviates mildly rightward.       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      Disposition - to be determined with clinical course

## 2024-10-28 NOTE — DISCHARGE NOTE PROVIDER - CARE PROVIDER_API CALL
Fracisco Melendrez  Neurosurgery  [address], [address]  [place] [address]  Phone: [phone]  Fax: [phone]  Follow Up Time: 1 week    Sincere Pitts  Interventional Neuroradiology  [address] [place] [id number]  Phone: [phone]  Fax: [phone]  Follow Up Time: 2 weeks

## 2024-10-28 NOTE — CONSULT NOTE ADULT - ATTENDING COMMENTS
Case discussed with Dr. Grewal and agree with recommendations outlined above. I have personally reviewed the imaging and labs listed above.    Rehab/Impaired mobility and function -  Patient continues to require hospitalization for the above diagnoses and ongoing active management of comorbid complications (XX) that are substantially posing a threat to bodily function, functional ability and quality of life.    RECOMMEND - OOB daily, Turn Q2 when needed, HOB >30 degrees    Expect to nee AR. Pending Angio.    Will continue to follow. Rehab recommendations are dependent on how functional progress changes as well as how patient continues to participate and tolerate therapeutic interventions, which may change. Recommend ongoing mobilization by staff to maintain cardiopulmonary function and prevention of secondary complications related to debility. Have discussed the specific rehabilitation management and recommendations above with rehab clinical care team/rehab liaison.      Total Time Spent on Encounter (reviewing clinical notes, labs, radiology and medications, reviewing patient history, pre-rounding, physical exam, assessment and discussing rehabilitation recommendations - with consideration of prior level of function, expected level of recovery and return to community living, rounding with team) - 75 minutes

## 2024-10-29 LAB
ANION GAP SERPL CALC-SCNC: 11 MMOL/L — SIGNIFICANT CHANGE UP (ref 5–17)
BUN SERPL-MCNC: 16.8 MG/DL — SIGNIFICANT CHANGE UP (ref 8–20)
CALCIUM SERPL-MCNC: 8.3 MG/DL — LOW (ref 8.4–10.5)
CHLORIDE SERPL-SCNC: 103 MMOL/L — SIGNIFICANT CHANGE UP (ref 96–108)
CO2 SERPL-SCNC: 25 MMOL/L — SIGNIFICANT CHANGE UP (ref 22–29)
CREAT SERPL-MCNC: 0.69 MG/DL — SIGNIFICANT CHANGE UP (ref 0.5–1.3)
EGFR: 106 ML/MIN/1.73M2 — SIGNIFICANT CHANGE UP
GLUCOSE BLDC GLUCOMTR-MCNC: 122 MG/DL — HIGH (ref 70–99)
GLUCOSE BLDC GLUCOMTR-MCNC: 165 MG/DL — HIGH (ref 70–99)
GLUCOSE BLDC GLUCOMTR-MCNC: 196 MG/DL — HIGH (ref 70–99)
GLUCOSE SERPL-MCNC: 124 MG/DL — HIGH (ref 70–99)
HCT VFR BLD CALC: 34.3 % — LOW (ref 39–50)
HGB BLD-MCNC: 11.6 G/DL — LOW (ref 13–17)
MAGNESIUM SERPL-MCNC: 2.1 MG/DL — SIGNIFICANT CHANGE UP (ref 1.6–2.6)
MCHC RBC-ENTMCNC: 29 PG — SIGNIFICANT CHANGE UP (ref 27–34)
MCHC RBC-ENTMCNC: 33.8 GM/DL — SIGNIFICANT CHANGE UP (ref 32–36)
MCV RBC AUTO: 85.8 FL — SIGNIFICANT CHANGE UP (ref 80–100)
PHOSPHATE SERPL-MCNC: 3.1 MG/DL — SIGNIFICANT CHANGE UP (ref 2.4–4.7)
PLATELET # BLD AUTO: 303 K/UL — SIGNIFICANT CHANGE UP (ref 150–400)
POTASSIUM SERPL-MCNC: 4.5 MMOL/L — SIGNIFICANT CHANGE UP (ref 3.5–5.3)
POTASSIUM SERPL-SCNC: 4.5 MMOL/L — SIGNIFICANT CHANGE UP (ref 3.5–5.3)
RBC # BLD: 4 M/UL — LOW (ref 4.2–5.8)
RBC # FLD: 14.2 % — SIGNIFICANT CHANGE UP (ref 10.3–14.5)
SODIUM SERPL-SCNC: 139 MMOL/L — SIGNIFICANT CHANGE UP (ref 135–145)
WBC # BLD: 13.75 K/UL — HIGH (ref 3.8–10.5)
WBC # FLD AUTO: 13.75 K/UL — HIGH (ref 3.8–10.5)

## 2024-10-29 PROCEDURE — 99232 SBSQ HOSP IP/OBS MODERATE 35: CPT

## 2024-10-29 PROCEDURE — 99233 SBSQ HOSP IP/OBS HIGH 50: CPT

## 2024-10-29 RX ORDER — METHYLPREDNISOLONE ACETATE 80 MG/ML
INJECTION, SUSPENSION INTRALESIONAL; INTRAMUSCULAR; INTRASYNOVIAL; SOFT TISSUE
Refills: 0 | Status: DISCONTINUED | OUTPATIENT
Start: 2024-10-29 | End: 2024-10-29

## 2024-10-29 RX ORDER — DEXAMETHASONE 1.5 MG 1.5 MG/1
4 TABLET ORAL EVERY 6 HOURS
Refills: 0 | Status: DISCONTINUED | OUTPATIENT
Start: 2024-10-29 | End: 2024-10-29

## 2024-10-29 RX ORDER — DEXAMETHASONE 1.5 MG 1.5 MG/1
TABLET ORAL
Refills: 0 | Status: DISCONTINUED | OUTPATIENT
Start: 2024-10-29 | End: 2024-10-29

## 2024-10-29 RX ADMIN — LEVETIRACETAM 1500 MILLIGRAM(S): 500 TABLET, FILM COATED ORAL at 12:18

## 2024-10-29 RX ADMIN — Medication 40 MILLIGRAM(S): at 21:58

## 2024-10-29 RX ADMIN — PANTOPRAZOLE SODIUM 40 MILLIGRAM(S): 40 TABLET, DELAYED RELEASE ORAL at 12:18

## 2024-10-29 RX ADMIN — DEXAMETHASONE 1.5 MG 4 MILLIGRAM(S): 1.5 TABLET ORAL at 00:47

## 2024-10-29 RX ADMIN — DEXAMETHASONE 1.5 MG 2 MILLIGRAM(S): 1.5 TABLET ORAL at 12:19

## 2024-10-29 RX ADMIN — Medication 2: at 12:33

## 2024-10-29 RX ADMIN — Medication 2 TABLET(S): at 21:58

## 2024-10-29 RX ADMIN — CHLORHEXIDINE GLUCONATE 1 APPLICATION(S): 40 SOLUTION TOPICAL at 06:20

## 2024-10-29 RX ADMIN — POLYETHYLENE GLYCOL 3350 17 GRAM(S): 17 POWDER, FOR SOLUTION ORAL at 12:19

## 2024-10-29 RX ADMIN — DEXAMETHASONE 1.5 MG 4 MILLIGRAM(S): 1.5 TABLET ORAL at 06:18

## 2024-10-29 RX ADMIN — LEVETIRACETAM 1500 MILLIGRAM(S): 500 TABLET, FILM COATED ORAL at 00:47

## 2024-10-29 RX ADMIN — Medication 2: at 17:56

## 2024-10-29 NOTE — PROGRESS NOTE ADULT - ASSESSMENT
60 yo M who follows  Dr. Sparks and Dr. Guadarrama at [facility] [place] for a history of  hi grade sarcoma of the lung  s/p  R thoracotomy  R pneumonectomy  rescetion of 9.4 cm tumor S/P 4 cycles AIM chemotherapy LD 5/2023 and adjuvant RT to the right lung LD 9/1/23 current scans are [facility]LE[facility] and with Dr PETE Jean for a history of PE and irregular mural thrombus of descending AA. Completed 9 months of AC hypercoag work up was negative. Now off anticoagulation at this time.  transfer from [facility] [place] p/w migrane and aphasia followed by R facial and R sided weakness starting 4:30 PM. 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   - MRI brain MRI BRAIN: 1.  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  The findings are suspicious for an underlying hemorrhagic mass.  Superimposed cerebral infarction or hemorrhagic conversion of an infarct is not excluded.  Mild dural thickening and enhancement overlying the left cerebral convexity may be reactive or represent tumor invasion.2.  There is mild subarachnoid hemorrhage in the left frontal region and left sylvian fissure.3.  There is trace subdural hemorrhage overlying the left renal convexity, measuring 1 mm in caliber.4.  There is trace intraventricular hemorrhage dependently in the occipital horns of both lateral ventricles.  No hydrocephalus.5.  The dural venous sinuses and cavernous sinuses are patent on the postcontrast MPRAGE sequence; however there appears to be a thin linear nonocclusive filling defect in the lateral aspect the left transverse sinus extending to the junction with the left sigmoid sinus, which is suspicious for nonocclusive thrombus.  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  Evaluation of the hypoplastic left transverse sinus, left sigmoid sinus and left jugular bulb is limited by flow artifacts on the noncontrast MR venogram and incomplete imaging on the postcontrast MR venogram.  There is suspected nonocclusive thrombus in the left transverse sinus on the postcontrast MPRAGE images of the brain as discussed above.  Repeat postcontrast MR venogram of the entire brain or dedicated CT venogram may be obtained for further evaluation.The remainder of the dural venous sinuses are patent without suspicious filling defect.  - Neuro following  - EEG Abnormal EEG study There is a risk for focal seizures with onset in the left anterior temporal region.Left hemispheric focal cerebral dysfunction, which is likely structural in etiology.There were no seizures recorded.    - S/P  Cerebral Angiography  Pre- Procedure Diagnosis: Left frontal parietal IPH Post- Procedure Diagnosis: negative cerebral angiogram, no vascular abnormality seen   - CT CAP -*  No evidence of metastatic disease or suspicious adenopathy in the chest, abdomen, or pelvis.*  Right pneumonectomy changes, similar to findings present on prior exams.  - Neurosurgery following  -Keppra 1500 BID and - Dex 4 q 6 hrs  - s/p L craniotomy 10/25, await path  - repeat MRI 10/26 - t1 hyperintense hemorrhage signal and nonspecidic enhancement at surgical site post op change vs neoplasm.  9mm hemorrhagic collection +MLS to right scattered cortical infarcts right hemisphere   - plan for repeat angio today for concern of AVM    -history of PE and irregular mural thrombus of descending AA.   - Completed 9 months of AC   - hypercoag work up was negative.  - was off anticoagulation at this time.  - dopplers negative for DVT    Right lung hi grade sarcoma   - follows  Dr. Sparks and Dr. Guadarrama at [facility] [place]  -  s/p  R thoracotomy  R pneumonectomy  resection of 9.4 cm tumor   - S/P 4 cycles AIM chemotherapy LD 5/2023   - S/P adjuvant RT to the right lung LD 9/1/23   -  current scans are [facility]LE[facility]INA  - S/P R pneumonectomy   	  WBC 13. 75 likely due to steroids     Will follow and update [facility]

## 2024-10-29 NOTE — PROGRESS NOTE ADULT - SUBJECTIVE AND OBJECTIVE BOX
[facility] Division of Hospital Medicine    Chief Complaint:  ICH     SUBJECTIVE / OVERNIGHT EVENTS:  Pt non verbal   Shakes head no when asked about pain or discomfort  Family at bedside     Unable to perform ROS     MEDICATIONS  (STANDING):  chlorhexidine 2% Cloths 1 Application(s) Topical <User Schedule>  dexAMETHasone  Injectable 2 milliGRAM(s) IV Push every 6 hours  dexAMETHasone  Injectable   IV Push   dextrose 50% Injectable 12.5 Gram(s) IV Push once  dextrose 50% Injectable 25 Gram(s) IV Push once  dextrose 50% Injectable 25 Gram(s) IV Push once  enoxaparin Injectable 40 milliGRAM(s) SubCutaneous every 24 hours  insulin lispro (ADMELOG) corrective regimen sliding scale   SubCutaneous three times a day before meals  levETIRAcetam   Injectable 1500 milliGRAM(s) IV Push every 12 hours  pantoprazole    Tablet 40 milliGRAM(s) Oral daily  polyethylene glycol 3350 17 Gram(s) Oral daily  senna 2 Tablet(s) Oral at bedtime    MEDICATIONS  (PRN):  acetaminophen     Tablet .. 650 milliGRAM(s) Oral every 6 hours PRN Mild Pain (1 - 3)  benzocaine/menthol Lozenge 1 Lozenge Oral every 6 hours PRN Sore Throat  hydrALAZINE Injectable 10 milliGRAM(s) IV Push every 2 hours PRN SBP>160  labetalol Injectable 10 milliGRAM(s) IV Push every 2 hours PRN SBP>160  ondansetron Injectable 4 milliGRAM(s) IV Push every 6 hours PRN Nausea and/or Vomiting  oxyCODONE    IR 5 milliGRAM(s) Oral every 4 hours PRN Moderate Pain (4 - 6)  oxyCODONE    IR 10 milliGRAM(s) Oral every 4 hours PRN Severe Pain (7 - 10)        I&O's Summary    28 Oct 2024 07:01  -  29 Oct 2024 07:00  --------------------------------------------------------  IN: 480 mL / OUT: 1050 mL / NET: -570 mL        PHYSICAL EXAM:  Vital Signs Last 24 Hrs  T(C): 37 (29 Oct 2024 08:00), Max: 37.2 (29 Oct 2024 00:02)  T(F): 98.6 (29 Oct 2024 08:00), Max: 99 (29 Oct 2024 00:02)  HR: 73 (29 Oct 2024 08:00) (60 - 84)  BP: 123/78 (29 Oct 2024 08:00) (116/72 - 146/96)  BP(mean): 94 (28 Oct 2024 16:30) (93 - 110)  RR: 18 (29 Oct 2024 08:00) (13 - 18)  SpO2: 98% (29 Oct 2024 08:00) (96% - 99%)    Parameters below as of 29 Oct 2024 08:00  Patient On (Oxygen Delivery Method): room air        CONSTITUTIONAL: Awake, alert and in no apparent distress   CARDIAC: Normal rate, regular rhythm.  Heart sounds S1, S2.   RESPIRATORY: Breath sounds clear and equal bilaterally.   ABDOMINAL: Nontender to palpation. No rebound/ guarding   EXTREMITIES: No edema, cyanosis or deformity   NEUROLOGICAL: Expressive aphasia, R sided weakness compared to L         LABS:                        11.6   13.75 )-----------( 303      ( 29 Oct 2024 04:57 )             34.3     10-29    139  |  103  |  16.8  ----------------------------<  124[H]  4.5   |  25.0  |  0.69    Ca    8.3[L]      29 Oct 2024 04:57  Phos  3.1     10-29  Mg     2.1     10-29      PT/INR - ( 28 Oct 2024 04:25 )   PT: 13.0 sec;   INR: 1.15 ratio         PTT - ( 28 Oct 2024 04:25 )  PTT:26.0 sec      Urinalysis Basic - ( 29 Oct 2024 04:57 )    Color: x / Appearance: x / SG: x / pH: x  Gluc: 124 mg/dL / Ketone: x  / Bili: x / Urobili: x   Blood: x / Protein: x / Nitrite: x   Leuk Esterase: x / RBC: x / WBC x   Sq Epi: x / Non Sq Epi: x / Bacteria: x        CAPILLARY BLOOD GLUCOSE      POCT Blood Glucose.: 122 mg/dL (29 Oct 2024 08:37)  POCT Blood Glucose.: 185 mg/dL (28 Oct 2024 16:40)  POCT Blood Glucose.: 108 mg/dL (28 Oct 2024 12:39)

## 2024-10-29 NOTE — PROGRESS NOTE ADULT - SUBJECTIVE AND OBJECTIVE BOX
HPI: 59yM w/ PMHx of lung cancer (2021) s/p R lobectomy transfer from [facility] p/w migrane and aphasia followed by R facial and R sided weakness starting 4:30 PM. 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    INTERVAL HX/OVERNIGHT EVENTS:  Patient seen and examined at bedside. Denies any acute complaints. Last BM: 10/26      Vital Signs Last 24 Hrs  T(C): 37 (29 Oct 2024 08:00), Max: 37.2 (29 Oct 2024 00:02)  T(F): 98.6 (29 Oct 2024 08:00), Max: 99 (29 Oct 2024 00:02)  HR: 73 (29 Oct 2024 08:00) (60 - 84)  BP: 123/78 (29 Oct 2024 08:00) (116/72 - 146/96)  BP(mean): 94 (28 Oct 2024 16:30) (93 - 110)  RR: 18 (29 Oct 2024 08:00) (13 - 18)  SpO2: 98% (29 Oct 2024 08:00) (96% - 99%)    Parameters below as of 29 Oct 2024 08:00  Patient On (Oxygen Delivery Method): room air    PHYSICAL EXAM:  GENERAL: NAD    INCISION: Left incision site, staples in place, intact  HEAD: normocephalic   MENTAL STATUS: AAO x 2-3 (nods appropriately severe expressive aphasia, following simple commands    CRANIAL NERVES: PERRL, EOMI without nystagmus. R facial, Tongue deviated slight towards right. Hearing grossly intact. Head turning and shoulder shrug intact.  MOTOR: strength 5/5 LUE/BLE, RUE: R bi 4+/5, tri 4/5, R HG 4-/5    SENSATION: grossly intact to light touch all extremities   SKIN: Warm, dry    LABS:                        11.6   13.75 )-----------( 303      ( 29 Oct 2024 04:57 )             34.3     10-29    139  |  103  |  16.8  ----------------------------<  124[H]  4.5   |  25.0  |  0.69    Ca    8.3[L]      29 Oct 2024 04:57  Phos  3.1     10-29  Mg     2.1     10-29      PT/INR - ( 28 Oct 2024 04:25 )   PT: 13.0 sec;   INR: 1.15 ratio         PTT - ( 28 Oct 2024 04:25 )  PTT:26.0 sec  Urinalysis Basic - ( 29 Oct 2024 04:57 )    Color: x / Appearance: x / SG: x / pH: x  Gluc: 124 mg/dL / Ketone: x  / Bili: x / Urobili: x   Blood: x / Protein: x / Nitrite: x   Leuk Esterase: x / RBC: x / WBC x   Sq Epi: x / Non Sq Epi: x / Bacteria: x      10-28 @ 07:01  -  10-29 @ 07:00  --------------------------------------------------------  IN: 480 mL / OUT: 1050 mL / NET: -570 mL      RADIOLOGY & ADDITIONAL TESTS:

## 2024-10-29 NOTE — PROGRESS NOTE ADULT - ASSESSMENT
58 yo M with PMHX of lung cancer (diagnosed 2021) s/p R lobectomy ([facility] 2022) and HLD  p/w migraine and aphasia starting 4:30 pm 10/21/2024  followed by new onset R facial droop  and R sided weakness starting morning 10/22/2024. Wife states she gave one tablet of baby ASA around 4:30 pm the day prior for migraine. Patient denies daily use of AC/AP. Denies trauma. NIHSS 7 upon admission. Patient found to have L posterior frontal cortical ICH, transferred to [facility] for further care (10/25/2024). Patient on cardene in ED, transferred to NeuroICU for monitoring.   Patient underwent MRI w/wo which showed likely mass but unable to r/o underlying vascular abnormality. He underwent cerebral angiogram to assess vasculature on 10/24/24 prior to OR which was negative for any underlying vascular abnormality. Patient went to the OR on friday 10/25/24 for mass resection with concerns for possible AVM. Neuro IR was reconsulted by Neurosurgery who recommended repeating cerebral angiogram, planned for 10/28/24 with Dr. Pitts.     L frontal posterior ICH s/p resection for underlying mass   r/o possible AVM   - c/w neuro checks  - Repeat CTH stable   - Keppra 1500 mg BID  - Decadron taper   - s/p repeat angio 10/28 with no vascular malformation or abnormality   - SBP goal< 160     Hx of lung cancer   - s/p R thoracotomy R pneumonectomy resection of 9.4 cm tumor   - Follows with [facility]    history of PE and irregular mural thrombus of descending AA   - Completed 9 months of AC, has been off AC   - dopplers negative for DVT    Hx of HLD  - high dose statin     DVT ppx - per primary team

## 2024-10-29 NOTE — CHART NOTE - NSCHARTNOTEFT_GEN_A_CORE
Patient seen and examined at bedside. Patient s/p cerebral angiogram via right groin, POD1. Groin examined. Groin soft, nontender, no hematoma, no ecchymoses. Dressing removed. Distal pulses 2+. Questions/concerns answered. Left in NAD.

## 2024-10-29 NOTE — PROGRESS NOTE ADULT - SUBJECTIVE AND OBJECTIVE BOX
58 yo M who follows  Dr. Sparks and [place] [facility] [place] a [place]  hi grade sarcoma of the lung  s/p  R thoracotomy  R pneumonectomy  rescetion of 9.4 cm tumor S/P 4 cycles AIM chemotherapy LD 5/2023 and adjuvant RT to the right lung LD 9/1/23 current scans are [facility] and with Dr PETE Jean for a history of PE and irregular mural thrombus of descending AA. Completed 9 months of AC hypercoag work up was negative. Now off anticoagulation at this time. presents with aphasia starting 4:30 pm 10/21/2024  followed by new onset R facial droop  and R sided weakness starting this morning 10/22/2024. Wife states she gave one tablet of baby ASA around 4:30 pm yesterdat for migrane. Patient denies daily use of AC/AP. Denies trauma. NIHSS 7 upon admission. Patient found to have L posterior frontal cortical ICH, transfered to [facility] for further care. Patient on cardene in ED, transferred to Neuro ICU for monitoring      MEDICATIONS  (STANDING):  chlorhexidine 2% Cloths 1 Application(s) Topical <User Schedule>  dexAMETHasone  Injectable 4 milliGRAM(s) IV Push every 6 hours  dextrose 50% Injectable 12.5 Gram(s) IV Push once  dextrose 50% Injectable 25 Gram(s) IV Push once  dextrose 50% Injectable 25 Gram(s) IV Push once  insulin lispro (ADMELOG) corrective regimen sliding scale   SubCutaneous three times a day before meals  levETIRAcetam   Injectable 1500 milliGRAM(s) IV Push every 12 hours  pantoprazole    Tablet 40 milliGRAM(s) Oral daily  polyethylene glycol 3350 17 Gram(s) Oral daily  senna 2 Tablet(s) Oral at bedtime    MEDICATIONS  (PRN):  acetaminophen     Tablet .. 650 milliGRAM(s) Oral every 6 hours PRN Mild Pain (1 - 3)  benzocaine/menthol Lozenge 1 Lozenge Oral every 6 hours PRN Sore Throat  hydrALAZINE Injectable 10 milliGRAM(s) IV Push every 2 hours PRN SBP>140  labetalol Injectable 10 milliGRAM(s) IV Push every 2 hours PRN SBP>140  ondansetron Injectable 4 milliGRAM(s) IV Push every 6 hours PRN Nausea and/or Vomiting  oxyCODONE    IR 5 milliGRAM(s) Oral every 4 hours PRN Moderate Pain (4 - 6)  oxyCODONE    IR 10 milliGRAM(s) Oral every 4 hours PRN Severe Pain (7 - 10)      Vital Signs Last 24 Hrs  T(C): 37 (29 Oct 2024 08:00), Max: 37.2 (29 Oct 2024 00:02)  T(F): 98.6 (29 Oct 2024 08:00), Max: 99 (29 Oct 2024 00:02)  HR: 73 (29 Oct 2024 08:00) (60 - 84)  BP: 123/78 (29 Oct 2024 08:00) (116/72 - 146/96)  BP(mean): 94 (28 Oct 2024 16:30) (93 - 110)  RR: 18 (29 Oct 2024 08:00) (13 - 18)  SpO2: 98% (29 Oct 2024 08:00) (96% - 99%)    Parameters below as of 29 Oct 2024 08:00  Patient On (Oxygen Delivery Method): room air       PHYSICAL EXAM:  General: No Acute Distress   Neurological: Awake, alert & oriented following Commands, Severe expressive aphasia.    Cardiovascular:  +S1, +S2  Gastrointestinal: Soft  Nondistended      CBC                          11.6   13.75 )-----------( 303      ( 29 Oct 2024 04:57 )             34.3                           11.7   15.45 )-----------( 276      ( 27 Oct 2024 05:15 )             35.0                           11.3   19.04 )-----------( 283      ( 26 Oct 2024 04:45 )             33.5                           11.7   21.39 )-----------( 284      ( 25 Oct 2024 03:00 )             35.1                           13.4   12.77 )-----------( 296      ( 24 Oct 2024 03:30 )             40.0       CHEM    10-29    139  |  103  |  16.8  ----------------------------<  124[H]  4.5   |  25.0  |  0.69    Ca    8.3[L]      29 Oct 2024 04:57  Phos  3.1     10-29  Mg     2.1     10-29        10-25    138  |  103  |  12.4  ----------------------------<  118[H]  4.3   |  25.0  |  0.74    Ca    8.8      25 Oct 2024 03:00  Phos  3.4     10-25  Mg     1.9     10-25        10-24    138  |  102  |  12.2  ----------------------------<  141[H]  4.8   |  24.0  |  0.75    Ca    9.1      24 Oct 2024 03:30  Phos  2.5     10-24  Mg     2.4     10-24    TPro  7.5  /  Alb  3.9  /  TBili  0.7  /  DBili  x   /  AST  18  /  ALT  9   /  AlkPhos  120  10-22

## 2024-10-29 NOTE — PROGRESS NOTE ADULT - SUBJECTIVE AND OBJECTIVE BOX
CC: Patient being seen for rehabilitation follow up.  Patient did not have a good night's sleep.   His roommate has been triggering the bed alarm and kept him up all night.  Son at bedside.  Encouraged to phonate.     FUNCTIONAL PROGRESS  10/29 PT  Sit-Stand Transfer Training  Sit-to-Stand Transfer Training Rehab Potential: good, to achieve stated therapy goals  Transfer Training Sit-to-Stand Transfer: minimum assist (75% patient effort);  1 person assist;  rolling walker  Transfer Training Stand-to-Sit Transfer: minimum assist (75% patient effort);  1 person assist;  rolling walker  Sit-to-Stand Transfer Training Transfer Safety Analysis: decreased weight-shifting ability;  decreased strength;  impaired balance    Gait Training  Gait Training Rehab Potential: good, to achieve stated therapy goals  Gait Training: minimum assist (75% patient effort);  1 person assist;  rolling walker;  45ft  Gait Analysis: decreased step length;  decreased stride length;  decreased weight-shifting ability;  impaired balance;  decreased strength  10/27 OT  Bathing Training:     · Level of Edwards	moderate assist (50% patients effort)    Upper Body Dressing Training:     · Level of Edwards	moderate assist (50% patients effort)    Lower Body Dressing Training:     · Level of Edwards	moderate assist (50% patients effort)    Toilet Hygiene Training:     · Level of Edwards	to assess    Grooming Training:     · Level of Edwards	minimum assist (75% patients effort)    Eating/Self-Feeding Training:     · Level of Edwards	Did not directly observe  · Physical Assist/Nonphysical Assist	pt family bedside reports needs assist with dominant R hand to grasp utensils, with noted spillage, pt given built up red tubing to increase independence, demonstrating ability to properly and successfully use tool for self feeding      VITALS  T(C): 37 (10-29-24 @ 08:00), Max: 37.2 (10-29-24 @ 00:02)  HR: 73 (10-29-24 @ 08:00) (60 - 84)  BP: 123/78 (10-29-24 @ 08:00) (116/72 - 146/96)  RR: 18 (10-29-24 @ 08:00) (13 - 18)  SpO2: 98% (10-29-24 @ 08:00) (96% - 99%)  Wt(kg): --    MEDICATIONS   acetaminophen     Tablet .. 650 milliGRAM(s) every 6 hours PRN  benzocaine/menthol Lozenge 1 Lozenge every 6 hours PRN  chlorhexidine 2% Cloths 1 Application(s) <User Schedule>  dexAMETHasone  Injectable 2 milliGRAM(s) every 6 hours  dexAMETHasone  Injectable     dextrose 50% Injectable 25 Gram(s) once  dextrose 50% Injectable 12.5 Gram(s) once  dextrose 50% Injectable 25 Gram(s) once  enoxaparin Injectable 40 milliGRAM(s) every 24 hours  hydrALAZINE Injectable 10 milliGRAM(s) every 2 hours PRN  insulin lispro (ADMELOG) corrective regimen sliding scale   three times a day before meals  labetalol Injectable 10 milliGRAM(s) every 2 hours PRN  levETIRAcetam   Injectable 1500 milliGRAM(s) every 12 hours  ondansetron Injectable 4 milliGRAM(s) every 6 hours PRN  oxyCODONE    IR 5 milliGRAM(s) every 4 hours PRN  oxyCODONE    IR 10 milliGRAM(s) every 4 hours PRN  pantoprazole    Tablet 40 milliGRAM(s) daily  polyethylene glycol 3350 17 Gram(s) daily  senna 2 Tablet(s) at bedtime      RECENT LABS/IMAGING  - Reviewed Today                        11.6   13.75 )-----------( 303      ( 29 Oct 2024 04:57 )             34.3     10-29    139  |  103  |  16.8  ----------------------------<  124[H]  4.5   |  25.0  |  0.69    Ca    8.3[L]      29 Oct 2024 04:57  Phos  3.1     10-29  Mg     2.1     10-29      PT/INR - ( 28 Oct 2024 04:25 )   PT: 13.0 sec;   INR: 1.15 ratio         PTT - ( 28 Oct 2024 04:25 )  PTT:26.0 sec  Urinalysis Basic - ( 29 Oct 2024 04:57 )    Color: x / Appearance: x / SG: x / pH: x  Gluc: 124 mg/dL / Ketone: x  / Bili: x / Urobili: x   Blood: x / Protein: x / Nitrite: x   Leuk Esterase: x / RBC: x / WBC x   Sq Epi: x / Non Sq Epi: x / Bacteria: x              TTE 10/22  1. This is a suboptimal and limited study, May consider DANIEL for further evaluation.   2. Left ventricular cavity is normal in size. Left ventricular systolic function is severely decreased with an ejection fraction visually estimated at <20 %.   3. No pericardial effusion seen.   4. No prior echocardiogram is available for comparison.   5. There is no evidence of a left ventricular thrombus.      10-22  MRI brain MRI BRAIN: 1.  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  The findings are suspicious for an underlying hemorrhagic mass.  Superimposed cerebral infarction or hemorrhagic conversion of an infarct is not excluded.  Mild dural thickening and enhancement overlying the left cerebral convexity may be reactive or represent tumor invasion.2.  There is mild subarachnoid hemorrhage in the left frontal region and left sylvian fissure.3.  There is trace subdural hemorrhage overlying the left renal convexity, measuring 1 mm in caliber.4.  There is trace intraventricular hemorrhage dependently in the occipital horns of both lateral ventricles.  No hydrocephalus.5.  The dural venous sinuses and cavernous sinuses are patent on the postcontrast MPRAGE sequence; however there appears to be a thin linear nonocclusive filling defect in the lateral aspect the left transverse sinus extending to the junction with the left sigmoid sinus, which is suspicious for nonocclusive thrombus.    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  Evaluation of the hypoplastic left transverse sinus, left sigmoid sinus and left jugular bulb is limited by flow artifacts on the noncontrast MR venogram and incomplete imaging on the postcontrast MR venogram.  There is suspected nonocclusive thrombus in the left transverse sinus on the postcontrast MPRAGE images of the brain as discussed above.  Repeat postcontrast MR venogram of the entire brain or dedicated CT venogram may be obtained for further evaluation.The remainder of the dural venous sinuses are patent without suspicious filling defect.         10/22 CT CAP  No evidence of metastatic disease or suspicious adenopathy in the chest, abdomen, or pelvis.*  Right pneumonectomy changes, similar to findings present on prior exams.      CT-H 10/23  IMPRESSION: Small stable parenchymal hemorrhage with adjacent  subarachnoid hemorrhage.    New area of high attenuation is seen involving the inferior right  parietal cortex which could be compatible with a new area of parenchymal  hemorrhage.      TTE 10/23   1. Limited follow up study.   2. Technically difficult image quality.   3. Left ventricular cavity is small. Left ventricular systolic function is normal with an ejection fraction visually estimated at 60 to 65 %.   4. Normal right ventricular cavity size and probably normal right ventricular systolic function.   5. No pericardial effusion seen.   6. Compared to the transthoracic echocardiogram performed on 10/22/2024, study quality has improved and grossly with normal biventricular systolic function.    10/23 CT Head  No significant interval change from CT brain.    Similar-appearing left lateral frontal parenchymal hemorrhagic lesion  measures 3.2 x 2.6 x 2.5 cm (maximal AP x TV x CC dimensions).    Contiguous 0.9 x 2.3 x 1.7 cm hemorrhage along the superior aspect of the  primary hemorrhagic lesion.    Previously seen new region of high attenuation in the inferior right  parietal lobe is seen to represent artifact on the current examination.      10/23 EEG  Five focal seizures recorded from the left frontocentral region as described above, last seizure recorded at 7:28am on 10/23/24. Clinically, patient is having oral automatisms and frequent eye blinking, although video quality makes it difficult to interpret.  Left hemispheric focal cerebral dysfunction, which is likely structural in etiology.      10/24 CT Angiogram Head  Negative cerebral angiogram, no vascular abnormality seen      10/26 CT Head  New postoperative changes compatible with high left frontal parietal  craniotomy is identified. Previously noted area of parenchymal hemorrhage  appears to have been removed with only small amount of residual  hemorrhage and air identified in the postop region. Surrounding edema is  again identified. Mass effect on the left lateral ventricle is again seen  as well as left-to-right shift (4.4 mm)    Acute subdural hematoma with associated air in the postop region is  identified which measures approximately 1 cm in widest diameter.    Extra soft tissue swelling hematoma air and staples are seen in the  postop region.    Minimal mucosal thickening is seen involving the right ethmoid sinus    Both mastoid and middle ear regions are clear.  New postoperative changes as described above.      10/26 MR Brain w/wo  Status post craniotomy for resection of left frontal hemorrhagic lesion.  Postoperative MRI demonstrates both T1 hyperintense hemorrhage signal and  nonspecific enhancement at the intra-axial surgical site for which  follow-up is necessary to determine if this reflects postop change or  potential neoplasia. Please correlate with surgical pathology.  Surrounding edema is not significantly changed.    Postoperative extra-axial partly hemorrhagic collection 9 mm width  contributes to mild midline shift to right and this can be monitored on  serial post-op CT. No hydrocephalus or downward herniation.    New scattered cortical infarcts on DWI in the right cerebral hemisphere.  Correlate for a new left-sided deficit.    10/27 CT H  No significant interval change from CT brain 10/26/2024        ----------------------------------------------------------------------------------------  PHYSICAL EXAM  Constitutional - NAD, Comfortable  HEENT - Left cranial incision    Extremities - No edema   Neurologic Exam -                    Cognitive - Awake Alert     Communication - Limited phonation, Limited attempt to verbalize, Nod head     Cranial Nerves - Right lip droops     FUNCTIONAL MOTOR EXAM -                      LEFT    UE - ShAB 5/5, EF 5/5, EE 5/5, WE 5/5,  5/5                    RIGHT UE - EF 4/5, EE 4/5, WE 4/5,  3/5                    LEFT    LE - HF 5/5, KE 5/5, DF 5/5                     RIGHT LE - HF 5/5, KE 5/5, DF 2/5   Psychiatric - Fatigued, Frustrated   ----------------------------------------------------------------------------------------  ASSESSMENT/PLAN  59yMale with functional deficits after developing a an ICH   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     When medically optimized, based on the patient's diagnosis, current functional status and potential for progress, recommend ACUTE inpatient rehabilitation for the functional deficits consisting of 3 hours of therapy/day & 24 hour RN/daily PMR physician for active comorbid medical management. Patient will be able to tolerate 3 hours a day.     Will continue to follow. Rehab recommendations are dependent on how functional progress changes as well as how patient continues to participate and tolerate therapeutic interventions, WHICH MAY CHANGE UPON FOLLOW UP EVALUATIONS. Recommend ongoing mobilization by staff to maintain cardiopulmonary function and prevention of secondary complications related to debility. Have discussed the specific rehabilitation management and recommendations above with rehab clinical care team/rehab liaison.      Total Time Spent on Encounter (reviewing clinical notes, labs, radiology, medications, patient history/exam, assessment and plan) - 50 minutes

## 2024-10-30 VITALS
HEART RATE: 73 BPM | DIASTOLIC BLOOD PRESSURE: 79 MMHG | OXYGEN SATURATION: 98 % | TEMPERATURE: 98 F | SYSTOLIC BLOOD PRESSURE: 122 MMHG

## 2024-10-30 LAB — GLUCOSE BLDC GLUCOMTR-MCNC: 86 MG/DL — SIGNIFICANT CHANGE UP (ref 70–99)

## 2024-10-30 PROCEDURE — C1894: CPT

## 2024-10-30 PROCEDURE — 84295 ASSAY OF SERUM SODIUM: CPT

## 2024-10-30 PROCEDURE — 36415 COLL VENOUS BLD VENIPUNCTURE: CPT

## 2024-10-30 PROCEDURE — 82435 ASSAY OF BLOOD CHLORIDE: CPT

## 2024-10-30 PROCEDURE — 36223 PLACE CATH CAROTID/INOM ART: CPT

## 2024-10-30 PROCEDURE — 85014 HEMATOCRIT: CPT

## 2024-10-30 PROCEDURE — 83605 ASSAY OF LACTIC ACID: CPT

## 2024-10-30 PROCEDURE — 88360 TUMOR IMMUNOHISTOCHEM/MANUAL: CPT

## 2024-10-30 PROCEDURE — 36226 PLACE CATH VERTEBRAL ART: CPT

## 2024-10-30 PROCEDURE — C8929: CPT

## 2024-10-30 PROCEDURE — 93005 ELECTROCARDIOGRAM TRACING: CPT

## 2024-10-30 PROCEDURE — 93970 EXTREMITY STUDY: CPT

## 2024-10-30 PROCEDURE — 95714 VEEG EA 12-26 HR UNMNTR: CPT

## 2024-10-30 PROCEDURE — C1769: CPT

## 2024-10-30 PROCEDURE — 85025 COMPLETE CBC W/AUTO DIFF WBC: CPT

## 2024-10-30 PROCEDURE — 88307 TISSUE EXAM BY PATHOLOGIST: CPT

## 2024-10-30 PROCEDURE — 70553 MRI BRAIN STEM W/O & W/DYE: CPT | Mod: MC

## 2024-10-30 PROCEDURE — 80048 BASIC METABOLIC PNL TOTAL CA: CPT

## 2024-10-30 PROCEDURE — 99232 SBSQ HOSP IP/OBS MODERATE 35: CPT

## 2024-10-30 PROCEDURE — 84132 ASSAY OF SERUM POTASSIUM: CPT

## 2024-10-30 PROCEDURE — 88341 IMHCHEM/IMCYTCHM EA ADD ANTB: CPT

## 2024-10-30 PROCEDURE — 86850 RBC ANTIBODY SCREEN: CPT

## 2024-10-30 PROCEDURE — 70450 CT HEAD/BRAIN W/O DYE: CPT | Mod: MC

## 2024-10-30 PROCEDURE — 82947 ASSAY GLUCOSE BLOOD QUANT: CPT

## 2024-10-30 PROCEDURE — 88342 IMHCHEM/IMCYTCHM 1ST ANTB: CPT

## 2024-10-30 PROCEDURE — 83880 ASSAY OF NATRIURETIC PEPTIDE: CPT

## 2024-10-30 PROCEDURE — 82803 BLOOD GASES ANY COMBINATION: CPT

## 2024-10-30 PROCEDURE — 80053 COMPREHEN METABOLIC PANEL: CPT

## 2024-10-30 PROCEDURE — 80061 LIPID PANEL: CPT

## 2024-10-30 PROCEDURE — 74177 CT ABD & PELVIS W/CONTRAST: CPT | Mod: MC

## 2024-10-30 PROCEDURE — C8924: CPT

## 2024-10-30 PROCEDURE — 97535 SELF CARE MNGMENT TRAINING: CPT

## 2024-10-30 PROCEDURE — 99233 SBSQ HOSP IP/OBS HIGH 50: CPT

## 2024-10-30 PROCEDURE — 71260 CT THORAX DX C+: CPT | Mod: MC

## 2024-10-30 PROCEDURE — 82330 ASSAY OF CALCIUM: CPT

## 2024-10-30 PROCEDURE — 95700 EEG CONT REC W/VID EEG TECH: CPT

## 2024-10-30 PROCEDURE — 95711 VEEG 2-12 HR UNMONITORED: CPT

## 2024-10-30 PROCEDURE — 83735 ASSAY OF MAGNESIUM: CPT

## 2024-10-30 PROCEDURE — 99285 EMERGENCY DEPT VISIT HI MDM: CPT | Mod: 25

## 2024-10-30 PROCEDURE — 87641 MR-STAPH DNA AMP PROBE: CPT

## 2024-10-30 PROCEDURE — C1889: CPT

## 2024-10-30 PROCEDURE — 86901 BLOOD TYPING SEROLOGIC RH(D): CPT

## 2024-10-30 PROCEDURE — 85610 PROTHROMBIN TIME: CPT

## 2024-10-30 PROCEDURE — 85018 HEMOGLOBIN: CPT

## 2024-10-30 PROCEDURE — 84443 ASSAY THYROID STIM HORMONE: CPT

## 2024-10-30 PROCEDURE — 84484 ASSAY OF TROPONIN QUANT: CPT

## 2024-10-30 PROCEDURE — 84100 ASSAY OF PHOSPHORUS: CPT

## 2024-10-30 PROCEDURE — 87640 STAPH A DNA AMP PROBE: CPT

## 2024-10-30 PROCEDURE — 86900 BLOOD TYPING SEROLOGIC ABO: CPT

## 2024-10-30 PROCEDURE — 85027 COMPLETE CBC AUTOMATED: CPT

## 2024-10-30 PROCEDURE — 36224 PLACE CATH CAROTD ART: CPT

## 2024-10-30 PROCEDURE — 70545 MR ANGIOGRAPHY HEAD W/DYE: CPT | Mod: MC

## 2024-10-30 PROCEDURE — C1713: CPT

## 2024-10-30 PROCEDURE — C1887: CPT

## 2024-10-30 PROCEDURE — 36227 PLACE CATH XTRNL CAROTID: CPT

## 2024-10-30 PROCEDURE — 97530 THERAPEUTIC ACTIVITIES: CPT

## 2024-10-30 PROCEDURE — 85730 THROMBOPLASTIN TIME PARTIAL: CPT

## 2024-10-30 PROCEDURE — 82962 GLUCOSE BLOOD TEST: CPT

## 2024-10-30 PROCEDURE — 96374 THER/PROPH/DIAG INJ IV PUSH: CPT

## 2024-10-30 PROCEDURE — 83036 HEMOGLOBIN GLYCOSYLATED A1C: CPT

## 2024-10-30 RX ORDER — METHYLPREDNISOLONE ACETATE 80 MG/ML
1 INJECTION, SUSPENSION INTRALESIONAL; INTRAMUSCULAR; INTRASYNOVIAL; SOFT TISSUE
Qty: 1 | Refills: 0
Start: 2024-10-30

## 2024-10-30 RX ADMIN — LEVETIRACETAM 1500 MILLIGRAM(S): 500 TABLET, FILM COATED ORAL at 00:19

## 2024-10-30 RX ADMIN — PANTOPRAZOLE SODIUM 40 MILLIGRAM(S): 40 TABLET, DELAYED RELEASE ORAL at 11:56

## 2024-10-30 RX ADMIN — LEVETIRACETAM 1500 MILLIGRAM(S): 500 TABLET, FILM COATED ORAL at 11:56

## 2024-10-30 NOTE — PROGRESS NOTE ADULT - ASSESSMENT
60 yo M who follows  Dr. Sparks and Dr. Guadarrama at [facility] for a history of  hi grade sarcoma of the lung  s/p  R thoracotomy  R pneumonectomy  rescetion of 9.4 cm tumor S/P 4 cycles AIM chemotherapy LD 5/2023 and adjuvant RT to the right lung LD 9/1/23 current scans are [facility] and with Dr PETE Jean for a history of PE and irregular mural thrombus of descending AA. Completed 9 months of AC hypercoag work up was negative. Now off anticoagulation at this time.  transfer from [facility] p/w migrane and aphasia followed by R facial and R sided weakness starting 4:30 PM. Found to have L posterior frontal cortical ICH, admitted to Neuro ICU for further care     L posterior frontal cortical ICH  - CTA head/neck Negative.   - MRI brain MRI BRAIN: 1.  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  The findings are suspicious for an underlying hemorrhagic mass.  Superimposed cerebral infarction or hemorrhagic conversion of an infarct is not excluded.  Mild dural thickening and enhancement overlying the left cerebral convexity may be reactive or represent tumor invasion.2.  There is mild subarachnoid hemorrhage in the left frontal region and left sylvian fissure.3.  There is trace subdural hemorrhage overlying the left renal convexity, measuring 1 mm in caliber.4.  There is trace intraventricular hemorrhage dependently in the occipital horns of both lateral ventricles.  No hydrocephalus.5.  The dural venous sinuses and cavernous sinuses are patent on the postcontrast MPRAGE sequence; however there appears to be a thin linear nonocclusive filling defect in the lateral aspect the left transverse sinus extending to the junction with the left sigmoid sinus, which is suspicious for nonocclusive thrombus.  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  Evaluation of the hypoplastic left transverse sinus, left sigmoid sinus and left jugular bulb is limited by flow artifacts on the noncontrast MR venogram and incomplete imaging on the postcontrast MR venogram.  There is suspected nonocclusive thrombus in the left transverse sinus on the postcontrast MPRAGE images of the brain as discussed above.  Repeat postcontrast MR venogram of the entire brain or dedicated CT venogram may be obtained for further evaluation.The remainder of the dural venous sinuses are patent without suspicious filling defect.  - Neuro following  - EEG Abnormal EEG study There is a risk for focal seizures with onset in the left anterior temporal region.Left hemispheric focal cerebral dysfunction, which is likely structural in etiology.There were no seizures recorded.    - S/P  Cerebral Angiography  Pre- Procedure Diagnosis: Left frontal parietal IPH Post- Procedure Diagnosis: negative cerebral angiogram, no vascular abnormality seen   - CT CAP -*  No evidence of metastatic disease or suspicious adenopathy in the chest, abdomen, or pelvis.*  Right pneumonectomy changes, similar to findings present on prior exams.  - Neurosurgery following  -Keppra 1500 BID and - Dex 4 q 6 hrs  - s/p L craniotomy 10/25, await path  - repeat MRI 10/26 - t1 hyperintense hemorrhage signal and nonspecidic enhancement at surgical site post op change vs neoplasm.  9mm hemorrhagic collection +MLS to right scattered cortical infarcts right hemisphere   - plan for repeat angio today for concern of AVM    -history of PE and irregular mural thrombus of descending AA.   - Completed 9 months of AC   - hypercoag work up was negative.  - was off anticoagulation at this time.  - dopplers negative for DVT    Right lung hi grade sarcoma   - follows  Dr. Sparks and Dr. Guadarrama at [facility]  -  s/p  R thoracotomy  R pneumonectomy  resection of 9.4 cm tumor   - S/P 4 cycles AIM chemotherapy LD 5/2023   - S/P adjuvant RT to the right lung LD 9/1/23   -  current scans are [facility]   	  last WBC 13. 75 likely due to steroids     Anticipating discharge to rehab     Will follow and update [facility]

## 2024-10-30 NOTE — PROGRESS NOTE ADULT - SUBJECTIVE AND OBJECTIVE BOX
[facility] Division of Hospital Medicine    Chief Complaint:  ICH     SUBJECTIVE / OVERNIGHT EVENTS:  Pt non verbal   Shakes head no when asked about pain or discomfort  Family at bedside, reports pt expresses needs with gestures     Unable to perform ROS     MEDICATIONS  (STANDING):  chlorhexidine 2% Cloths 1 Application(s) Topical <User Schedule>  dextrose 50% Injectable 12.5 Gram(s) IV Push once  dextrose 50% Injectable 25 Gram(s) IV Push once  dextrose 50% Injectable 25 Gram(s) IV Push once  enoxaparin Injectable 40 milliGRAM(s) SubCutaneous every 24 hours  insulin lispro (ADMELOG) corrective regimen sliding scale   SubCutaneous three times a day before meals  levETIRAcetam   Injectable 1500 milliGRAM(s) IV Push every 12 hours  pantoprazole    Tablet 40 milliGRAM(s) Oral daily  polyethylene glycol 3350 17 Gram(s) Oral daily  senna 2 Tablet(s) Oral at bedtime    MEDICATIONS  (PRN):  acetaminophen     Tablet .. 650 milliGRAM(s) Oral every 6 hours PRN Mild Pain (1 - 3)  benzocaine/menthol Lozenge 1 Lozenge Oral every 6 hours PRN Sore Throat  hydrALAZINE Injectable 10 milliGRAM(s) IV Push every 2 hours PRN SBP>160  labetalol Injectable 10 milliGRAM(s) IV Push every 2 hours PRN SBP>160  ondansetron Injectable 4 milliGRAM(s) IV Push every 6 hours PRN Nausea and/or Vomiting  oxyCODONE    IR 10 milliGRAM(s) Oral every 4 hours PRN Severe Pain (7 - 10)  oxyCODONE    IR 5 milliGRAM(s) Oral every 4 hours PRN Moderate Pain (4 - 6)        I&O's Summary      PHYSICAL EXAM:  Vital Signs Last 24 Hrs  T(C): 37.1 (30 Oct 2024 08:00), Max: 37.2 (30 Oct 2024 04:00)  T(F): 98.8 (30 Oct 2024 08:00), Max: 98.9 (30 Oct 2024 04:00)  HR: 70 (30 Oct 2024 08:00) (67 - 75)  BP: 116/72 (30 Oct 2024 08:00) (116/72 - 127/82)  BP(mean): --  RR: 18 (30 Oct 2024 08:00) (18 - 18)  SpO2: 97% (30 Oct 2024 08:00) (95% - 97%)    Parameters below as of 30 Oct 2024 08:00  Patient On (Oxygen Delivery Method): room air        CONSTITUTIONAL: Awake, alert and in no apparent distress   CARDIAC: Normal rate, regular rhythm.  Heart sounds S1, S2.   RESPIRATORY: Breath sounds clear and equal bilaterally.   ABDOMINAL: Nontender to palpation. No rebound/ guarding   EXTREMITIES: No edema, cyanosis or deformity   NEUROLOGICAL: Expressive aphasia, R sided weakness compared to L        LABS:                        11.6   13.75 )-----------( 303      ( 29 Oct 2024 04:57 )             34.3     10-29    139  |  103  |  16.8  ----------------------------<  124[H]  4.5   |  25.0  |  0.69    Ca    8.3[L]      29 Oct 2024 04:57  Phos  3.1     10-29  Mg     2.1     10-29            Urinalysis Basic - ( 29 Oct 2024 04:57 )    Color: x / Appearance: x / SG: x / pH: x  Gluc: 124 mg/dL / Ketone: x  / Bili: x / Urobili: x   Blood: x / Protein: x / Nitrite: x   Leuk Esterase: x / RBC: x / WBC x   Sq Epi: x / Non Sq Epi: x / Bacteria: x        CAPILLARY BLOOD GLUCOSE      POCT Blood Glucose.: 86 mg/dL (30 Oct 2024 08:50)  POCT Blood Glucose.: 165 mg/dL (29 Oct 2024 17:50)  POCT Blood Glucose.: 196 mg/dL (29 Oct 2024 12:17)

## 2024-10-30 NOTE — PROGRESS NOTE ADULT - ASSESSMENT
59yM w/ PMHx of lung cancer (2021) s/p R lobectomy transfer from [facility] p/w migrane and aphasia followed by R facial and R sided weakness starting 4:30 PM. 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 Now POD #1 from second diagnostic angio, which was negative.    PLAN:    -Q4h neuro checks  -STAT CTH for any changes in neuro exam  -Pain control prn, avoid oversedation  -Keppra 1500mg BID  -Decadron taper, currently on dex 4q6  -SBP   -Bowel regimen: senna, miralax; LBM: 10/26  -VTE prophylaxis: SCDs, okay to restart lovenox given stable CTH  -Activity: DC to [facility]  -Medicine following, reccs appreciated   -Discussed case w/ Dr. Melendrez

## 2024-10-30 NOTE — PROGRESS NOTE ADULT - ASSESSMENT
60 yo M with PMHX of lung cancer (diagnosed 2021) s/p R lobectomy ([facility] 2022) and HLD  p/w migraine and aphasia starting 4:30 pm 10/21/2024  followed by new onset R facial droop  and R sided weakness starting morning 10/22/2024. Wife states she gave one tablet of baby ASA around 4:30 pm the day prior for migraine. Patient denies daily use of AC/AP. Denies trauma. NIHSS 7 upon admission. Patient found to have L posterior frontal cortical ICH, transferred to [facility] for further care (10/25/2024). Patient on cardene in ED, transferred to NeuroICU for monitoring.   Patient underwent MRI w/wo which showed likely mass but unable to r/o underlying vascular abnormality. He underwent cerebral angiogram to assess vasculature on 10/24/24 prior to OR which was negative for any underlying vascular abnormality. Patient went to the OR on friday 10/25/24 for mass resection with concerns for possible AVM. Neuro IR was reconsulted by Neurosurgery who recommended repeating cerebral angiogram, planned for 10/28/24 with Dr. Pitts.     L frontal posterior ICH s/p resection for underlying mass   - c/w neuro checks  - Repeat CTH stable   - Keppra 1500 mg BID  - Decadron taper   - s/p repeat angio 10/28 with no vascular malformation or abnormality   - SBP goal< 160     Hx of lung cancer   - s/p R thoracotomy R pneumonectomy resection of 9.4 cm tumor   - Follows with [facility] [place] [facility]    history of PE and irregular mural thrombus of descending AA   - Completed 9 months of AC, has been off AC   - dopplers negative for DVT  - f/u Heme/Onc on DC     Hx of HLD  - high dose statin     DVT ppx - per primary team     DC planning per primary team.

## 2024-10-30 NOTE — PROGRESS NOTE ADULT - SUBJECTIVE AND OBJECTIVE BOX
CC: Patient being seen for rehabilitation follow up.  Patient feeling better.  Had a better night's sleep.  Reports no pain.  Continue to emphasize need for patient to phonate.   Recommended OOB daily.     FUNCTIONAL PROGRESS  10/29 PT  Sit-Stand Transfer Training  Sit-to-Stand Transfer Training Rehab Potential: good, to achieve stated therapy goals  Transfer Training Sit-to-Stand Transfer: minimum assist (75% patient effort);  1 person assist;  rolling walker  Transfer Training Stand-to-Sit Transfer: minimum assist (75% patient effort);  1 person assist;  rolling walker  Sit-to-Stand Transfer Training Transfer Safety Analysis: decreased weight-shifting ability;  decreased strength;  impaired balance    Gait Training  Gait Training Rehab Potential: good, to achieve stated therapy goals  Gait Training: minimum assist (75% patient effort);  1 person assist;  rolling walker;  45ft  Gait Analysis: decreased step length;  decreased stride length;  decreased weight-shifting ability;  impaired balance;  decreased strength  10/27 OT  Bathing Training:     · Level of [place]	moderate assist (50% patients effort)    Upper Body Dressing Training:     · Level of [place]	moderate assist (50% patients effort)    Lower Body Dressing Training:     · Level of [place]	moderate assist (50% patients effort)    Toilet Hygiene Training:     · Level of [place]	to assess    Grooming Training:     · Level of [place]	minimum assist (75% patients effort)    Eating/Self-Feeding Training:     · Level of [place]	Did not directly observe  · Physical Assist/Nonphysical Assist	pt family bedside reports needs assist with dominant R hand to grasp utensils, with noted spillage, pt given built up red tubing to increase independence, demonstrating ability to properly and successfully use tool for self feeding      VITALS  T(C): 37.1 (10-30-24 @ 08:00), Max: 37.2 (10-30-24 @ 04:00)  HR: 70 (10-30-24 @ 08:00) (67 - 80)  BP: 116/72 (10-30-24 @ 08:00) (112/73 - 127/82)  RR: 18 (10-30-24 @ 08:00) (16 - 18)  SpO2: 97% (10-30-24 @ 08:00) (95% - 97%)  Wt(kg): --    MEDICATIONS   acetaminophen     Tablet .. 650 milliGRAM(s) every 6 hours PRN  benzocaine/menthol Lozenge 1 Lozenge every 6 hours PRN  chlorhexidine 2% Cloths 1 Application(s) <User Schedule>  dextrose 50% Injectable 12.5 Gram(s) once  dextrose 50% Injectable 25 Gram(s) once  dextrose 50% Injectable 25 Gram(s) once  enoxaparin Injectable 40 milliGRAM(s) every 24 hours  hydrALAZINE Injectable 10 milliGRAM(s) every 2 hours PRN  insulin lispro (ADMELOG) corrective regimen sliding scale   three times a day before meals  labetalol Injectable 10 milliGRAM(s) every 2 hours PRN  levETIRAcetam   Injectable 1500 milliGRAM(s) every 12 hours  ondansetron Injectable 4 milliGRAM(s) every 6 hours PRN  oxyCODONE    IR 5 milliGRAM(s) every 4 hours PRN  oxyCODONE    IR 10 milliGRAM(s) every 4 hours PRN  pantoprazole    Tablet 40 milliGRAM(s) daily  polyethylene glycol 3350 17 Gram(s) daily  senna 2 Tablet(s) at bedtime      RECENT LABS/IMAGING  - Reviewed Today                        11.6   13.75 )-----------( 303      ( 29 Oct 2024 04:57 )             34.3     10-29    139  |  103  |  16.8  ----------------------------<  124[H]  4.5   |  25.0  |  0.69    Ca    8.3[L]      29 Oct 2024 04:57  Phos  3.1     10-29  Mg     2.1     10-29        Urinalysis Basic - ( 29 Oct 2024 04:57 )    Color: x / Appearance: x / SG: x / pH: x  Gluc: 124 mg/dL / Ketone: x  / Bili: x / Urobili: x   Blood: x / Protein: x / Nitrite: x   Leuk Esterase: x / RBC: x / WBC x   Sq Epi: x / Non Sq Epi: x / Bacteria: x                TTE 10/22  1. This is a suboptimal and limited study, May consider DANIEL for further evaluation.   2. Left ventricular cavity is normal in size. Left ventricular systolic function is severely decreased with an ejection fraction visually estimated at <20 %.   3. No pericardial effusion seen.   4. No prior echocardiogram is available for comparison.   5. There is no evidence of a left ventricular thrombus.      10-22  MRI brain MRI BRAIN: 1.  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  The findings are suspicious for an underlying hemorrhagic mass.  Superimposed cerebral infarction or hemorrhagic conversion of an infarct is not excluded.  Mild dural thickening and enhancement overlying the left cerebral convexity may be reactive or represent tumor invasion.2.  There is mild subarachnoid hemorrhage in the left frontal region and left sylvian fissure.3.  There is trace subdural hemorrhage overlying the left renal convexity, measuring 1 mm in caliber.4.  There is trace intraventricular hemorrhage dependently in the occipital horns of both lateral ventricles.  No hydrocephalus.5.  The dural venous sinuses and cavernous sinuses are patent on the postcontrast MPRAGE sequence; however there appears to be a thin linear nonocclusive filling defect in the lateral aspect the left transverse sinus extending to the junction with the left sigmoid sinus, which is suspicious for nonocclusive thrombus.    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  Evaluation of the hypoplastic left transverse sinus, left sigmoid sinus and left jugular bulb is limited by flow artifacts on the noncontrast MR venogram and incomplete imaging on the postcontrast MR venogram.  There is suspected nonocclusive thrombus in the left transverse sinus on the postcontrast MPRAGE images of the brain as discussed above.  Repeat postcontrast MR venogram of the entire brain or dedicated CT venogram may be obtained for further evaluation.The remainder of the dural venous sinuses are patent without suspicious filling defect.         10/22 CT CAP  No evidence of metastatic disease or suspicious adenopathy in the chest, abdomen, or pelvis.*  Right pneumonectomy changes, similar to findings present on prior exams.      CT-H 10/23  IMPRESSION: Small stable parenchymal hemorrhage with adjacent  subarachnoid hemorrhage.    New area of high attenuation is seen involving the inferior right  parietal cortex which could be compatible with a new area of parenchymal  hemorrhage.      TTE 10/23   1. Limited follow up study.   2. Technically difficult image quality.   3. Left ventricular cavity is small. Left ventricular systolic function is normal with an ejection fraction visually estimated at 60 to 65 %.   4. Normal right ventricular cavity size and probably normal right ventricular systolic function.   5. No pericardial effusion seen.   6. Compared to the transthoracic echocardiogram performed on 10/22/2024, study quality has improved and grossly with normal biventricular systolic function.    10/23 CT Head  No significant interval change from CT brain.    Similar-appearing left lateral frontal parenchymal hemorrhagic lesion  measures 3.2 x 2.6 x 2.5 cm (maximal AP x TV x CC dimensions).    Contiguous 0.9 x 2.3 x 1.7 cm hemorrhage along the superior aspect of the  primary hemorrhagic lesion.    Previously seen new region of high attenuation in the inferior right  parietal lobe is seen to represent artifact on the current examination.      10/23 EEG  Five focal seizures recorded from the left frontocentral region as described above, last seizure recorded at 7:28am on 10/23/24. Clinically, patient is having oral automatisms and frequent eye blinking, although video quality makes it difficult to interpret.  Left hemispheric focal cerebral dysfunction, which is likely structural in etiology.      10/24 CT Angiogram Head  Negative cerebral angiogram, no vascular abnormality seen      10/26 CT Head  New postoperative changes compatible with high left frontal parietal  craniotomy is identified. Previously noted area of parenchymal hemorrhage  appears to have been removed with only small amount of residual  hemorrhage and air identified in the postop region. Surrounding edema is  again identified. Mass effect on the left lateral ventricle is again seen  as well as left-to-right shift (4.4 mm)    Acute subdural hematoma with associated air in the postop region is  identified which measures approximately 1 cm in widest diameter.    Extra soft tissue swelling hematoma air and staples are seen in the  postop region.    Minimal mucosal thickening is seen involving the right ethmoid sinus    Both mastoid and middle ear regions are clear.  New postoperative changes as described above.      10/26 MR Brain w/wo  Status post craniotomy for resection of left frontal hemorrhagic lesion.  Postoperative MRI demonstrates both T1 hyperintense hemorrhage signal and  nonspecific enhancement at the intra-axial surgical site for which  follow-up is necessary to determine if this reflects postop change or  potential neoplasia. Please correlate with surgical pathology.  Surrounding edema is not significantly changed.    Postoperative extra-axial partly hemorrhagic collection 9 mm width  contributes to mild midline shift to right and this can be monitored on  serial post-op CT. No hydrocephalus or downward herniation.    New scattered cortical infarcts on DWI in the right cerebral hemisphere.  Correlate for a new left-sided deficit.    10/27 CT H  No significant interval change from CT brain 10/26/2024        ----------------------------------------------------------------------------------------  PHYSICAL EXAM  Constitutional - NAD, Comfortable  HEENT - Left cranial incision    Extremities - No edema   Neurologic Exam -                    Cognitive - Awake Alert     Communication - Limited phonation, Limited attempt to verbalize, Nods head     Cranial Nerves - Right lip droops     FUNCTIONAL MOTOR EXAM -                      LEFT    UE - ShAB 5/5, EF 5/5, EE 5/5, WE 5/5,  5/5                    RIGHT UE - EF 4/5, EE 4/5, WE 4/5,  3/5                    LEFT    LE - HF 5/5, KE 5/5, DF 5/5                     RIGHT LE - HF 5/5, KE 5/5, DF 2/5   Psychiatric - Calm, Smiles  ----------------------------------------------------------------------------------------  ASSESSMENT/PLAN  59yMale with functional deficits after developing a an ICH   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     When medically optimized, based on the patient's diagnosis, current functional status and potential for progress, recommend ACUTE inpatient rehabilitation for the functional deficits consisting of 3 hours of therapy/day & 24 hour RN/daily PMR physician for active comorbid medical management. Patient will be able to tolerate 3 hours a day.     Will continue to follow. Rehab recommendations are dependent on how functional progress changes as well as how patient continues to participate and tolerate therapeutic interventions, WHICH MAY CHANGE UPON FOLLOW UP EVALUATIONS. Recommend ongoing mobilization by staff to maintain cardiopulmonary function and prevention of secondary complications related to debility. Have discussed the specific rehabilitation management and recommendations above with rehab clinical care team/rehab liaison.      Total Time Spent on Encounter (reviewing clinical notes, labs, radiology, medications, patient history/exam, assessment and plan) - 50 minutes

## 2024-10-30 NOTE — PROGRESS NOTE ADULT - SUBJECTIVE AND OBJECTIVE BOX
58 yo M who follows  Dr. Sparks and Dr. Guadarrama [place] [facility] [place] a history of  hi grade sarcoma of the lung  s/p  R thoracotomy  R pneumonectomy  rescetion of 9.4 cm tumor S/P 4 cycles AIM chemotherapy LD 5/2023 and adjuvant RT to the right lung LD 9/1/23 current scans are [facility] and with Dr PETE Jean for a history of PE and irregular mural thrombus of descending AA. Completed 9 months of AC hypercoag work up was negative. Now off anticoagulation at this time. presents with aphasia starting 4:30 pm 10/21/2024  followed by new onset R facial droop  and R sided weakness starting this morning 10/22/2024. Wife states she gave one tablet of baby ASA around 4:30 pm yesterdat for migrane. Patient denies daily use of AC/AP. Denies trauma. NIHSS 7 upon admission. Patient found to have L posterior frontal cortical ICH, transfered to [facility] for further care. Patient on cardene in ED, transferred to Neuro ICU for monitoring      MEDICATIONS  (STANDING):  chlorhexidine 2% Cloths 1 Application(s) Topical <User Schedule>  dexAMETHasone  Injectable 4 milliGRAM(s) IV Push every 6 hours  dextrose 50% Injectable 12.5 Gram(s) IV Push once  dextrose 50% Injectable 25 Gram(s) IV Push once  dextrose 50% Injectable 25 Gram(s) IV Push once  insulin lispro (ADMELOG) corrective regimen sliding scale   SubCutaneous three times a day before meals  levETIRAcetam   Injectable 1500 milliGRAM(s) IV Push every 12 hours  pantoprazole    Tablet 40 milliGRAM(s) Oral daily  polyethylene glycol 3350 17 Gram(s) Oral daily  senna 2 Tablet(s) Oral at bedtime    MEDICATIONS  (PRN):  acetaminophen     Tablet .. 650 milliGRAM(s) Oral every 6 hours PRN Mild Pain (1 - 3)  benzocaine/menthol Lozenge 1 Lozenge Oral every 6 hours PRN Sore Throat  hydrALAZINE Injectable 10 milliGRAM(s) IV Push every 2 hours PRN SBP>140  labetalol Injectable 10 milliGRAM(s) IV Push every 2 hours PRN SBP>140  ondansetron Injectable 4 milliGRAM(s) IV Push every 6 hours PRN Nausea and/or Vomiting  oxyCODONE    IR 5 milliGRAM(s) Oral every 4 hours PRN Moderate Pain (4 - 6)  oxyCODONE    IR 10 milliGRAM(s) Oral every 4 hours PRN Severe Pain (7 - 10)    Vital Signs Last 24 Hrs  T(C): 37.1 (30 Oct 2024 08:00), Max: 37.2 (30 Oct 2024 04:00)  T(F): 98.8 (30 Oct 2024 08:00), Max: 98.9 (30 Oct 2024 04:00)  HR: 70 (30 Oct 2024 08:00) (67 - 80)  BP: 116/72 (30 Oct 2024 08:00) (112/73 - 127/82)  BP(mean): --  RR: 18 (30 Oct 2024 08:00) (16 - 18)  SpO2: 97% (30 Oct 2024 08:00) (95% - 97%)    Parameters below as of 30 Oct 2024 08:00  Patient On (Oxygen Delivery Method): room ai       PHYSICAL EXAM:  General: No Acute Distress   Neurological: Awake, alert & oriented following Commands, Severe expressive aphasia.    Cardiovascular:  +S1, +S2  Gastrointestinal: Soft  Nondistended      CBC                        11.6   13.75 )-----------( 303      ( 29 Oct 2024 04:57 )             34.3                             11.6   13.75 )-----------( 303      ( 29 Oct 2024 04:57 )             34.3                           11.7   15.45 )-----------( 276      ( 27 Oct 2024 05:15 )             35.0                           11.3   19.04 )-----------( 283      ( 26 Oct 2024 04:45 )             33.5                           11.7   21.39 )-----------( 284      ( 25 Oct 2024 03:00 )             35.1                           13.4   12.77 )-----------( 296      ( 24 Oct 2024 03:30 )             40.0       CHEM    10-29    139  |  103  |  16.8  ----------------------------<  124[H]  4.5   |  25.0  |  0.69    Ca    8.3[L]      29 Oct 2024 04:57  Phos  3.1     10-29  Mg     2.1     10-29        10-29    139  |  103  |  16.8  ----------------------------<  124[H]  4.5   |  25.0  |  0.69    Ca    8.3[L]      29 Oct 2024 04:57  Phos  3.1     10-29  Mg     2.1     10-29        10-25    138  |  103  |  12.4  ----------------------------<  118[H]  4.3   |  25.0  |  0.74    Ca    8.8      25 Oct 2024 03:00  Phos  3.4     10-25  Mg     1.9     10-25        10-24    138  |  102  |  12.2  ----------------------------<  141[H]  4.8   |  24.0  |  0.75    Ca    9.1      24 Oct 2024 03:30  Phos  2.5     10-24  Mg     2.4     10-24    TPro  7.5  /  Alb  3.9  /  TBili  0.7  /  DBili  x   /  AST  18  /  ALT  9   /  AlkPhos  120  10-22

## 2024-10-30 NOTE — PROGRESS NOTE ADULT - PROVIDER SPECIALTY LIST ADULT
Heme/Onc
Hospitalist
Neurosurgery
Physiatry
Heme/Onc
Heme/Onc
Neurosurgery
Neurosurgery
Physiatry
Hospitalist
NSICU
Neurosurgery
Heme/Onc
NSICU
Neurosurgery
NSICU
Neurosurgery
NSICU

## 2024-10-30 NOTE — PROGRESS NOTE ADULT - SUBJECTIVE AND OBJECTIVE BOX
HPI: 59yM w/ PMHx of lung cancer (2021) s/p R lobectomy [place] from [facility] p/w migrane and aphasia followed by R facial and R sided weakness starting 4:30 PM. 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    INTERVAL HX/OVERNIGHT EVENTS:  Patient seen and examined at bedside. Denies any acute complaints. Last BM: 10/26      Vital Signs Last 24 Hrs  T(C): 37.1 (30 Oct 2024 08:00), Max: 37.2 (30 Oct 2024 04:00)  T(F): 98.8 (30 Oct 2024 08:00), Max: 98.9 (30 Oct 2024 04:00)  HR: 70 (30 Oct 2024 08:00) (67 - 80)  BP: 116/72 (30 Oct 2024 08:00) (112/73 - 127/82)  BP(mean): --  RR: 18 (30 Oct 2024 08:00) (16 - 18)  SpO2: 97% (30 Oct 2024 08:00) (95% - 97%)    Parameters below as of 30 Oct 2024 08:00  Patient On (Oxygen Delivery Method): room air      PHYSICAL EXAM:  GENERAL: NAD    INCISION: Left incision site, staples in place, intact  HEAD: normocephalic   MENTAL STATUS: AAO x 2-3 (nods appropriately severe expressive aphasia, following simple commands    CRANIAL NERVES: PERRL, EOMI without nystagmus. R facial, Tongue deviated slight towards right. Hearing grossly intact. Head turning and shoulder shrug intact.  MOTOR: strength 5/5 LUE/BLE, RUE: R bi 4+/5, tri 4/5, R HG 4-/5    SENSATION: grossly intact to light touch all extremities   SKIN: Warm, dry    LABS:                                   11.6   13.75 )-----------( 303      ( 29 Oct 2024 04:57 )             34.3   10-29    139  |  103  |  16.8  ----------------------------<  124[H]  4.5   |  25.0  |  0.69    Ca    8.3[L]      29 Oct 2024 04:57  Phos  3.1     10-29  Mg     2.1     10-29      Urinalysis Basic - ( 29 Oct 2024 04:57 )    Color: x / Appearance: x / SG: x / pH: x  Gluc: 124 mg/dL / Ketone: x  / Bili: x / Urobili: x   Blood: x / Protein: x / Nitrite: x   Leuk Esterase: x / RBC: x / WBC x   Sq Epi: x / Non Sq Epi: x / Bacteria: x      10-28 @ 07:01  -  10-29 @ 07:00  --------------------------------------------------------  IN: 480 mL / OUT: 1050 mL / NET: -570 mL      RADIOLOGY & ADDITIONAL TESTS:

## 2024-11-11 ENCOUNTER — APPOINTMENT (OUTPATIENT)
Dept: NEUROLOGY | Facility: CLINIC | Age: 59
End: 2024-11-11
Payer: COMMERCIAL

## 2024-11-11 VITALS
BODY MASS INDEX: 22.73 KG/M2 | SYSTOLIC BLOOD PRESSURE: 98 MMHG | HEART RATE: 102 BPM | OXYGEN SATURATION: 98 % | DIASTOLIC BLOOD PRESSURE: 66 MMHG | HEIGHT: 68 IN | WEIGHT: 150 LBS

## 2024-11-11 DIAGNOSIS — I62.9 NONTRAUMATIC INTRACRANIAL HEMORRHAGE, UNSPECIFIED: ICD-10-CM

## 2024-11-11 PROCEDURE — G2211 COMPLEX E/M VISIT ADD ON: CPT | Mod: NC

## 2024-11-11 PROCEDURE — 99215 OFFICE O/P EST HI 40 MIN: CPT

## 2024-11-11 RX ORDER — ATORVASTATIN CALCIUM 40 MG/1
40 TABLET, FILM COATED ORAL
Refills: 0 | Status: ACTIVE | COMMUNITY

## 2024-11-11 RX ORDER — LEVETIRACETAM 750 MG/1
750 TABLET, FILM COATED ORAL
Refills: 0 | Status: ACTIVE | COMMUNITY

## 2024-11-15 LAB — SURGICAL PATHOLOGY STUDY: SIGNIFICANT CHANGE UP

## 2024-12-03 ENCOUNTER — APPOINTMENT (OUTPATIENT)
Dept: MRI IMAGING | Facility: CLINIC | Age: 59
End: 2024-12-03
Payer: COMMERCIAL

## 2024-12-03 PROCEDURE — 70553 MRI BRAIN STEM W/O & W/DYE: CPT

## 2024-12-03 PROCEDURE — A9585: CPT | Mod: JW

## 2024-12-13 ENCOUNTER — APPOINTMENT (OUTPATIENT)
Dept: NEUROLOGY | Facility: CLINIC | Age: 59
End: 2024-12-13
Payer: COMMERCIAL

## 2024-12-13 VITALS
RESPIRATION RATE: 15 BRPM | HEIGHT: 68 IN | HEART RATE: 71 BPM | OXYGEN SATURATION: 97 % | SYSTOLIC BLOOD PRESSURE: 120 MMHG | BODY MASS INDEX: 21.98 KG/M2 | WEIGHT: 145 LBS | DIASTOLIC BLOOD PRESSURE: 80 MMHG

## 2024-12-13 DIAGNOSIS — I62.9 NONTRAUMATIC INTRACRANIAL HEMORRHAGE, UNSPECIFIED: ICD-10-CM

## 2024-12-13 PROCEDURE — G2211 COMPLEX E/M VISIT ADD ON: CPT | Mod: NC

## 2024-12-13 PROCEDURE — 99214 OFFICE O/P EST MOD 30 MIN: CPT

## 2024-12-13 RX ORDER — DEXAMETHASONE 4 MG/1
4 TABLET ORAL
Refills: 0 | Status: ACTIVE | COMMUNITY

## 2025-03-19 ENCOUNTER — APPOINTMENT (OUTPATIENT)
Dept: NEUROLOGY | Facility: CLINIC | Age: 60
End: 2025-03-19

## 2025-04-30 ENCOUNTER — APPOINTMENT (OUTPATIENT)
Dept: NEUROLOGY | Facility: CLINIC | Age: 60
End: 2025-04-30

## 2025-06-10 NOTE — ED ADULT TRIAGE NOTE - TEMP AT ED ARRIVAL (C)
Physical Therapy Visit    Visit Type: Daily Treatment Note  Visit: 3  Referring Provider: Duane Santillan MD  Medical Diagnosis (from order): N39.3 - Stress incontinence, female     SUBJECTIVE                                                                                                               Adan reports has been doing the exercise daily and it is going well.      OBJECTIVE                                                                                                                    Observation   Demonstrates correct technique with breath control and pelvic floor muscle contraction                  Treatment     Neuromuscular Re-Education  Performed to re-educate motor control, coordination and proprioception in order to progress towards goal of control over reduce urinary leakage during activities  -Reviewed home program-to continue with random kegels throughout the day in various positions  Instructed and practiced using verbal and tactile cues for coordination and timing:  \"The Knack\"-quick pelvic floor muscle contractions in coordination with breath to control urinary leakage with quick movements such as cough/sneeze  Instructed in and practiced bladder retraining strategy using breathing and kegels with every urge prior to walking to toilet and on the way to toilet    Skilled input: verbal instruction/cues    Writer verbally educated and received verbal consent for hand placement, positioning of patient, and techniques to be performed today from patient for clothing adjustments for techniques, hand placement and palpation for techniques and therapist position for techniques as described above and how they are pertinent to the patient's plan of care.  Verbal consent received today for internal pelvic floor muscle assessment and treatment.   Patient provided continued consent during evaluation and treatment.  Patient was given alternative options.  Benefits and drawbacks were explained.  Home  Exercise Program  Access Code: [email or web address]  URL: [email or web address]  Date: 06/10/2025  Prepared by: Elsa Islas    Exercises  - Supine Pelvic Floor Contraction  - 1-2 x daily - 7 x weekly - 1 sets - 5-10 reps  - Seated Pelvic Floor Contraction  - 3-5 x daily - 7 x weekly - 1 sets - 5-10 reps  - Standing Pelvic Floor Contraction  - 3-5 x daily - 7 x weekly - 1 sets - 5-10 reps - 5 hold  - Functional Pelvic Floor Contractions with Cough Simulation  - 3-5 x daily - 7 x weekly - 1 sets - 5-10 reps - 5 hold  -Bladder retraining/urge suppression      ASSESSMENT                                                                                                            Focus of session was on functional pelvic floor muscle training and bladder retraining. She responded well to session and demonstrated correct technique with home program.  She continues to have urinary leakage with sneeze/cough and on the way to bathroom and will benefit from continued skilled physical therapy interventions to address these impairments to achieve established patient goals.    Education:   - Results of above outlined education: Verbalizes understanding and Demonstrates understanding    PLAN                                                                                                                           Suggestions for next session as indicated: Progress per plan of care         Therapy procedure time and total treatment time can be found documented on the Time Entry flowsheet     36.7

## (undated) DEVICE — CLIPPER BLADE NEURO (BLUE)

## (undated) DEVICE — STRYKER SONOPET IQ TUBING SET

## (undated) DEVICE — ELCTR SUBDERMAL CORKSCREW NDL 1.2MM

## (undated) DEVICE — DRAPE TOWEL BLUE 17" X 24"

## (undated) DEVICE — MIDAS REX MR8 TAPERED SM BORE 2.3MM X 7CM FOOTED

## (undated) DEVICE — POSITIONER FOAM EGG CRATE ULNAR 2PCS (PINK)

## (undated) DEVICE — CATH IV SAFE BC 18G X 1.16" (GREEN)

## (undated) DEVICE — ELCTR GROUNDING PAD ADULT COVIDIEN

## (undated) DEVICE — DRAPE XL SHEET 77X98"

## (undated) DEVICE — PREP DURAPREP 26CC

## (undated) DEVICE — PACK NEURO

## (undated) DEVICE — STAPLER SKIN PROXIMATE

## (undated) DEVICE — TUBING FOR SMOKE EVACUATOR (PURPLE END)

## (undated) DEVICE — Device

## (undated) DEVICE — ELCTR BIPOLAR PROBE

## (undated) DEVICE — ELCTR PEDICLE SCREW PROBE 3MM BALL 1.8MM X 100MM

## (undated) DEVICE — MIDAS REX MR8 TAPERED SM BORE 1.MM X 7CM

## (undated) DEVICE — DRSG TELFA 3 X 8

## (undated) DEVICE — DRAPE CRANIOTOMY W POUCH 100X104"

## (undated) DEVICE — STRYKER SONOPET IQ TIP 12CM STANDARD

## (undated) DEVICE — DRAPE MICROSCOPE ZEISS OPMI VISIONGUARD 154 X 52"

## (undated) DEVICE — VAGINAL PACKING 2"

## (undated) DEVICE — ELCTR MONOPOLAR STIMULATOR PROBE FLUSH-TIP

## (undated) DEVICE — DRSG 4 X 8

## (undated) DEVICE — ELCTR SUBDERMAL NDL CLASSIC 1.5M X 59" (6 COLOR)

## (undated) DEVICE — DRSG XEROFORM 1 X 8"

## (undated) DEVICE — MARKING PEN W RULER

## (undated) DEVICE — SYR LUER LOK 20CC

## (undated) DEVICE — AESCULAP SCALPFIX 10 CLIPS

## (undated) DEVICE — DRAPE INSTRUMENT POUCH 6.75" X 11"

## (undated) DEVICE — SPHERE MARKER FOR BRAIN LAB IMAGE (3 SPHERES)

## (undated) DEVICE — BIPOLAR FORCEP SYMMETRY BAYONET 7" X 1.5MM SMOOTH (SILVER)

## (undated) DEVICE — SUT VICRYL PLUS 2-0 18" CP-2 UNDYED (POP-OFF)

## (undated) DEVICE — SOL IRR POUR H2O 1000ML

## (undated) DEVICE — SOL IRR POUR NS 0.9% 1000ML

## (undated) DEVICE — ACRA-CUT CRANIAL PERFORATOR ADULT 14MM X 11MM (WHITE)

## (undated) DEVICE — WARMING BLANKET LOWER ADULT

## (undated) DEVICE — ELCTR BOVIE TIP BLADE INSULATED 4" EDGE

## (undated) DEVICE — SUT VICRYL PLUS 0 18" CT-1 UNDYED (POP-OFF)

## (undated) DEVICE — ELCTR SUBDERMAL NDL 27G X 1/2" WITH TWISTED PAIR

## (undated) DEVICE — ELCTR 4-DISC 20MM 49" (RED, BLUE, GREEN, BLACK)

## (undated) DEVICE — TUBING CONNECTING 6MM 20FT

## (undated) DEVICE — ELCTR ROCKER SWITCH PENCIL BLUE 10FT

## (undated) DEVICE — VENODYNE/SCD SLEEVE CALF MEDIUM

## (undated) DEVICE — SUT NUROLON 4-0 8-18" RB-1